# Patient Record
Sex: MALE | Race: BLACK OR AFRICAN AMERICAN | Employment: OTHER | URBAN - METROPOLITAN AREA
[De-identification: names, ages, dates, MRNs, and addresses within clinical notes are randomized per-mention and may not be internally consistent; named-entity substitution may affect disease eponyms.]

---

## 2017-01-04 ENCOUNTER — HOSPITAL ENCOUNTER (INPATIENT)
Age: 47
LOS: 6 days | Discharge: HOME | DRG: 885 | End: 2017-01-10
Attending: PSYCHIATRY & NEUROLOGY | Admitting: PSYCHIATRY & NEUROLOGY
Payer: MEDICARE

## 2017-01-04 PROBLEM — F25.9 SCHIZOAFFECTIVE DISORDER (HCC): Status: ACTIVE | Noted: 2017-01-04

## 2017-01-04 PROCEDURE — 74011250637 HC RX REV CODE- 250/637: Performed by: PSYCHIATRY & NEUROLOGY

## 2017-01-04 PROCEDURE — 65220000003 HC RM SEMIPRIVATE PSYCH

## 2017-01-04 RX ORDER — LORAZEPAM 2 MG/ML
1-2 INJECTION INTRAMUSCULAR
Status: DISCONTINUED | OUTPATIENT
Start: 2017-01-04 | End: 2017-01-10 | Stop reason: HOSPADM

## 2017-01-04 RX ORDER — HALOPERIDOL 5 MG/1
5 TABLET ORAL 2 TIMES DAILY
Status: DISCONTINUED | OUTPATIENT
Start: 2017-01-04 | End: 2017-01-07

## 2017-01-04 RX ORDER — ZOLPIDEM TARTRATE 5 MG/1
5 TABLET ORAL
Status: DISCONTINUED | OUTPATIENT
Start: 2017-01-04 | End: 2017-01-10 | Stop reason: HOSPADM

## 2017-01-04 RX ORDER — HALOPERIDOL 5 MG/ML
5 INJECTION INTRAMUSCULAR
Status: DISCONTINUED | OUTPATIENT
Start: 2017-01-04 | End: 2017-01-04

## 2017-01-04 RX ORDER — TRAZODONE HYDROCHLORIDE 100 MG/1
200 TABLET ORAL
Status: DISCONTINUED | OUTPATIENT
Start: 2017-01-04 | End: 2017-01-04

## 2017-01-04 RX ORDER — HYDROXYZINE PAMOATE 50 MG/1
50 CAPSULE ORAL
Status: DISCONTINUED | OUTPATIENT
Start: 2017-01-04 | End: 2017-01-10 | Stop reason: HOSPADM

## 2017-01-04 RX ORDER — OLANZAPINE 5 MG/1
5 TABLET, ORALLY DISINTEGRATING ORAL
Status: DISCONTINUED | OUTPATIENT
Start: 2017-01-04 | End: 2017-01-10 | Stop reason: HOSPADM

## 2017-01-04 RX ORDER — QUETIAPINE FUMARATE 300 MG/1
600 TABLET, FILM COATED ORAL
Status: DISCONTINUED | OUTPATIENT
Start: 2017-01-04 | End: 2017-01-10 | Stop reason: HOSPADM

## 2017-01-04 RX ORDER — HALOPERIDOL 5 MG/1
5 TABLET ORAL
Status: DISCONTINUED | OUTPATIENT
Start: 2017-01-04 | End: 2017-01-04

## 2017-01-04 RX ORDER — PANTOPRAZOLE SODIUM 40 MG/1
40 TABLET, DELAYED RELEASE ORAL DAILY
Status: DISCONTINUED | OUTPATIENT
Start: 2017-01-04 | End: 2017-01-05

## 2017-01-04 RX ORDER — HALOPERIDOL 10 MG/1
20 TABLET ORAL
Status: DISCONTINUED | OUTPATIENT
Start: 2017-01-04 | End: 2017-01-04

## 2017-01-04 RX ORDER — OXYCODONE AND ACETAMINOPHEN 5; 325 MG/1; MG/1
1 TABLET ORAL
Status: DISCONTINUED | OUTPATIENT
Start: 2017-01-04 | End: 2017-01-10 | Stop reason: HOSPADM

## 2017-01-04 RX ORDER — TOPIRAMATE 25 MG/1
50 TABLET ORAL
Status: DISCONTINUED | OUTPATIENT
Start: 2017-01-04 | End: 2017-01-10 | Stop reason: HOSPADM

## 2017-01-04 RX ORDER — SERTRALINE HYDROCHLORIDE 50 MG/1
200 TABLET, FILM COATED ORAL DAILY
Status: DISCONTINUED | OUTPATIENT
Start: 2017-01-04 | End: 2017-01-10 | Stop reason: HOSPADM

## 2017-01-04 RX ORDER — CALCIUM CARBONATE 200(500)MG
200 TABLET,CHEWABLE ORAL
Status: DISCONTINUED | OUTPATIENT
Start: 2017-01-04 | End: 2017-01-10 | Stop reason: HOSPADM

## 2017-01-04 RX ADMIN — SERTRALINE HYDROCHLORIDE 200 MG: 50 TABLET ORAL at 11:27

## 2017-01-04 RX ADMIN — PANTOPRAZOLE SODIUM 40 MG: 40 TABLET, DELAYED RELEASE ORAL at 10:00

## 2017-01-04 RX ADMIN — QUETIAPINE FUMARATE 600 MG: 300 TABLET, FILM COATED ORAL at 20:43

## 2017-01-04 RX ADMIN — TOPIRAMATE 50 MG: 25 TABLET, FILM COATED ORAL at 20:43

## 2017-01-04 RX ADMIN — CALCIUM CARBONATE (ANTACID) CHEW TAB 500 MG 200 MG: 500 CHEW TAB at 20:42

## 2017-01-04 RX ADMIN — HALOPERIDOL 5 MG: 5 TABLET ORAL at 20:43

## 2017-01-04 RX ADMIN — TOPIRAMATE 50 MG: 25 TABLET, FILM COATED ORAL at 11:27

## 2017-01-04 NOTE — H&P
[address] [facility] ADMIT NOTE-P    Name:  Linda Kitchen  MR#:  [id number]  :  1970  Account #:  [de-identified]  Date of Adm:  2017      IDENTIFYING INFORMATION: The patient is a 44-year-old   American male admitted from the [facility] Department. REASON FOR ADMISSION: The patient was admitted voluntarily for  worsening depression and psychosis. HISTORY OF PRESENT ILLNESS: The patient was seen during  rounds and he stated, \"because I hear voices and see things\" as the  reason for admission. Upon further questioning, the patient endorses  auditory and visual hallucinations \"for a long time. \" He also endorses a  depressed mood that has worsened for the last 2 weeks. The patient  reports having a cholecystectomy performed on 2016. He states  that approximately 1 week before the surgery, he had to stop his  psychotropic medications. He reports having restarted them after  surgery. However, he experienced a recurrence of his symptoms and  they have not improved since restarting his medications. The patient  endorses poor focus, sleep disturbance described as difficulty initiating  sleep, low appetite, poor energy and decreased functioning overall. The patient also endorsed suicidal ideation with thoughts to jump in  front of a car. He reports 7 previous suicide attempts. The patient also  reported relapse on crack cocaine that occurred yesterday after 4  months of sobriety. The patient reported using this substance since  approximately age 23 or 27. Since that time, his longest period of  sobriety has been 6 years. PSYCHIATRIC HISTORY: The patient has been hospitalized on 4  previous occasions. He states he was last hospitalized in November o  2016. He goes to [facility] in [place] for outpatient treatment. He reports previously taking  1. Zoloft 200 mg daily.   2. Seroquel 600 mg at bedtime. 3. Haldol 20 mg twice daily. 4. Trazodone 200 mg at bedtime. after his last hospital discharge. MEDICAL HISTORY: Significant for gastroesophageal reflux disease,  asthma, and migraine headaches. ALLERGIES: NO KNOWN DRUG ALLERGIES. FAMILY HISTORY: Significant for schizophrenia in the patient's  mother. SUBSTANCE ABUSE HISTORY: Per HPI. LEGAL HISTORY: The patient denies current pending legal charges. SOCIAL HISTORY: The patient currently lives in [place]. He is   from his wife. The patient receives disability benefits for  schizoaffective disorder. PHYSICAL EXAMINATION  Please refer to the physical exam performed by the nurse practitioner. VITAL SIGNS: The patient's most recent vitals are as follows:  Temperature 98.6, heart rate 98, blood pressure 122/83, respiratory  rate 18. LABORATORY VALUES: Significant labs are as follows: Urine drug  screen was positive for marijuana and cocaine. Low potassium of 2.8. MENTAL STATUS EXAMINATION: The patient is a 59-year-old male. He appears somewhat older than his stated age. The patient is  cooperative and able to participate in the interview. His eye contact  was fair. The patient's speech was normal in rate, volume and tone. There are no abnormal movements appreciated on exam. The patient's  stated mood is \"I'm okay. \" The patient's affect was constricted. The  patient's thoughts were logical and goal directed. The patient denied  suicidal and homicidal ideation at the time of the interview. The patient  did endorse auditory hallucinations at the time of the interview. He did  not appear to respond to internal stimuli. The patient stated that he  was able to tolerate his auditory hallucinations and focus on interview. His insight and judgment are impaired. ADMISSION DIAGNOSES  1. Schizoaffective disorder, depressed type. 2. Cocaine use disorder. 3. Rule out cannabis use disorder.     INITIAL TREATMENT PLAN: The patient will remain on the inpatient  unit. He will be continued on medications for his physical health  including Topamax 50 mg by mouth 3 times daily and Protonix 40 mg  daily will be supplemented for Nexium for the patient's GERD. The  patient will be continued on Seroquel 600 mg by mouth at bedtime. Haldol will be decreased to 5 mg by mouth twice daily for now. He is to  engage in all aspects of treatment in the therapeutic milieu. ESTIMATED LENGTH OF STAY: 4 to 5 days.         MD VANE Garrido / AMANDA  D:  01/04/2017   15:34  T:  01/04/2017   16:08  Job #:  [id number]

## 2017-01-04 NOTE — IP AVS SNAPSHOT
Matthew Cai 
 
 
 [address] [facility] [address] Patient: Caitlyn Zhong MRN: [id number] You are allergic to the following No active allergies Recent Documentation Height Weight BMI Smoking Status 1.829 m 103 kg 30.79 kg/m2 Current Every Day Smoker Emergency Contacts Name Discharge Info Relation Home Work Mobile U[employer] 18 CAREGIVER [3] Father [15] [phone] About your hospitalization You were admitted on:  2017 You last received care in the:  [facility] ADULT CHEM DEP You were discharged on:  January 10, 2017 Unit phone number:  [phone] Why you were hospitalized Your primary diagnosis was:  Not on File Your diagnoses also included:  Schizoaffective Disorder (Hcc) Providers Seen During Your Hospitalizations Provider Role Specialty Primary office phone Juan David York MD Attending Provider Psychiatry [phone] Your Primary Care Physician (PCP) Primary Care Physician Office Phone Office Fax Judy Maldonado [phone] Follow-up Information Follow up With Details Comments Contact Info [facility] On 2017 Patient has an appointment with Carmela Freedman at 6:00 pm.  [address] [place] [address] [address] [id number] [phone] Current Discharge Medication List  
  
CONTINUE these medications which have CHANGED Dose & Instructions Dispensing Information Comments Morning Noon Evening Bedtime  
 haloperidol 5 mg tablet Commonly known as:  HALDOL What changed:   
- how much to take - when to take this Your next dose is: Today, Tomorrow Other:  _________ Dose:  15 mg Take 3 Tabs by mouth nightly. Indications: PSYCHOTIC DISORDER Quantity:  90 Tab Refills:  0 CONTINUE these medications which have NOT CHANGED Dose & Instructions Dispensing Information Comments Morning Noon Evening Bedtime  
 b complex vitamins tablet Your next dose is: Today, Tomorrow Other:  _________ Dose:  1 Tab Take 1 Tab by mouth daily. Refills:  0  
     
   
   
   
  
 esomeprazole 40 mg capsule Commonly known as:  Lobito Feil Your next dose is: Today, Tomorrow Other:  _________ Dose:  40 mg Take 40 mg by mouth two (2) times a day. Refills:  0  
     
   
   
   
  
 linaclotide 290 mcg Cap capsule Commonly known as:  Samuel Fiordaliza Your next dose is: Today, Tomorrow Other:  _________ Dose:  290 mcg Take 290 mcg by mouth daily. Refills:  0  
     
   
   
   
  
 multivitamin tablet Commonly known as:  ONE A DAY Your next dose is: Today, Tomorrow Other:  _________ Dose:  1 Tab Take 1 Tab by mouth daily. Refills:  0  
     
   
   
   
  
 naproxen 500 mg tablet Commonly known as:  NAPROSYN Your next dose is: Today, Tomorrow Other:  _________ Dose:  500 mg Take 500 mg by mouth two (2) times daily (with meals). Indications: for 10 days Refills:  0 QUEtiapine 300 mg tablet Commonly known as:  SEROquel Your next dose is: Today, Tomorrow Other:  _________ Dose:  600 mg Take 2 Tabs by mouth nightly. Indications: Psychosis Quantity:  60 Tab Refills:  0  
     
   
   
   
  
 sertraline 100 mg tablet Commonly known as:  ZOLOFT Your next dose is: Today, Tomorrow Other:  _________ Dose:  200 mg Take 2 Tabs by mouth daily. Indications: Depression Quantity:  60 Tab Refills:  0  
     
   
   
   
  
 topiramate 50 mg tablet Commonly known as:  TOPAMAX Your next dose is: Today, Tomorrow Other:  _________  Dose:  50 mg  
 Take 50 mg by mouth three (3) times daily. Refills:  0 STOP taking these medications   
 oxyCODONE-acetaminophen 5-325 mg per tablet Commonly known as:  PERCOCET  
   
  
 penicillin v potassium 500 mg tablet Commonly known as:  VEETID  
   
  
 traZODone 100 mg tablet Commonly known as:  Aaron Fray Where to Get Your Medications Information on where to get these meds will be given to you by the nurse or doctor. ! Ask your nurse or doctor about these medications  
  haloperidol 5 mg tablet QUEtiapine 300 mg tablet  
 sertraline 100 mg tablet Discharge Instructions DISCHARGE SUMMARY from Nurse Discharge medications reviewed with the patient and appropriate educational materials and side effects teaching were provided. The personal items collected during your admission are returned to you: The discharge information has been reviewed with the patient. The patient verbalized understanding. Patient armband removed and shredded A copy of discharged instructions given to patient with medications to be filled in pharmacy of choice IMPORTANT NUMBERS TO REMEMBER Crisis Hotline: 4-920.321.4091. [facility]: [phone]. [facility] [facility] desk: [phone] Discharge Orders None Introducing [facility] SERVICES! Jesi Crawford introduces [employer] (now [employer] patient portal. Now you can access parts of your medical record, email your doctor's office, and request medication refills online. 1. In your internet browser, go to [email or web address] 2. Click on the First Time User? Click Here link in the Sign In box. You will see the New Member Sign Up page. 3. Enter your [employer] Access Code exactly as it appears below. You will not need to use this code after youve completed the sign-up process. If you do not sign up before the expiration date, you must request a new code. · [employer] Access Code: [email or web address] Expires: 3/23/2017  1:24 PM 
 
4. Enter the last four digits of your Social Security Number (xxxx) and Date of Birth (mm/dd/yyyy) as indicated and click Submit. You will be taken to the next sign-up page. 5. Create a [employer] ID. This will be your [employer] login ID and cannot be changed, so think of one that is secure and easy to remember. 6. Create a [employer] password. You can change your password at any time. 7. Enter your Password Reset Question and Answer. This can be used at a later time if you forget your password. 8. Enter your e-mail address. You will receive e-mail notification when new information is available in [address] 9. Click Sign Up. You can now view and download portions of your medical record. 10. Click the Download Summary menu link to download a portable copy of your medical information. If you have questions, please visit the Frequently Asked Questions section of the [employer] website. Remember, [employer] is NOT to be used for urgent needs. For medical emergencies, dial 911. Now available from your iPhone and Android! General Information Please provide this summary of care documentation to your next provider. Patient Signature:  ____________________________________________________________ Date:  ____________________________________________________________  
  
Vanda Meehan Provider Signature:  ____________________________________________________________ Date:  ____________________________________________________________

## 2017-01-04 NOTE — IP AVS SNAPSHOT
Current Discharge Medication List  
  
Take these medications at their scheduled times Dose & Instructions Dispensing Information Comments Morning Noon Evening Bedtime  
 b complex vitamins tablet Your next dose is: Today, Tomorrow Other:  ____________ Dose:  1 Tab Take 1 Tab by mouth daily. Refills:  0  
     
   
   
   
  
 esomeprazole 40 mg capsule Commonly known as:  Leslie Angel Your next dose is: Today, Tomorrow Other:  ____________ Dose:  40 mg Take 40 mg by mouth two (2) times a day. Refills:  0  
     
   
   
   
  
 haloperidol 5 mg tablet Commonly known as:  HALDOL Your next dose is: Today, Tomorrow Other:  ____________ Dose:  15 mg Take 3 Tabs by mouth nightly. Indications: PSYCHOTIC DISORDER Quantity:  90 Tab Refills:  0  
     
   
   
   
  
 linaclotide 290 mcg Cap capsule Commonly known as:  Malcolm Flock Your next dose is: Today, Tomorrow Other:  ____________ Dose:  290 mcg Take 290 mcg by mouth daily. Refills:  0  
     
   
   
   
  
 multivitamin tablet Commonly known as:  ONE A DAY Your next dose is: Today, Tomorrow Other:  ____________ Dose:  1 Tab Take 1 Tab by mouth daily. Refills:  0  
     
   
   
   
  
 naproxen 500 mg tablet Commonly known as:  NAPROSYN Your next dose is: Today, Tomorrow Other:  ____________ Dose:  500 mg Take 500 mg by mouth two (2) times daily (with meals). Indications: for 10 days Refills:  0 QUEtiapine 300 mg tablet Commonly known as:  SEROquel Your next dose is: Today, Tomorrow Other:  ____________ Dose:  600 mg Take 2 Tabs by mouth nightly. Indications: Psychosis Quantity:  60 Tab Refills:  0  
     
   
   
   
  
 sertraline 100 mg tablet Commonly known as:  ZOLOFT Your next dose is: Today, Tomorrow Other:  ____________ Dose:  200 mg Take 2 Tabs by mouth daily. Indications: Depression Quantity:  60 Tab Refills:  0  
     
   
   
   
  
 topiramate 50 mg tablet Commonly known as:  TOPAMAX Your next dose is: Today, Tomorrow Other:  ____________ Dose:  50 mg Take 50 mg by mouth three (3) times daily. Refills:  0 Where to Get Your Medications Information about where to get these medications is not yet available ! Ask your nurse or doctor about these medications  
  haloperidol 5 mg tablet QUEtiapine 300 mg tablet  
 sertraline 100 mg tablet

## 2017-01-04 NOTE — BH NOTES
Discussed case with the treating psychiatrist and unit charge nurse. EVERARDO Contact: Pt. Is a 42year old female with history of  Bipolar Disorder and polysubstance abuse (cocaine, marijuana and alcohol). EVERARDO met with pt. To discuss d/c planning. The pt. Stated he has been feeling depressed. Pt. admist to recently relapsing on cocaine and alcohol. Pt. Stated he had been clean for 4 months. Pt. admists that he smokes marijuana daily. The pt. Stated he has been hearing voices telling him to harm self. SW dicussed positive cooing skills, relapse prevention, safety plan and aftercare. Pt. Stated he will be living with family upon d/c.   Pt. Stated he will follow-up aftercare with [facility].

## 2017-01-04 NOTE — BSMART NOTE
ACTIVITIES THERAPY PROGRESS NOTE    Group time:1530  . The patient did not awaken/get up when called for group.

## 2017-01-04 NOTE — BH NOTES
Pt arrived ambulatory on unit escorted by patient representative. Pt came into unit and sat down and then laid his head down on the table. Pt was cooperative and polite during 1:1. Pt reported having SI without a plan. Pt reported having AVH and being very depressed about his health and strained relationship with his father. Pt reports relapsing on crack on 1/3/2017, using $400 of crack. Pt reports having asthma and a hx of migraine headaches. Pt gave a good medical, medication, and psychiatric history. Pt reported being discharged from [facility] in Dec. 2016. Pt reports a previous history of SI with two previous attempts one by taking pills and the other by drinking bleach. Pt reported being in Substance Abuse program  15 plus years ago. Pt has a recent gallbladder surgical history. Pt was compliant during 1:1 and upon finishing went to room to lay down.

## 2017-01-05 LAB
ATRIAL RATE: 88 BPM
CALCULATED P AXIS, ECG09: 61 DEGREES
CALCULATED R AXIS, ECG10: -37 DEGREES
CALCULATED T AXIS, ECG11: 48 DEGREES
DIAGNOSIS, 93000: NORMAL
P-R INTERVAL, ECG05: 162 MS
Q-T INTERVAL, ECG07: 386 MS
QRS DURATION, ECG06: 88 MS
QTC CALCULATION (BEZET), ECG08: 467 MS
VENTRICULAR RATE, ECG03: 88 BPM

## 2017-01-05 PROCEDURE — 74011250637 HC RX REV CODE- 250/637: Performed by: PSYCHIATRY & NEUROLOGY

## 2017-01-05 PROCEDURE — 93005 ELECTROCARDIOGRAM TRACING: CPT

## 2017-01-05 PROCEDURE — 65220000003 HC RM SEMIPRIVATE PSYCH

## 2017-01-05 RX ORDER — PANTOPRAZOLE SODIUM 40 MG/1
40 TABLET, DELAYED RELEASE ORAL 2 TIMES DAILY
Status: DISCONTINUED | OUTPATIENT
Start: 2017-01-05 | End: 2017-01-07 | Stop reason: SDUPTHER

## 2017-01-05 RX ADMIN — OXYCODONE HYDROCHLORIDE AND ACETAMINOPHEN 1 TABLET: 5; 325 TABLET ORAL at 10:54

## 2017-01-05 RX ADMIN — OXYCODONE HYDROCHLORIDE AND ACETAMINOPHEN 1 TABLET: 5; 325 TABLET ORAL at 20:41

## 2017-01-05 RX ADMIN — CALCIUM CARBONATE (ANTACID) CHEW TAB 500 MG 200 MG: 500 CHEW TAB at 10:54

## 2017-01-05 RX ADMIN — HALOPERIDOL 5 MG: 5 TABLET ORAL at 20:42

## 2017-01-05 RX ADMIN — TOPIRAMATE 50 MG: 25 TABLET, FILM COATED ORAL at 12:19

## 2017-01-05 RX ADMIN — PANTOPRAZOLE SODIUM 40 MG: 40 TABLET, DELAYED RELEASE ORAL at 20:41

## 2017-01-05 RX ADMIN — MULTIPLE VITAMINS W/ MINERALS TAB 1 TABLET: TAB at 08:14

## 2017-01-05 RX ADMIN — QUETIAPINE FUMARATE 600 MG: 300 TABLET, FILM COATED ORAL at 20:41

## 2017-01-05 RX ADMIN — PANTOPRAZOLE SODIUM 40 MG: 40 TABLET, DELAYED RELEASE ORAL at 08:14

## 2017-01-05 RX ADMIN — HALOPERIDOL 5 MG: 5 TABLET ORAL at 08:14

## 2017-01-05 RX ADMIN — TOPIRAMATE 50 MG: 25 TABLET, FILM COATED ORAL at 08:14

## 2017-01-05 RX ADMIN — TOPIRAMATE 50 MG: 25 TABLET, FILM COATED ORAL at 16:41

## 2017-01-05 RX ADMIN — SERTRALINE HYDROCHLORIDE 200 MG: 50 TABLET ORAL at 08:14

## 2017-01-05 NOTE — BH NOTES
Gogo Sagrario did not attend 30 minute nursing group on health and wellness. Pt was informed of group but remained in bed.

## 2017-01-05 NOTE — BH NOTES
Pt did attend and participate in group held this morning and was seen by doctor. He was seen by doctor and . Good appetite and spent some time on phone. He voiced no major complaints. Napped at times throughout shift. Wearing   Non skid socks. Encouraged to be more active in his treatment while here.

## 2017-01-05 NOTE — BH NOTES
MHT Note: The Pt was in his bed majority of the shift. The Pt complained about being tired and sleepy. He did eat dinner and bathed. The Pt stated he was just tired and wanted to get some rest right now. The Pt did contract for safety and did take his medication. The Pt did not have any visitors or phone calls during the shift. The Pt was reminded to keep socks and/or shoes on his feet to avoid slips and/or falls.

## 2017-01-05 NOTE — BH NOTES
Lolly Blair is participating in [employer]. Group time: 15 minutes    Personal goal for participation: Community Concerns    Goal orientation: community    Group therapy participation: active    Therapeutic interventions reviewed and discussed: Staff encouraged Pt. To report any repairs or Community Concerns    Impression of participation: Pt.  Had no repairs to report/community concerns at this time

## 2017-01-05 NOTE — BSMART NOTE
ART THERAPY GROUP PROGRESS NOTE    Group time:1430    The patient did not awaken/get up when called for group.

## 2017-01-05 NOTE — BH NOTES
Blakela Danny is not participating in Music Therapy. Group time: 15 minutes    Personal goal for participation:  Relax while listening to Aromatherapy music    Goal orientation: relaxation    Group therapy participation: refuse    Therapeutic interventions reviewed and discussed: Staff encouraged Pt. To participate in listening to soft music    Impression of participation:  Pt. Refuse chose to rest in bed despite staff encouragement.

## 2017-01-05 NOTE — PROGRESS NOTES
Problem: Suicide/Homicide (Adult/Pediatric)  Goal: *STG: Remains safe in hospital  Pt will not engage in any self injurious behaviors during hospitalization   Outcome: Progressing Towards Goal  Patient has remained free from harm in the unit, will continue to monitor patient for safety. Problem: Falls - Risk of  Goal: *Absence of falls  Pt will remain free of falls during hospitalization   Outcome: Progressing Towards Goal  Patient has not had any fall during the shift, will continue to monitor patient for safety. Comments:   Patient has been calm, pleasant and compliant with his medication therapy during the shift. Patient is participating in groups/therapies. Patient stated he still depressed and still hears voices, but says voices are not distinct. Patient denies HI but voiced SI. Patient contracted for safety and stated he will let the nurse know if he feels like he wants to hurt himself. Will continue to monitor patient for safety.

## 2017-01-05 NOTE — BH NOTES
The pt was discussed with nursing staff and seen during rounds. He c/o pain secondary to GERD and from his recent cholecystectomy. He reports tolerating meds without difficulty. He continues to experience AH and depressed mood. He did state he was able to participate in groups. The pt c/o poor sleep last pm.  He was encouraged to request pain medicine for his recent surgery. He was also encouraged to request Ambien for sleep. MSE-  39yo AAM.  Appears slightly older than stated age. Cooperative. Appears uncomfortable. Eye contact is fair. Speech is normal in rate, volume, and tone. Stated mood is, \"I'm okay. I'm just in pain. \"  Thoughts are goal-directed. Denies SI and HI. Endorses AH. Insight and judgment are mildly impaired. EKG reviewed. QT prolongation present but interval decreased from 11/2016 EKG. A/P-Schizoaffective disorder, depressed type; Cocaine use disorder; Rule out cannabis use disorder  1. Will increase Protonix to 40mg bid to target GERD. Pt is to contact his father to request he bring Nexium. 2. Continue Seroquel 600mg qhs.    3. Continue Haldol 5mg bid for now. 4. Continue Zoloft 200mg daily. 5. Continue hospitalization.

## 2017-01-05 NOTE — BH NOTES
GROUP THERAPY PROGRESS NOTE    Mathieu Muro is participating in [employer]. Group time: 30 minutes    Personal goal for participation: Pt sat quietly in group stating that he just wanted to get some sleeping. Wanted to ask  Doctor when he would be going home.

## 2017-01-05 NOTE — BH NOTES
Discussed case with the treating psychiatrist and unit charge nurse.     SW Contact: Pt. Is a 42year old female with history of Bipolar Disorder and polysubstance abuse (cocaine, marijuana and alcohol). SW met with pt. To discuss d/c planning. Pt. Stated he was feeling a little better. SW dicussed positive coping skills, relapse prevention and safety plan. Pt. Stated he is interested in a SA residential program.  SW assist the pt. with exploring residential SA program. Pt. affect is improving. Pt. continues have auditory hallucinations.

## 2017-01-05 NOTE — BSMART NOTE
OCCUPATIONAL THERAPY PROGRESS NOTE  Group Time:  2440  Attendance: The patient attended full group. Participation:  The patient participated with minimal elaboration in the activity. Attention:  The patient was able to focus on the activity. Interaction:  The patient acknowledges others or responds to questions,  with no spontaneous interaction. Discussed his relapse and feeling shame after 14 years of sobriety. Discussed possible triggers. Patient somewhat concrete.

## 2017-01-05 NOTE — BH NOTES
Cecilia Alfred is participating in Music Therapy. Group time: 15 minutes    Personal goal for participation:  Relax while listening to Aromatherapy music    Goal orientation: relaxation    Group therapy participation: active    Therapeutic interventions reviewed and discussed: Staff encouraged Pt. To participate in listening to soft music    Impression of participation:  Pt.  Was calm  And relax while listening to music

## 2017-01-05 NOTE — H&P
History and Physical        Patient: Otis Zhao               Sex: male          DOA: 1/4/2017         YOB: 1970      Age:  42 y.o.        LOS:  LOS: 1 day        HPI:     Otis Zhao is a 42 y.o. male who was admitted experiencing suicidal ideation, depression, auditory hallucinations and cocaine dependence. Active Problems:    Schizoaffective disorder ([address] (1/4/2017)        Past Medical History   Diagnosis Date    Adenomatous colon polyp     Anxiety     Arthritis     Asthma     Back pain     CPAP (continuous positive airway pressure) dependence     Depression     Dizziness     Gallbladder disease     GERD (gastroesophageal reflux disease)     Hemorrhoids     Joint pain     Migraine     Migraines     Neck pain     Pancreatitis     PUD (peptic ulcer disease)     Rectal bleeding     Sleep apnea      uses c pap       Past Surgical History   Procedure Laterality Date    Flexible sigmoidoscopy N/A 9/27/2016     SIGMOIDOSCOPY FLEXIBLE performed by Adela Coleman MD at [address] [place] [address]  10/25/2016          Hx gi       Gall Bladder       Family History   Problem Relation Age of Onset    Cancer Mother     Hypertension Father        Social History     Social History    Marital status: SINGLE     Spouse name: N/A    Number of children: 3    Years of education: High School graduate     Social History Main Topics    Smoking status: Current Every Day Smoker     Packs/day: 0.50     Years: 15.00    Smokeless tobacco: Not on file    Alcohol use 0.6 oz/week     1 Cans of beer per week    Drug use: Yes     Special: Cocaine      Comment: Crack    Sexual activity: Yes     Partners: Female     Birth control/ protection: None     Other Topics Concern    Not on file     Social History Narrative   Patient states he lives with his father. Reports appetite and sleep have been poor. He receives dis ability.     Prior to Admission medications    Medication Sig Start Date End Date Taking? Authorizing Provider   penicillin v potassium (VEETID) 500 mg tablet Take 1 Tab by mouth four (4) times daily for 7 days. 12/30/16 1/6/17 Yes ROGE Zee   naproxen (NAPROSYN) 500 mg tablet Take 500 mg by mouth two (2) times daily (with meals). Indications: for 10 days   Yes Historical Provider   oxyCODONE-acetaminophen (PERCOCET) 5-325 mg per tablet Take 1 Tab by mouth every four (4) hours as needed for Pain. Max Daily Amount: 6 Tabs. 12/30/16  Yes Jaycee Marrufo MD   sertraline (ZOLOFT) 100 mg tablet Take 200 mg by mouth daily. Yes Historical Provider   QUEtiapine (SEROQUEL) 300 mg tablet Take 600 mg by mouth nightly. Yes Historical Provider   esomeprazole (NEXIUM) 40 mg capsule Take 40 mg by mouth two (2) times a day. Yes Historical Provider   haloperidol (HALDOL) 5 mg tablet Take 20 mg by mouth two (2) times a day. Yes Historical Provider   multivitamin (ONE A DAY) tablet Take 1 Tab by mouth daily. Yes Historical Provider   linaclotide (LINZESS) 290 mcg cap capsule Take 290 mcg by mouth daily. Yes Historical Provider   topiramate (TOPAMAX) 50 mg tablet Take 50 mg by mouth three (3) times daily. Yes Historical Provider   traZODone (DESYREL) 100 mg tablet Take 200 mg by mouth nightly. Yes Historical Provider   b complex vitamins tablet Take 1 Tab by mouth daily. Historical Provider       No Known Allergies    Review of Systems  A comprehensive review of systems was negative. Physical Exam:      Visit Vitals    /83    Pulse 98    Temp 98.6 °F (37 °C)    Resp 18    Ht 6' (1.829 m)    Wt 227 lb (103 kg)    BMI 30.79 kg/m2       Physical Exam:    General:  Alert, cooperative, well developed, well nourished, unkempt, AA male, no distress, appears stated age. Eyes:  Conjunctivae/corneas clear. PERRL, EOMs intact. Fundi benign   Ears:  Normal TMs and external ear canals both ears. Nose: Nares normal. Septum midline.  Mucosa normal. No drainage or sinus tenderness. Mouth/Throat: Lips burned, mucosa, and tongue normal. Teeth in disrepair and gums normal.   Neck: Supple, symmetrical, trachea midline, no adenopathy, thyroid: no enlargement/tenderness/nodules, no carotid bruit and no JVD. Back:   Symmetric, no curvature. ROM normal. No CVA tenderness. Lungs:   Clear to auscultation bilaterally. Heart:  Regular rate and rhythm, S1, S2 normal, no murmur, click, rub or gallop. Abdomen:   Soft, tender to palpation, recent surgery. Five covered post surgery areas. Bowel sounds normal. No masses,  No organomegaly. Extremities: Extremities normal, atraumatic, no cyanosis or edema. Pulses: 2+ and symmetric all extremities. Skin: Skin color, texture, turgor normal.    Lymph nodes: Cervical, supraclavicular, and axillary nodes normal.   Neurologic: CNII-XII intact. Normal strength, sensation and reflexes throughout.            Assessment/Plan     Cocaine dependence  Auditory hallucinations  Suicidal ideation  Labs reviewed (+Cocaine, THC)  Continue treatment per physician's orders

## 2017-01-06 PROCEDURE — 65220000003 HC RM SEMIPRIVATE PSYCH

## 2017-01-06 PROCEDURE — 74011250637 HC RX REV CODE- 250/637: Performed by: PSYCHIATRY & NEUROLOGY

## 2017-01-06 RX ADMIN — MULTIPLE VITAMINS W/ MINERALS TAB 1 TABLET: TAB at 08:41

## 2017-01-06 RX ADMIN — HALOPERIDOL 5 MG: 5 TABLET ORAL at 20:30

## 2017-01-06 RX ADMIN — ZOLPIDEM TARTRATE 5 MG: 5 TABLET, FILM COATED ORAL at 22:49

## 2017-01-06 RX ADMIN — CALCIUM CARBONATE (ANTACID) CHEW TAB 500 MG 200 MG: 500 CHEW TAB at 15:37

## 2017-01-06 RX ADMIN — OXYCODONE HYDROCHLORIDE AND ACETAMINOPHEN 1 TABLET: 5; 325 TABLET ORAL at 20:30

## 2017-01-06 RX ADMIN — QUETIAPINE FUMARATE 600 MG: 300 TABLET, FILM COATED ORAL at 20:31

## 2017-01-06 RX ADMIN — SERTRALINE HYDROCHLORIDE 200 MG: 50 TABLET ORAL at 08:40

## 2017-01-06 RX ADMIN — PANTOPRAZOLE SODIUM 40 MG: 40 TABLET, DELAYED RELEASE ORAL at 20:30

## 2017-01-06 RX ADMIN — TOPIRAMATE 50 MG: 25 TABLET, FILM COATED ORAL at 08:41

## 2017-01-06 RX ADMIN — PANTOPRAZOLE SODIUM 40 MG: 40 TABLET, DELAYED RELEASE ORAL at 08:43

## 2017-01-06 RX ADMIN — TOPIRAMATE 50 MG: 25 TABLET, FILM COATED ORAL at 16:03

## 2017-01-06 RX ADMIN — HALOPERIDOL 5 MG: 5 TABLET ORAL at 09:00

## 2017-01-06 RX ADMIN — OXYCODONE HYDROCHLORIDE AND ACETAMINOPHEN 1 TABLET: 5; 325 TABLET ORAL at 08:43

## 2017-01-06 RX ADMIN — TOPIRAMATE 50 MG: 25 TABLET, FILM COATED ORAL at 13:24

## 2017-01-06 NOTE — BH NOTES
Discussed case with the treating psychiatrist and unit charge nurse.  Rachel MCGEE Contact: Pt. Is a 42year old female with history of Bipolar Disorder and polysubstance abuse (cocaine, marijuana and alcohol). EVERARDO met with pt. To discuss d/c planning. Pt. Was referred to [facility] [address] regarding rehab facilities. EVERARDO discussed positive coping skills, relapse prevention and safety plan. The pt. Will complete a phone assessment for a rehab program. Pt. continues to her voices,. Pt. is denying ideations . Pt. 's mood is starting to improve.

## 2017-01-06 NOTE — BH NOTES
GROUP THERAPY PROGRESS NOTE    Jenna Wyatt is participating in [address] Group    Group time: 1hour  Personal goal for participation:  Seek information on Alcohol      Goal orientation: active  Group therapy participation:   Fully participated    Therapeutic interventions reviewed and discussed: Staff encouraged Pt. To participate in Group    Impression of participation:  [address] members reviewed a film, then held an open discussion with Pt.  Pt. Yvan Cabrera and received feedback while in group

## 2017-01-06 NOTE — BSMART NOTE
OCCUPATIONAL THERAPY PROGRESS NOTE  Group Time:  5700  Attendance: The patient attended full group. Participation:  The patient participated with minimal elaboration in the activity. Attention:  The patient was able to focus on the activity. Interaction:  The patient acknowledges others or responds to questions,  with no spontaneous interaction. Participated as asked in discussion on stress management. Little spontaneous interaction.

## 2017-01-06 NOTE — BSMART NOTE
ART THERAPY GROUP PROGRESS NOTE    Group time:1430    The patient declined group despite encouragement. In bed.

## 2017-01-06 NOTE — PROGRESS NOTES
Problem: Suicide/Homicide (Adult/Pediatric)  Goal: *STG/LTG: Complies with medication therapy  Pt will comply with prescribed medications daily   Outcome: Progressing Towards Goal  Patient has been compliant with prescribed medication. Goal: *STG/LTG: No longer expresses self destructive or suicidal/homicidal thoughts  Pt will deny SI upon daily RN assessment   Outcome: Progressing Towards Goal  Patient has verbalized denial of suicidal and homicidal ideation and has contracted for safety. Comments:   Patient has been in room for majority of the shift however has been social and interactive with groups. Patient admits to having auditory hallucinations and having suicidal ideation off and on with no thoughts at present with patient mariam for safety. Will continue to monitor for safety with support as needed throughout treatment regimen.

## 2017-01-06 NOTE — BH NOTES
GROUP THERAPY PROGRESS NOTE    Amy Bauer did not participate in Reflection - Relaxation Group.      Group time: 30 minutes

## 2017-01-06 NOTE — BH NOTES
Group Note: The above pt participated in community group this shift he was receptive to the rules of the unit on this shift. He was undecided on a goal on this shift. He was educated on different coping skills that will help him focus on goal decision.

## 2017-01-07 PROCEDURE — 74011250637 HC RX REV CODE- 250/637: Performed by: PSYCHIATRY & NEUROLOGY

## 2017-01-07 PROCEDURE — 65220000003 HC RM SEMIPRIVATE PSYCH

## 2017-01-07 RX ORDER — HALOPERIDOL 10 MG/1
10 TABLET ORAL 2 TIMES DAILY
Status: DISCONTINUED | OUTPATIENT
Start: 2017-01-08 | End: 2017-01-09

## 2017-01-07 RX ORDER — ESOMEPRAZOLE MAGNESIUM 40 MG/1
40 CAPSULE, DELAYED RELEASE ORAL
Status: DISCONTINUED | OUTPATIENT
Start: 2017-01-07 | End: 2017-01-10 | Stop reason: HOSPADM

## 2017-01-07 RX ADMIN — PANTOPRAZOLE SODIUM 40 MG: 40 TABLET, DELAYED RELEASE ORAL at 09:28

## 2017-01-07 RX ADMIN — QUETIAPINE FUMARATE 600 MG: 300 TABLET, FILM COATED ORAL at 20:23

## 2017-01-07 RX ADMIN — MULTIPLE VITAMINS W/ MINERALS TAB 1 TABLET: TAB at 09:28

## 2017-01-07 RX ADMIN — OXYCODONE HYDROCHLORIDE AND ACETAMINOPHEN 1 TABLET: 5; 325 TABLET ORAL at 12:47

## 2017-01-07 RX ADMIN — TOPIRAMATE 50 MG: 25 TABLET, FILM COATED ORAL at 12:45

## 2017-01-07 RX ADMIN — HALOPERIDOL 5 MG: 5 TABLET ORAL at 20:23

## 2017-01-07 RX ADMIN — HALOPERIDOL 5 MG: 5 TABLET ORAL at 09:28

## 2017-01-07 RX ADMIN — TOPIRAMATE 50 MG: 25 TABLET, FILM COATED ORAL at 17:12

## 2017-01-07 RX ADMIN — TOPIRAMATE 50 MG: 25 TABLET, FILM COATED ORAL at 09:28

## 2017-01-07 NOTE — BH NOTES
Arnaud Todd is participating in [employer]. Group time: 15 minutes    Personal goal for participation: Community Concerns    Goal orientation: community    Group therapy participation: active    Therapeutic interventions reviewed and discussed: Staff encouraged Pt. To report any repairs or Community Concerns    Impression of participation: Pt.  Had no repairs to report/community concerns at this time

## 2017-01-07 NOTE — BH NOTES
GROUP THERAPY PROGRESS NOTE    Conor Conrad is not participating in [place].      Group time: 30 minutes    Personal goal for participation: none    Goal orientation: community    Group therapy participation: refused    Therapeutic interventions reviewed and discussed: goals and procedures    Impression of participation: encouraged

## 2017-01-07 NOTE — BH NOTES
Pt wasn't a behavioral problem nor disruptive this shift; The pt did eat all meals, participated in group and was compliant with med's. The pt did follow the rules and safety guidelines of the unit and will continue to be monitored for further assessment.

## 2017-01-07 NOTE — BH NOTES
GROUP THERAPY PROGRESS NOTE    Otis Zhao is participating in Topic Group. Group time: 45 minutes    Group therapy participation: active    Therapeutic interventions reviewed and discussed:   Patients read positive quotes and discussed them.

## 2017-01-07 NOTE — BH NOTES
GROUP THERAPY PROGRESS NOTE    Marcelo Gregorio is participating in Recreational Therapy. Group time: 1.5 hour    Goal orientation: relaxation    Group therapy participation: active    Therapeutic interventions reviewed and discussed:    Patients watched Lalit Gump and ate popcorn.

## 2017-01-08 PROCEDURE — 65220000003 HC RM SEMIPRIVATE PSYCH

## 2017-01-08 PROCEDURE — 74011250637 HC RX REV CODE- 250/637: Performed by: PSYCHIATRY & NEUROLOGY

## 2017-01-08 RX ADMIN — OXYCODONE HYDROCHLORIDE AND ACETAMINOPHEN 1 TABLET: 5; 325 TABLET ORAL at 12:21

## 2017-01-08 RX ADMIN — ESOMEPRAZOLE MAGNESIUM 40 MG: 40 CAPSULE, DELAYED RELEASE ORAL at 06:37

## 2017-01-08 RX ADMIN — OXYCODONE HYDROCHLORIDE AND ACETAMINOPHEN 1 TABLET: 5; 325 TABLET ORAL at 21:57

## 2017-01-08 RX ADMIN — ESOMEPRAZOLE MAGNESIUM 40 MG: 40 CAPSULE, DELAYED RELEASE ORAL at 15:48

## 2017-01-08 RX ADMIN — TOPIRAMATE 50 MG: 25 TABLET, FILM COATED ORAL at 08:09

## 2017-01-08 RX ADMIN — MULTIPLE VITAMINS W/ MINERALS TAB 1 TABLET: TAB at 08:09

## 2017-01-08 RX ADMIN — SERTRALINE HYDROCHLORIDE 200 MG: 50 TABLET ORAL at 08:09

## 2017-01-08 RX ADMIN — HALOPERIDOL 10 MG: 10 TABLET ORAL at 08:09

## 2017-01-08 RX ADMIN — TOPIRAMATE 50 MG: 25 TABLET, FILM COATED ORAL at 11:05

## 2017-01-08 RX ADMIN — TOPIRAMATE 50 MG: 25 TABLET, FILM COATED ORAL at 17:08

## 2017-01-08 RX ADMIN — HALOPERIDOL 10 MG: 10 TABLET ORAL at 21:13

## 2017-01-08 RX ADMIN — QUETIAPINE FUMARATE 600 MG: 300 TABLET, FILM COATED ORAL at 21:13

## 2017-01-08 RX ADMIN — OXYCODONE HYDROCHLORIDE AND ACETAMINOPHEN 1 TABLET: 5; 325 TABLET ORAL at 01:26

## 2017-01-08 NOTE — BH NOTES
Patient was able to be monitored on this shift. No issues with any negative or out of control behaviors. Was encouraged to be more participative on the unit. Did not attend any groups or activities. Only socialize upon approach with peers and the staff. Mood was calm and compliant the rules. Able to have his vitals checked before receiving his scheduled medicines. Ate both his breakfast and lunch. Mostly was in his room throughout the shift. Staff will continue to monitor for safety and locations.

## 2017-01-08 NOTE — BH NOTES
Lolly Blair is not participating in Recreational Therapy. Group time: 1 hour    Personal goal for participation: fresh air break    Goal orientation: social    Group therapy participation: refuse    Therapeutic interventions reviewed and discussed: Staff encouraged Pt.  To participate in group    Impression of participation: Pt refuse, chose to rest in bed despite staff encouragement

## 2017-01-08 NOTE — BH NOTES
Late entry note for 1/6/17 due to MD illness    The pt was discussed with nursing staff and seen during rounds. He c/o headaches and GERD. He states his father will bring his home med of Nexium for GERD. He reports sleeping fairly. He continues to endorse AH and SI. He denies plans or intentions of acting on his SI.       MSE-  39yo AAM. Appears slightly older than stated age. Cooperative. Eye contact is fair. Speech is normal in rate, volume, and tone. Mood is reported to be down. Thoughts are goal-directed. Endorses SI. Denies HI. Endorses AH. Insight and judgment are mildly impaired.      A/P-Schizoaffective disorder, depressed type; Cocaine use disorder; Rule out cannabis use disorder  1. The pt's father is to bring Nexium. The pt is to be provided this medication once sent to pharmacy if brought by his father. 2. Continue Seroquel 600mg qhs.   3. Increase Haldol to 10mg bid to target AH. 4. Continue Zoloft 200mg daily. 5. Continue hospitalization.

## 2017-01-08 NOTE — BH NOTES
Otis Zhao is not participating in Music Therapy. Group time: 30 minutes    Personal goal for participation:  Relax while listening to Aromatherapy music    Goal orientation: relaxation    Group therapy participation: refuse    Therapeutic interventions reviewed and discussed: Staff encouraged Pt. To participate in listening to soft music    Impression of participation:  Pt. Refuse chose to rest in bed despite staff encouragement.

## 2017-01-08 NOTE — PROGRESS NOTES
Problem: Suicide/Homicide (Adult/Pediatric)  Goal: *STG: Remains safe in hospital  Pt will not engage in any self injurious behaviors during hospitalization   Outcome: Progressing Towards Goal  Patient remains safe. Denied wanting to harm self today. Goal: *STG/LTG: Complies with medication therapy  Pt will comply with prescribed medications daily   Outcome: Progressing Towards Goal  Patient remains compliant and educated. Problem: Falls - Risk of  Goal: *Absence of falls  Pt will remain free of falls during hospitalization   Outcome: Progressing Towards Goal  Patient remained free of falls. Comments:   Assumed care of patient in shower. Patient stated that his day was okay. Denied wanting to harm self/others at present. Denied A/V/H at present. Will continue to offer 1:1 nursing support as needed. Patient contracted for safety.

## 2017-01-08 NOTE — BH NOTES
Izabela Reinakrystal is participating in [place]. Group time: 30 minutes    Personal goal for participation: unit rules and guidelines    Goal orientation: community    Group therapy participation: active    Therapeutic interventions reviewed and discussed:  Daily Treatment Goals. Impression of participation: \"I have problems sleeping because I hear voices and hallucinations. My mood is mixed . My medications are working a little. My goal today is to  Try to overcome voices and hallucinations. My family issue I need to work on at home is  none.

## 2017-01-08 NOTE — PROGRESS NOTES
Problem: Suicide/Homicide (Adult/Pediatric)  Goal: *STG: Remains safe in hospital  Pt will not engage in any self injurious behaviors during hospitalization   Outcome: Progressing Towards Goal  Patient contracted for safety  Goal: *STG: Attends activities and groups  Pt will attend three scheduled groups daily   Outcome: Progressing Towards Goal  Patient participated in groups this shift  Goal: *STG/LTG: Complies with medication therapy  Pt will comply with prescribed medications daily   Outcome: Progressing Towards Goal  Compliant with medications as prescribed    Problem: Psychosis  Goal: *STG: Decreased hallucinations  Pt will have a decrease in auditory hallucinations aeb logical and coherent speech upon daily RN assessment   Outcome: Progressing Towards Goal  Denies auditory or visual hallucinations this shift  Goal: *STG: Participates in individual and group therapy  Pt will participate in 1:1 RN assessment and group therapy daily   Outcome: Progressing Towards Goal  Attended and participated in groups    Comments:   Patient cooperative, pleasant as he interacts with staff and minimally with peers. Patient denies auditory or visual hallucinations denies thoughts to harm self or others. Attended groups, ate 100% dinner, received his medications as prescribed,Utilize non skid footwear for safety.  Will continue to monitor

## 2017-01-09 PROCEDURE — 65220000003 HC RM SEMIPRIVATE PSYCH

## 2017-01-09 PROCEDURE — 74011250637 HC RX REV CODE- 250/637: Performed by: PSYCHIATRY & NEUROLOGY

## 2017-01-09 RX ADMIN — TOPIRAMATE 50 MG: 25 TABLET, FILM COATED ORAL at 16:04

## 2017-01-09 RX ADMIN — HYDROXYZINE PAMOATE 50 MG: 50 CAPSULE ORAL at 16:04

## 2017-01-09 RX ADMIN — TOPIRAMATE 50 MG: 25 TABLET, FILM COATED ORAL at 13:46

## 2017-01-09 RX ADMIN — ESOMEPRAZOLE MAGNESIUM 40 MG: 40 CAPSULE, DELAYED RELEASE ORAL at 06:37

## 2017-01-09 RX ADMIN — ESOMEPRAZOLE MAGNESIUM 40 MG: 40 CAPSULE, DELAYED RELEASE ORAL at 16:06

## 2017-01-09 RX ADMIN — OXYCODONE HYDROCHLORIDE AND ACETAMINOPHEN 1 TABLET: 5; 325 TABLET ORAL at 08:32

## 2017-01-09 RX ADMIN — HALOPERIDOL 15 MG: 10 TABLET ORAL at 20:17

## 2017-01-09 RX ADMIN — QUETIAPINE FUMARATE 600 MG: 300 TABLET, FILM COATED ORAL at 20:17

## 2017-01-09 RX ADMIN — TOPIRAMATE 50 MG: 25 TABLET, FILM COATED ORAL at 08:28

## 2017-01-09 RX ADMIN — HALOPERIDOL 10 MG: 10 TABLET ORAL at 08:28

## 2017-01-09 RX ADMIN — MULTIPLE VITAMINS W/ MINERALS TAB 1 TABLET: TAB at 08:29

## 2017-01-09 RX ADMIN — SERTRALINE HYDROCHLORIDE 200 MG: 50 TABLET ORAL at 08:28

## 2017-01-09 NOTE — BH NOTES
While making rounds pt stated to this writer that he \"is depressed. \" and \"feeling suicidal.\" Pt also stated that he, \"contracts for safety and will remain safe,\" during his hospitalization. Will continue to monitor pt for safety and increase thoughts to harm self.

## 2017-01-09 NOTE — BH NOTES
GROUP THERAPY PROGRESS NOTE    Lorene Corrales is participating in [place] group. Group time: 15 minutes    Personal goal for participation: things to be thankful for    Goal orientation: personal    Group therapy participation: active    Therapeutic interventions reviewed and discussed: Things Im thankful for    Impression of participation:   For treatment

## 2017-01-09 NOTE — BH NOTES
Caitlyn Zhong is participating in [employer]. Group time: 15 minutes    Personal goal for participation: Community Concerns    Goal orientation: community    Group therapy participation: active    Therapeutic interventions reviewed and discussed: Staff encouraged Pt. To report any repairs or Community Concerns    Impression of participation: Pt.  Had no repairs to report/community concerns at this time

## 2017-01-09 NOTE — BH NOTES
Late Entry for 1/9/17    EVERARDO discussed case with the treating psychiatrist    EVERARDO Contact: Pt. Is a 42year old female with history of Bipolar Disorder and polysubstance abuse (cocaine, marijuana and alcohol). EVERARDO met with pt. To discuss d/c planning. Pt. Stated he was hearing voices and having ideations to harm self. SW discussed safety plan, relapse prevention and positive coping skills. EVERARDO referred pt. To [facility] for SA residential treatment. Pt. Was declined due to insurance reasons. EVERARDO will refer pt.  To [facility] residential program.

## 2017-01-09 NOTE — BSMART NOTE
OCCUPATIONAL THERAPY PROGRESS NOTE  Group Time:  3654  Attendance: The patient attended 1/2 of group. Participation:  The patient said he was unable to participate in the activity. Attention:  The patient was unable to attend to the activity. Interaction:. The patient acknowledges others or responds to questions,  with no spontaneous interaction. Said the \"voices\" were bad and he couldn't concentrate.

## 2017-01-09 NOTE — BH NOTES
GROUP THERAPY PROGRESS NOTE    Mary Jane Bonilla is participating in [place]. Group time: 45 minutes  Pt attended group regarding community announcements being read and discussed. Sat quietly listening.

## 2017-01-09 NOTE — PROGRESS NOTES
Problem: Suicide/Homicide (Adult/Pediatric)  Goal: *STG: Remains safe in hospital  Pt will not engage in any self injurious behaviors during hospitalization   Outcome: Progressing Towards Goal  Remains safe. Goal: *STG/LTG: Complies with medication therapy  Pt will comply with prescribed medications daily   Outcome: Progressing Towards Goal  Med compliant. Goal: *STG/LTG: No longer expresses self destructive or suicidal/homicidal thoughts  Pt will deny SI upon daily RN assessment   Outcome: Progressing Towards Goal  Still having suicidal thoughts. Comments:   Pt been mostly in his room, out for meals and he attended group. He was pleasant on approach. Stated endorsing none command voices \" just whispers \". Stated still feeling suicidal but have no plans. Med compliant. Calm/ cooperative behavior.

## 2017-01-09 NOTE — BH NOTES
GROUP THERAPY PROGRESS NOTE    Conor Hoskins is participating in Focus - Goals Group.      Group time: 30 minutes    Personal goal for participation: discuss daily Tx goal(s)                 Goal orientation: personal    Group therapy participation: active

## 2017-01-10 VITALS
DIASTOLIC BLOOD PRESSURE: 83 MMHG | WEIGHT: 227 LBS | TEMPERATURE: 97.6 F | HEIGHT: 72 IN | RESPIRATION RATE: 18 BRPM | SYSTOLIC BLOOD PRESSURE: 116 MMHG | BODY MASS INDEX: 30.75 KG/M2 | HEART RATE: 82 BPM

## 2017-01-10 PROCEDURE — 74011250637 HC RX REV CODE- 250/637: Performed by: PSYCHIATRY & NEUROLOGY

## 2017-01-10 RX ORDER — QUETIAPINE FUMARATE 300 MG/1
600 TABLET, FILM COATED ORAL
Qty: 60 TAB | Refills: 0 | Status: SHIPPED | OUTPATIENT
Start: 2017-01-10 | End: 2019-01-08

## 2017-01-10 RX ORDER — SERTRALINE HYDROCHLORIDE 100 MG/1
200 TABLET, FILM COATED ORAL DAILY
Qty: 60 TAB | Refills: 0 | Status: SHIPPED | OUTPATIENT
Start: 2017-01-10 | End: 2019-01-08

## 2017-01-10 RX ORDER — HALOPERIDOL 5 MG/1
15 TABLET ORAL
Qty: 90 TAB | Refills: 0 | Status: SHIPPED | OUTPATIENT
Start: 2017-01-10 | End: 2019-01-08

## 2017-01-10 RX ADMIN — TOPIRAMATE 50 MG: 25 TABLET, FILM COATED ORAL at 12:01

## 2017-01-10 RX ADMIN — TOPIRAMATE 50 MG: 25 TABLET, FILM COATED ORAL at 08:38

## 2017-01-10 RX ADMIN — OXYCODONE HYDROCHLORIDE AND ACETAMINOPHEN 1 TABLET: 5; 325 TABLET ORAL at 13:05

## 2017-01-10 RX ADMIN — ESOMEPRAZOLE MAGNESIUM 40 MG: 40 CAPSULE, DELAYED RELEASE ORAL at 16:12

## 2017-01-10 RX ADMIN — MULTIPLE VITAMINS W/ MINERALS TAB 1 TABLET: TAB at 08:38

## 2017-01-10 RX ADMIN — SERTRALINE HYDROCHLORIDE 200 MG: 50 TABLET ORAL at 08:38

## 2017-01-10 RX ADMIN — TOPIRAMATE 50 MG: 25 TABLET, FILM COATED ORAL at 16:27

## 2017-01-10 RX ADMIN — ESOMEPRAZOLE MAGNESIUM 40 MG: 40 CAPSULE, DELAYED RELEASE ORAL at 06:47

## 2017-01-10 NOTE — BH NOTES
Discussed case with the treating psychiatrist       EVERARDO Contact: Pt. Is a 42year old female with history of Bipolar Disorder and polysubstance abuse (cocaine, marijuana and alcohol). EVERARDO met with pt. To discuss d/c planning. Pt. Was feeling better the patient. denied ideations . But has some hallucinations. SW discussed relapse prevention, safety plan and positive coping skills. Pt. Was referred  To [facility] and [facility]. Pt. Was denied by [facility] due to insurance. Pt. Has to complete follow-up interview with [facility]. Pt. Plans to follow-up with [facility] residential program. Pt. Plans to return home with his father. Pt. Will follow-up with [facility] until he goes to [facility].   Pt. Father plans to pick pt.  Up.

## 2017-01-10 NOTE — BSMART NOTE
The personal items collected during your admission were returned to patient. The discharge information has been reviewed with the patient. The patient verbalized understanding. Patient armband removed and shredded. Patient alert and oriented at the time of discharged. Patient signed an authorization to release information. Patient was given a copy of the discharged the summary with follow-up instructions. Patient has prescriptions to be filled at pharmacy of choice. Patient denies thoughts of self-harm or harm to others at this time. Patient escorted out of the unit at 1843 by the [facility] [place] Jorge Alberto). patient has his own ride to pick him up.

## 2017-01-10 NOTE — BH NOTES
GROUP THERAPY PROGRESS NOTE    Steff Toribio is participating in [place]. Group time: 45 minutes  PT has been compliant with both community announcements  and goals group. His goal for today is    to participate in all groups.

## 2017-01-10 NOTE — BSMART NOTE
ART THERAPY GROUP PROGRESS NOTE    Group time:1430    The patient refused group despite encouragement.

## 2017-01-10 NOTE — DISCHARGE INSTRUCTIONS
DISCHARGE SUMMARY from Nurse    Discharge medications reviewed with the patient and appropriate educational materials and side effects teaching were provided. The personal items collected during your admission are returned to you:     The discharge information has been reviewed with the patient. The patient verbalized understanding. Patient armband removed and shredded    A copy of discharged instructions given to patient with medications to be filled in pharmacy of choice      [facility]: [phone]. [facility]: [phone].   [facility] desk: [phone]

## 2017-01-10 NOTE — BH NOTES
Pt. appears calm and cooperative in the milieu socializing with staff and peers. Pt. denies SI/HI. AV/H. Pt contracts for safety on the unit. Pt.denies any new medical/pain issues. Pt. completed ADL's. Pt. ate 100% of meals and has been compliant with medications. Pt.  participate in group. Pt. did not have any visitors. Staff encouraged Pt. to continue participating in treatment and  medication therapy. Pt agreed. Pt. remain free of falls and provided non skid socks. Staff will continue to monitor Pt. for behavior safety and location.

## 2017-01-10 NOTE — BH NOTES
The pt was discussed with nursing staff and seen during rounds. The pt states, \"I'm really having a hard time with the voices. \"  He reports hearing \"whispers\" and states he is unable to understand what his AH are saying. He states that this is a worsening of his sxs as he was previously able to understand his AH. When confronted about this, he states, \"They're just getting on my nerves. \"  He c/o sleep that is disturbed by his AH. He endorses SI. Denies plans.        MSE-  41yo AAM. Appears older than stated age. Cooperative. Eye contact is fair. Speech is normal in rate, volume, and tone. Mood is reported to be depressed. Thoughts are goal-directed. Endorses SI. Denies HI. Endorses AH. Insight and judgment are impaired.       A/P-Schizoaffective disorder, depressed type; Cocaine use disorder; Rule out cannabis use disorder  1. The pt's father is to bring Nexium. The pt is to be provided this medication once sent to pharmacy if brought by his father. 2. Continue Seroquel 600mg qhs.   3. Increase Haldol to 10mg daily and 15mg qhs to target AH. 4. Continue Zoloft 200mg daily. 5. Continue hospitalization.

## 2017-01-11 NOTE — BH NOTES
The pt was discussed with nursing staff and seen during rounds. The pt reports feeling better today and states, \"I feel like I'm doing a lot better compared to yesterday. \"  He denies SI and HI. He reports tolerating meds without difficulty. A safety plan was developed with the pt. He is agreeable to contacting his father, suicide hotline, and/or 911 if he should experience a recurrence of his SI.       MSE-  41yo AAM. Appears older than stated age. Cooperative. Eye contact is fair. Speech is normal in rate, volume, and tone. Mood is, \"I'm in a good mood right now. \"  Affect is mildly constricted. Thoughts are goal-directed. Denies SI and HI. Denies psychosis. Insight and judgment are fair.       A/P-Schizoaffective disorder, depressed type; Cocaine use disorder; Rule out cannabis use disorder  1. Continue Seroquel 600mg qhs.   2. Continue Haldol 10mg daily and 15mg qhs.   3. Continue Zoloft 200mg daily. 4. Discharge to home.

## 2017-01-23 NOTE — DISCHARGE SUMMARY
[address] [facility] DISCHARGE    Name:  David Thompson  MR#:  [id number]  :  1970  Account #:  [de-identified]  Date of Adm:  2017      DATE OF DISCHARGE:  1/10/2017        IDENTIFYING INFORMATION:  The patient is a 43-year-old   American male admitted from the [facility] Emergency Department. The patient was admitted voluntarily  for worsening depression and psychosis. HISTORY OF PRESENT ILLNESS:  The patient was seen during  rounds. He stated, \"because I hear voices and see things\" as the  reason for admission. Upon further questioning, the patient endorses  auditory and visual hallucinations \"for a long time\". He also endorses a  depressed mood that has worsened over the two weeks before  admission. The patient reported having had a cholecystectomy  performed on 2016. He stated that approximately one week  before the surgery he had to stop his psychotropic medications. He  reported having restarted them after surgery. However, he  experienced a recurrence of his symptoms, and they had not improved  since restarting his medications. The patient endorsed poor focus,  sleep disturbance described as difficulty initiating sleep, low appetite,  poor energy, and decreased functioning overall. The patient also  endorsed suicidal ideations, with thoughts to jump in front of a car. He  reported seven previous suicide attempts. The patient also reported a  recent relapse on crack cocaine that occurred the day before  admission. This relapse followed four months of sobriety. The patient  reported using this substance since approximately age 23 or 27. Since  that time, his longest period of sobriety has been six years. For further  details, please see the admission note dated 2017. HOSPITAL COURSE:  The patient was admitted to the inpatient unit  and oriented to the therapeutic milieu.   He was continued on  medications for his physical health. The patient was also continued on  Seroquel 600 mg by mouth at bedtime. The patient was prescribed  Haldol as an outpatient, in addition to Seroquel. Haldol was restarted,  but at a lower dose of 5 mg twice daily. The patient was also  continued on Zoloft 200 mg daily. The patient was also prescribed  trazodone as an outpatient. The patient's most recent EKG from  November 2016 revealed QT prolongation. The trazodone was  subsequently not continued. As stated, the patient was prescribed  Ambien as needed. The patient did experience sleep disturbance due  to pain from his surgery. The patient was prescribed narcotic pain  medication on an as needed basis for management of his  postoperative pain. The patient continued to endorse auditory  hallucinations and suicidal ideations without a plan. Haldol was  increased to 10 mg twice daily to target these auditory hallucinations. The patient continued to report difficulty with his auditory  hallucinations. Interestingly, the patient described what appeared to  be an improvement in his auditory hallucinations as he was no longer  able to understand and described them as whispers. Nevertheless, he  reported distress from his auditory hallucinations. Haldol was  increased again to 10 mg daily and 15 mg at bedtime. The patient  began to report feeling better overall. He began to deny suicidal and  homicidal ideation and reported an improvement in his auditory  hallucinations. He appeared appropriate for discharge. CONDITION ON DISCHARGE:  The patient was improved overall. He  was not actively psychotic, nor was he experiencing suicidal or  homicidal ideation. PROGNOSIS:  Guarded, especially considering his substance use and  difficult social situation. DISCHARGE DIAGNOSES  1. Schizoaffective disorder, depressed type. 2. Cocaine use disorder, severe. DISCHARGE MEDICATIONS  1.  Haldol 10 mg by mouth daily.  2. Haldol 15 mg by mouth at bedtime. 3. Seroquel 600 mg by mouth at bedtime. 4. Zoloft 200 mg by mouth daily. FOLLOWUP:  The patient is to follow up with [facility].         Josefa Christensen MD    TB / [address]  D:  01/23/2017   07:24  T:  01/23/2017   07:50  Job #:  [id number]

## 2019-01-04 PROBLEM — F14.10 COCAINE ABUSE (HCC): Status: ACTIVE | Noted: 2019-01-04

## 2019-01-04 PROBLEM — T50.901A OVERDOSE: Status: ACTIVE | Noted: 2019-01-04

## 2019-01-04 PROBLEM — R07.9 CHEST PAIN: Status: ACTIVE | Noted: 2019-01-04

## 2019-01-04 PROBLEM — R45.851 SUICIDAL IDEATION: Status: ACTIVE | Noted: 2019-01-04

## 2019-01-08 PROBLEM — R07.9 CHEST PAIN: Status: RESOLVED | Noted: 2019-01-04 | Resolved: 2019-01-08

## 2019-01-08 PROBLEM — T50.901A OVERDOSE: Status: RESOLVED | Noted: 2019-01-04 | Resolved: 2019-01-08

## 2019-10-05 ENCOUNTER — HOSPITAL ENCOUNTER (INPATIENT)
Age: 49
LOS: 15 days | Discharge: HOME | DRG: 885 | End: 2019-10-21
Attending: EMERGENCY MEDICINE | Admitting: PSYCHIATRY & NEUROLOGY
Payer: MEDICARE

## 2019-10-05 DIAGNOSIS — F20.9 SCHIZOPHRENIA, UNSPECIFIED TYPE (HCC): Primary | ICD-10-CM

## 2019-10-05 DIAGNOSIS — R45.851 SUICIDAL IDEATION: ICD-10-CM

## 2019-10-05 LAB
ALBUMIN SERPL-MCNC: 3.6 G/DL (ref 3.4–5)
ALBUMIN/GLOB SERPL: 1.1 {RATIO} (ref 0.8–1.7)
ALP SERPL-CCNC: 69 U/L (ref 45–117)
ALT SERPL-CCNC: 20 U/L (ref 16–61)
AMPHET UR QL SCN: NEGATIVE
ANION GAP SERPL CALC-SCNC: 4 MMOL/L (ref 3–18)
APAP SERPL-MCNC: <2 UG/ML (ref 10–30)
APPEARANCE UR: CLEAR
AST SERPL-CCNC: 18 U/L (ref 10–38)
BACTERIA URNS QL MICRO: NEGATIVE /HPF
BARBITURATES UR QL SCN: POSITIVE
BASOPHILS # BLD: 0 K/UL (ref 0–0.1)
BASOPHILS NFR BLD: 0 % (ref 0–2)
BENZODIAZ UR QL: NEGATIVE
BILIRUB SERPL-MCNC: 0.4 MG/DL (ref 0.2–1)
BILIRUB UR QL: NEGATIVE
BUN SERPL-MCNC: 14 MG/DL (ref 7–18)
BUN/CREAT SERPL: 11 (ref 12–20)
CALCIUM SERPL-MCNC: 8.7 MG/DL (ref 8.5–10.1)
CANNABINOIDS UR QL SCN: POSITIVE
CHLORIDE SERPL-SCNC: 116 MMOL/L (ref 100–111)
CO2 SERPL-SCNC: 24 MMOL/L (ref 21–32)
COCAINE UR QL SCN: POSITIVE
COLOR UR: ABNORMAL
CREAT SERPL-MCNC: 1.3 MG/DL (ref 0.6–1.3)
DIFFERENTIAL METHOD BLD: ABNORMAL
EOSINOPHIL # BLD: 0.2 K/UL (ref 0–0.4)
EOSINOPHIL NFR BLD: 6 % (ref 0–5)
EPITH CASTS URNS QL MICRO: NEGATIVE /LPF (ref 0–5)
ERYTHROCYTE [DISTWIDTH] IN BLOOD BY AUTOMATED COUNT: 19.2 % (ref 11.6–14.5)
ETHANOL SERPL-MCNC: <3 MG/DL (ref 0–3)
GLOBULIN SER CALC-MCNC: 3.2 G/DL (ref 2–4)
GLUCOSE SERPL-MCNC: 92 MG/DL (ref 74–99)
GLUCOSE UR STRIP.AUTO-MCNC: NEGATIVE MG/DL
HCT VFR BLD AUTO: 33.7 % (ref 36–48)
HDSCOM,HDSCOM: ABNORMAL
HGB BLD-MCNC: 10.7 G/DL (ref 13–16)
HGB UR QL STRIP: NEGATIVE
HYALINE CASTS URNS QL MICRO: ABNORMAL /LPF (ref 0–2)
KETONES UR QL STRIP.AUTO: ABNORMAL MG/DL
LEUKOCYTE ESTERASE UR QL STRIP.AUTO: NEGATIVE
LYMPHOCYTES # BLD: 1.6 K/UL (ref 0.9–3.6)
LYMPHOCYTES NFR BLD: 47 % (ref 21–52)
MCH RBC QN AUTO: 26.2 PG (ref 24–34)
MCHC RBC AUTO-ENTMCNC: 31.8 G/DL (ref 31–37)
MCV RBC AUTO: 82.4 FL (ref 74–97)
METHADONE UR QL: NEGATIVE
MONOCYTES # BLD: 0.4 K/UL (ref 0.05–1.2)
MONOCYTES NFR BLD: 12 % (ref 3–10)
MUCOUS THREADS URNS QL MICRO: ABNORMAL /LPF
NEUTS SEG # BLD: 1.2 K/UL (ref 1.8–8)
NEUTS SEG NFR BLD: 35 % (ref 40–73)
NITRITE UR QL STRIP.AUTO: NEGATIVE
OPIATES UR QL: NEGATIVE
PCP UR QL: NEGATIVE
PH UR STRIP: 6 [PH] (ref 5–8)
PLATELET # BLD AUTO: 287 K/UL (ref 135–420)
PMV BLD AUTO: 9.6 FL (ref 9.2–11.8)
POTASSIUM SERPL-SCNC: 3.5 MMOL/L (ref 3.5–5.5)
PROT SERPL-MCNC: 6.8 G/DL (ref 6.4–8.2)
PROT UR STRIP-MCNC: ABNORMAL MG/DL
RBC # BLD AUTO: 4.09 M/UL (ref 4.7–5.5)
RBC #/AREA URNS HPF: NEGATIVE /HPF (ref 0–5)
SALICYLATES SERPL-MCNC: 1.9 MG/DL (ref 2.8–20)
SODIUM SERPL-SCNC: 144 MMOL/L (ref 136–145)
SP GR UR REFRACTOMETRY: 1.02 (ref 1–1.03)
UROBILINOGEN UR QL STRIP.AUTO: 2 EU/DL (ref 0.2–1)
WBC # BLD AUTO: 3.4 K/UL (ref 4.6–13.2)
WBC URNS QL MICRO: ABNORMAL /HPF (ref 0–4)

## 2019-10-05 PROCEDURE — 85025 COMPLETE CBC W/AUTO DIFF WBC: CPT

## 2019-10-05 PROCEDURE — 93005 ELECTROCARDIOGRAM TRACING: CPT

## 2019-10-05 PROCEDURE — 99285 EMERGENCY DEPT VISIT HI MDM: CPT

## 2019-10-05 PROCEDURE — 80307 DRUG TEST PRSMV CHEM ANLYZR: CPT

## 2019-10-05 PROCEDURE — 81001 URINALYSIS AUTO W/SCOPE: CPT

## 2019-10-05 PROCEDURE — 74011250637 HC RX REV CODE- 250/637: Performed by: EMERGENCY MEDICINE

## 2019-10-05 PROCEDURE — 80053 COMPREHEN METABOLIC PANEL: CPT

## 2019-10-05 RX ORDER — ACETAMINOPHEN 325 MG/1
650 TABLET ORAL
Status: COMPLETED | OUTPATIENT
Start: 2019-10-05 | End: 2019-10-05

## 2019-10-05 RX ADMIN — ACETAMINOPHEN 650 MG: 325 TABLET ORAL at 23:58

## 2019-10-05 NOTE — ED TRIAGE NOTES
Patient arrived via triage complaining of  \"feeling light headed\". Patient states that he took 8-9 seroquel 400mg this morning around 11am. Patient also advised he vomited this morning after taking the medications. Patient advised he now has stomach pains and a migraine.  Patient does have a history of migraines

## 2019-10-06 PROBLEM — F20.9 SCHIZOPHRENIA (HCC): Status: ACTIVE | Noted: 2019-10-06

## 2019-10-06 LAB
ATRIAL RATE: 60 BPM
CALCULATED P AXIS, ECG09: 56 DEGREES
CALCULATED T AXIS, ECG11: 55 DEGREES
DIAGNOSIS, 93000: NORMAL
P-R INTERVAL, ECG05: 158 MS
Q-T INTERVAL, ECG07: 456 MS
QRS DURATION, ECG06: 88 MS
QTC CALCULATION (BEZET), ECG08: 456 MS
VENTRICULAR RATE, ECG03: 60 BPM

## 2019-10-06 PROCEDURE — 65220000003 HC RM SEMIPRIVATE PSYCH

## 2019-10-06 PROCEDURE — 74011250637 HC RX REV CODE- 250/637: Performed by: EMERGENCY MEDICINE

## 2019-10-06 PROCEDURE — 74011250637 HC RX REV CODE- 250/637: Performed by: PSYCHIATRY & NEUROLOGY

## 2019-10-06 RX ORDER — HYDROXYZINE PAMOATE 50 MG/1
50 CAPSULE ORAL
Status: DISCONTINUED | OUTPATIENT
Start: 2019-10-06 | End: 2019-10-21 | Stop reason: HOSPADM

## 2019-10-06 RX ORDER — TOPIRAMATE 25 MG/1
50 TABLET ORAL 2 TIMES DAILY WITH MEALS
Status: DISCONTINUED | OUTPATIENT
Start: 2019-10-07 | End: 2019-10-21 | Stop reason: HOSPADM

## 2019-10-06 RX ORDER — IBUPROFEN 400 MG/1
400 TABLET ORAL ONCE
Status: DISPENSED | OUTPATIENT
Start: 2019-10-06 | End: 2019-10-07

## 2019-10-06 RX ORDER — HALOPERIDOL 5 MG/ML
5 INJECTION INTRAMUSCULAR
Status: DISCONTINUED | OUTPATIENT
Start: 2019-10-06 | End: 2019-10-21 | Stop reason: HOSPADM

## 2019-10-06 RX ORDER — HALOPERIDOL 5 MG/1
5 TABLET ORAL
Status: DISCONTINUED | OUTPATIENT
Start: 2019-10-06 | End: 2019-10-21 | Stop reason: HOSPADM

## 2019-10-06 RX ORDER — KETOROLAC TROMETHAMINE 10 MG/1
10 TABLET, FILM COATED ORAL ONCE
Status: DISCONTINUED | OUTPATIENT
Start: 2019-10-06 | End: 2019-10-06 | Stop reason: CLARIF

## 2019-10-06 RX ORDER — ACETAMINOPHEN 325 MG/1
650 TABLET ORAL
Status: DISCONTINUED | OUTPATIENT
Start: 2019-10-06 | End: 2019-10-18

## 2019-10-06 RX ORDER — TOPIRAMATE 25 MG/1
50 TABLET ORAL 2 TIMES DAILY
Status: DISCONTINUED | OUTPATIENT
Start: 2019-10-06 | End: 2019-10-06 | Stop reason: SDUPTHER

## 2019-10-06 RX ORDER — PANTOPRAZOLE SODIUM 40 MG/1
40 TABLET, DELAYED RELEASE ORAL
Status: DISCONTINUED | OUTPATIENT
Start: 2019-10-07 | End: 2019-10-19

## 2019-10-06 RX ORDER — SUCRALFATE 1 G/1
1 TABLET ORAL 4 TIMES DAILY
COMMUNITY
End: 2019-10-21

## 2019-10-06 RX ORDER — TRAZODONE HYDROCHLORIDE 100 MG/1
100 TABLET ORAL
Status: DISCONTINUED | OUTPATIENT
Start: 2019-10-06 | End: 2019-10-11

## 2019-10-06 RX ADMIN — TRAZODONE HYDROCHLORIDE 100 MG: 100 TABLET ORAL at 20:45

## 2019-10-06 RX ADMIN — HALOPERIDOL 5 MG: 5 TABLET ORAL at 19:53

## 2019-10-06 RX ADMIN — ACETAMINOPHEN 650 MG: 325 TABLET ORAL at 19:53

## 2019-10-06 RX ADMIN — TOPIRAMATE 50 MG: 25 TABLET, FILM COATED ORAL at 18:00

## 2019-10-06 NOTE — BSMART NOTE
53 yo male pt interviewed in ED room 13 at the request of Dr Kranthi Glynn. Pt resting quietly upon my arrival. Reports coming to the ED because he was hearing voices telling him to harm himself, abdominal pain, migraine, and a suicide attempt via OD on seroquel. Pt states he is still hearing voices telling him to harm himself and is still feeling suicidal with a plan to OD. States he has recently \"relapsed on crack after being clean 6 months. My mother wanted me to give her some money and I did and she went and bought crack and I couldn't resist it and she knows that. \" pt reports not taking his meds for a week \"I am sure that's why I am hearing voices. \" he reports not feeling safe to be sent home \"if I go I know I will harm myself because my mother told me after we did the crack and the money was gone that I should just kill myself. And that has me messed up in the head. Also my brother killed himself in August and I haven't gotten over that. \" States his mother is a trigger for him. Reports marijuana use as well. Denies alcohol use. Last cocaine use reported as 10-3-19. Pt states he sees Dr Ivan Schreiber for outpatient care and that he usually goes to [facility] because his dr works there. States his last admission to [facility] was a \"couple of years ago\" indeed it was 1/2017. Pt states his preference is [facility] but will go anywhere \"if I absolutely have to. \" Pt denies h/i and v/h. States his father is his support but is concerned that his father will be upset with him \"he needed some money from me and I haven't seen him to give it to him as I have been here after I tried to kill myself. So he may not even talk to me right now. \" states his s/i started this morning and got worse throughout the day until he attempted to harm himself this afternoon by taking \"6\" of his seroquel tabs.  Reports medical history of migraines, anxiety, arthritis, depression, back and joint pain, and pancreatitis. No other issues voiced or noted at this time. Dr Otoniel Crews notified of assessment findings.

## 2019-10-06 NOTE — BSMART NOTE
Continues to need inpatient admission. Seen on ED rounds x 2, c/o command auditory hallucinations, suicidal ideation. Remains voluntary for treatment. DISPOSITION: Discussed with .  contacted and admission orders received. Report to [facility] on AMEE.

## 2019-10-06 NOTE — ED NOTES
Bedside, Verbal and Written shift change report given to [address] [facility] [address] (oncoming nurse) by Matias DAVIS(offgoing nurse). Report included the following information SBAR, Kardex, and MAR. Hourly rounding and  chart checks completed.

## 2019-10-06 NOTE — ED PROVIDER NOTES
EMERGENCY DEPARTMENT HISTORY AND PHYSICAL EXAM  This was created with voice recognition software and transcription errors may be present. 8:50 PM  Date: 10/5/2019  Patient Name: Georgia Sauceda    History of Presenting Illness     Chief Complaint:    History Provided By:     HPI: Georgia Sauceda is a 54 y.o. male with a past medical history of anxiety arthritis asthma back pain depression dizziness gallbladder disease reflux hemorrhoids joint pain migraine neck pain pancreatitis peptic ulcer disease rectal bleeding and schizophrenia who presents with generalized headache generalized abdominal pain and suicidal ideation. Patient states the headache and is been going on for about a day and is typical of his normal migraine associated with abdominal pain no nausea no vomiting and also that he took 6 Seroquel this morning and attempt to harm himself. Presently no other complaints. PCP: Markus Ward.  Katlyn Millan MD      Past History     Past Medical History:  Past Medical History:   Diagnosis Date    Adenomatous colon polyp     Anxiety     Arthritis     Asthma     Back pain     CPAP (continuous positive airway pressure) dependence     Depression     Dizziness     Gallbladder disease     GERD (gastroesophageal reflux disease)     Hemorrhoids     Joint pain     Migraine     Neck pain     Pancreatitis     PUD (peptic ulcer disease)     Rectal bleeding     Schizoaffective disorder (HCC)     Sleep apnea     uses c pap       Past Surgical History:  Past Surgical History:   Procedure Laterality Date    EGD  10/25/2016         FLEXIBLE SIGMOIDOSCOPY N/A 9/27/2016    SIGMOIDOSCOPY FLEXIBLE performed by Annel Freire MD at [address] N/A 6/19/2018    SIGMOIDOSCOPY FLEXIBLE performed by Dustin Owens MD at [facility] ENDOSCOPY    HX GI      Gall Bladder       Family History:  Family History   Problem Relation Age of Onset    Cancer Mother     Psychiatric Disorder Mother     Hypertension Father     Psychiatric Disorder Maternal Aunt        Social History:  Social History     Tobacco Use    Smoking status: Current Every Day Smoker     Packs/day: 0.25     Years: 15.00     Pack years: 3.75    Smokeless tobacco: Never Used   Substance Use Topics    Alcohol use: Not Currently    Drug use: Not Currently     Types: Cocaine, Marijuana     Comment: Crack- states relapsed last night after 6 months of being clean. Allergies:  No Known Allergies    Review of Systems     Review of Systems   All other systems reviewed and are negative. 10 point review of systems otherwise negative unless noted in HPI. Physical Exam       Physical Exam   Constitutional: He is oriented to person, place, and time. He appears well-developed. HENT:   Head: Normocephalic and atraumatic. Eyes: Pupils are equal, round, and reactive to light. EOM are normal.   Neck: Normal range of motion. Neck supple. Cardiovascular: Normal rate, regular rhythm and normal heart sounds. Exam reveals no friction rub. No murmur heard. Pulmonary/Chest: Effort normal and breath sounds normal. No respiratory distress. He has no wheezes. Abdominal: Soft. He exhibits no distension. There is no tenderness. There is no rebound and no guarding. Musculoskeletal: Normal range of motion. Neurological: He is alert and oriented to person, place, and time. Skin: Skin is warm and dry. Psychiatric: He has a normal mood and affect.  His behavior is normal. Thought content normal.       Diagnostic Study Results     Vital Signs   Visit Vitals  /85 (BP 1 Location: Left arm, BP Patient Position: At rest;Sitting)   Pulse 88   Temp 98.3 °F (36.8 °C)   Resp 20   SpO2 99%      EKG[de-identified] EKG shows sinus at 60 with a normal axis and normal intervals there is no ST elevation or depression and no hypertrophy  Imaging: None  CBC is unremarkable UA is unremarkable, chemistry is normal aspirin 1.9 Tylenol negative alcohol negative UDS positive for barbiturates and cocaine      Medical Decision Making     ED Course: Progress Notes, Reevaluation, and Consults:      Provider Notes (Medical Decision Making): This is a 51-year-old gentleman presents with suicidal ideation headache and abdominal pain. Headache is consistent with prior migraine. Abdominal pain is been going on for about the same time his abdominal exam is benign I do not think he needs any imaging we will check basic labs. He did take 6 Seroquel was discussed with poison control and likely clear for crisis evaluation. Patient with overdose on Seroquel I discussed with poison control but likely okay to be seen by crisis at this point. 11:18 PM d/w poison control; ok to have crisis eval.          Diagnosis     Clinical Impression: No diagnosis found. Disposition:    Patient's Medications   Start Taking    No medications on file   Continue Taking    BUTALBITAL-ACETAMINOPHEN-CAFF (FIORICET) -40 MG PER CAPSULE    Take 1 Cap by mouth every four (4) hours as needed for Pain. ESOMEPRAZOLE (NEXIUM) 40 MG CAPSULE    Take 1 Cap by mouth two (2) times a day. HALOPERIDOL (HALDOL) 10 MG TABLET    Take 1 Tab by mouth two (2) times a day. QUETIAPINE (SEROQUEL) 400 MG TABLET    Take 400 mg by mouth nightly. SERTRALINE (ZOLOFT) 100 MG TABLET    Take  by mouth two (2) times a day. SUMATRIPTAN (IMITREX) 100 MG TABLET    as needed. TOPIRAMATE (TOPAMAX) 50 MG TABLET    Take 1 Tab by mouth two (2) times a day. TRAMADOL (ULTRAM) 50 MG TABLET    as needed. TRAZODONE (DESYREL) 100 MG TABLET    Take 200 mg by mouth nightly.    These Medications have changed    No medications on file   Stop Taking    No medications on file

## 2019-10-06 NOTE — PROGRESS NOTES
Ibuprofen 400 mg was therapeutically interchanged for Ketorolac 10 mg per the P &T Committee approved Therapeutic Interchanges Policy.

## 2019-10-06 NOTE — ED NOTES
TRANSFER - OUT REPORT:    Verbal report given to Shaka Bee RN(name) on Conor Ramey  being transferred to [facility] (unit) for routine progression of care       Report consisted of patients Situation, Background, Assessment and   Recommendations(SBAR). Information from the following report(s) SBAR, ED Summary, Intake/Output and MAR was reviewed with the receiving nurse. Lines:       Opportunity for questions and clarification was provided.       Patient transported with:   [employer]

## 2019-10-06 NOTE — ED NOTES
Turned over to me by Dr. Filomena Cheatham, ED provider for follow-up and disposition. Patient is a 46-year-old suicidal ideation. Took 6 Seroquel is awaiting bed. Consult:  Discussed care with Omid Lorenzo, Crisis Services. Standard discussion; including history of patients chief complaint, available diagnostic results, and treatment course. Received the bed for admission. Patient transferred in stable condition.   6:23 PM

## 2019-10-07 PROBLEM — F25.1 SCHIZOAFFECTIVE DISORDER, DEPRESSIVE TYPE (HCC): Chronic | Status: ACTIVE | Noted: 2017-01-04

## 2019-10-07 PROBLEM — F14.20 COCAINE USE DISORDER, SEVERE, DEPENDENCE (HCC): Chronic | Status: ACTIVE | Noted: 2019-01-04

## 2019-10-07 PROCEDURE — 74011250637 HC RX REV CODE- 250/637: Performed by: PSYCHIATRY & NEUROLOGY

## 2019-10-07 PROCEDURE — 65220000003 HC RM SEMIPRIVATE PSYCH

## 2019-10-07 RX ORDER — QUETIAPINE FUMARATE 300 MG/1
600 TABLET, FILM COATED ORAL
Status: DISCONTINUED | OUTPATIENT
Start: 2019-10-07 | End: 2019-10-10

## 2019-10-07 RX ORDER — SERTRALINE HYDROCHLORIDE 50 MG/1
100 TABLET, FILM COATED ORAL 2 TIMES DAILY
Status: DISCONTINUED | OUTPATIENT
Start: 2019-10-07 | End: 2019-10-21 | Stop reason: HOSPADM

## 2019-10-07 RX ADMIN — TOPIRAMATE 50 MG: 25 TABLET, FILM COATED ORAL at 08:29

## 2019-10-07 RX ADMIN — HALOPERIDOL 5 MG: 5 TABLET ORAL at 19:16

## 2019-10-07 RX ADMIN — QUETIAPINE FUMARATE 600 MG: 300 TABLET ORAL at 20:41

## 2019-10-07 RX ADMIN — PANTOPRAZOLE SODIUM 40 MG: 40 TABLET, DELAYED RELEASE ORAL at 06:35

## 2019-10-07 RX ADMIN — PANTOPRAZOLE SODIUM 40 MG: 40 TABLET, DELAYED RELEASE ORAL at 16:27

## 2019-10-07 RX ADMIN — SERTRALINE HYDROCHLORIDE 100 MG: 50 TABLET ORAL at 20:41

## 2019-10-07 RX ADMIN — TOPIRAMATE 50 MG: 25 TABLET, FILM COATED ORAL at 16:27

## 2019-10-07 NOTE — H&P
[address] [facility] HISTORY AND PHYSICAL    Name:  Feliciano Aguayo  MR#:   [id number]  :  1970  ACCOUNT #:  [de-identified]  ADMIT DATE:  10/05/2019      IDENTIFYING DATA:  The patient presented as a 68-year-old single black male who lives with his father in [place], [place]. He says he is on disability. He is covered by [employer] and Medicaid. BASIS FOR ADMISSION:  The patient is admitted after presentation to emergency room saying that he had taken six Seroquel 400 mg pills in the morning time in an attempt to harm himself. He had said that he had been noncompliant with his prescribed Seroquel for 4-5 days. He was being followed by Dr. Toma Wiggins, psychiatrist in [place]. He said brother had killed himself in August with cocaine use and suicide. The patient had gone to visit mother 4-5 days ago, and the mother had convinced the patient to use his disability check to buy cocaine for both of them. When he ran out of money, apparently she got quite angry with him, that he would not buy more cocaine on credit and had began calling him a variety of foul names because he was bisexual.  She told him that he should just go kill himself. He was quite upset because he had used up his rent money that he was supposed to give to father to have a place to stay and he was not sure father would let him come back. He never lived on the streets before. He said he was depressed with suicidal ideas then and \"I can't believe mother did this to me. \"  He said that he had thought the mother had been clean before he went to see her, and when she had brought out cocaine, he had immediately relapsed with her. He said he began hearing voices saying that \"you are a crackhead, you should be dead. \"  This was his brother's voice taunting him. He had seen several visions of his dead brother.   He was supposed to be taking Zoloft 100 mg b.i.d., Seroquel 800 mg at bedtime, Haldol 10 mg twice a day, Nexium 40 mg twice a day and Topamax 50 mg three times a day for migraine headaches. He had been on trazodone up to 200 mg at bedtime. He said that he had called to Dr. 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 Room after taking about six Seroquel 400 mg pills. He said that he was thinking of dying, but did not really want to die. He could not contract not to try to kill himself. He does have about five prior admissions to [facility] as well as one to this facility many years ago. He was admitted to the [facility]. Substance Abuse 30-day program in [place], [place], at sometime in the distant past.  He had also gone to  and  several times, although he did not have a sponsor or home group. MEDICAL HISTORY:  Significant for gastroesophageal reflux disease and migraine headaches. ALLERGIES:  HE DENIED FOOD OR DRUG ALLERGIES. SUBSTANCE ABUSE HISTORY:  He had relapsed to cannabis use as well as cocaine. He denied history of DUI or drunk in public in the past.  He denied other legal problems due to substance abuse. He did not drink. He would smoke 3-4 cigarettes a day and he would drink about 5-6 jeanine a day. SOCIAL AND FAMILY HISTORY:  Family history of psychiatric illness included multiple family members with drug problems and mother with bipolar schizophrenia. He is a high school graduate and did not go to college. He never . He does have a girlfriend and three grown children who, he says, are supportive of him. PHYSICAL EXAMINATION:  VITAL SIGNS:  Most recent temperature 99 degrees, pulse 83, blood pressure 125/75, respirations 16, pO2 was 99%. CONSTITUTIONAL:  The patient was alert, in no acute distress, could walk without difficulty.   HEENT:  Pupils were equally round, reactive to light and accommodation. Extraocular muscles were intact. Tympanic membranes were clear, and hearing was intact. Oropharynx was clear. Cranial nerves were intact. NECK:  Supple, nontender, and he did have some posterior neck fullness, though no actual nodes were appreciated. Thyroid gland was normal.  Neck had full range of motion. CHEST:  Chest was clear to auscultation. BACK:  Spinous processes nontender. There was no flank tenderness. HEART:  Revealed regular rate and rhythm without murmur, gallop or rub. ABDOMEN:  Soft, nontender. Bowel sounds positive. There were no masses. EXTREMITIES:  Full range of motion. Normal sensory and motor. Deep tendon reflexes were intact. There were no lacerations or scars. Capillary refill was normal.    ASSESSMENT:  AXIS I:  Schizoaffective disorder, depressed type. Cocaine use disorder, severe. Cannabis use disorder, moderate. Nicotine use disorder, mild. AXIS II:  None. AXIS III:  Status post intentional quetiapine overdose, minor. Gastroesophageal reflux disease. Migraine headaches. TREATMENT PLAN:  This patient is admitted voluntarily after self-presentation to emergency room describing an intentional overdose of quetiapine. We will resume back on Zoloft 100 mg b.i.d., Seroquel 600 mg at night, Nexium, Topamax, trazodone 100 mg at bedtime. We will continue with individual, group and milieu therapies, art and recreation therapy, case management services, social work services. We will try to get information from Dr. Khoa Montiel office. ESTIMATED LENGTH OF STAY:  4-5 days. ANTICIPATED DISPOSITION:  Follow back with Dr. Rhea Reyes. PROGNOSIS:  Fair.       Joseph Macias MD      [id number]  D:  10/07/2019 14:52  T:  10/07/2019 17:42  JOB #:  [id number]

## 2019-10-07 NOTE — BSMART NOTE
Pt. Is a 54year old homeless male with history of  Schizoaffective disorder,  Cocaine use disorder and  Cannabis use disorder . Pt. Was admitted to this facility for having ideations to harm self along with auditory hallucinations. Pt.'s case was discussed with the psychiatrist , medical student and nurse. EVERARDO Contact:  SW attempted to meet with pt earlier this a.am.  Pt. Was asleep. SW will attempt to meet with pt at a later time. SW will assist the pt with dc planning . Blane Means

## 2019-10-07 NOTE — GROUP NOTE
[facility]AR[facility]  GROUP DOCUMENTATION INDIVIDUAL Group Therapy Note Date: October 6 Group Start Time: 5726 Group End Time: 8220 Group Topic: Nursing [facility] 1 SPECIAL TRTMT 1 Mitch Carmona Leader, RN 
 
[facility] GARCIA GROUP DOCUMENTATION GROUP Group Therapy Note Attendees: 7 Attendance: Did not attend Yennifer Britton RN

## 2019-10-07 NOTE — BH NOTES
Patient requesting medication for hallucinations. Patient given 5 mg of Haldol by mouth for psychosis.

## 2019-10-07 NOTE — PROGRESS NOTES
Patient has been isolative to room. He endorsed suicidal ideations however he agreed to seek staff if he gets the urge to act upon his ideations. He denied homicidal ideations. He endorsed auditory and visual hallucinations. He stated he came to the hospital because \"I was suicidal because I spent my rent money. \" He attend group and actively participated. He didn't have visitors or phone calls. He was medication compliant. Nursing will continue to provide safety and support.

## 2019-10-07 NOTE — BSMART NOTE
OCCUPATIONAL THERAPY PROGRESS NOTE Group Time:  1430 Attendance: The patient attended full group. Nino Nick The patient left and returned to activity at least once. Participation: The patient participated with minimal elaboration in the activity. . 
Attention: The patient needed frequent redirection to activity. Interaction: The patient acknowledges others or responds to questions,  with no spontaneous interaction. Minimal response to questions/activity. Jim Burger he was having trouble concentrating, appeared withdrawn and preoccupied.

## 2019-10-07 NOTE — GROUP NOTE
[facility] GROUP DOCUMENTATION INDIVIDUAL Group Therapy Note Date: October 7 Group Start Time: 0830 Group End Time: 1095 Group Topic: Nursing [facility] 1 SPECIAL TRT 1 Seema Cadet RN 
 
[facility] GROUP DOCUMENTATION GROUP Group Therapy Note Attendees: 15 Attendance: Attended Patient's Goal:  Staying Healthy Interventions/techniques: Informed Follows Directions: Followed directions Interactions: Interacted appropriately Mental Status: Calm Behavior/appearance: Cooperative Goals Achieved: Able to listen to others Additional Notes:   
Discussed maintaining good health during flu season. Reviewed flu vaccine information sheet.  
 
Mansi Lovelace RN

## 2019-10-07 NOTE — BH NOTES
The patient was monitored for safety. Mood was calm. Pt was mostly in his room lying down isolative to himself. PT seemed to be dealing with depression. Pt was able to eat all meals from the cafe. Pt was able to talk with his DR and SW. Staff will continue to monitor for safety and locations.

## 2019-10-08 PROCEDURE — 74011250637 HC RX REV CODE- 250/637: Performed by: PSYCHIATRY & NEUROLOGY

## 2019-10-08 PROCEDURE — 65220000003 HC RM SEMIPRIVATE PSYCH

## 2019-10-08 RX ADMIN — PANTOPRAZOLE SODIUM 40 MG: 40 TABLET, DELAYED RELEASE ORAL at 16:28

## 2019-10-08 RX ADMIN — SERTRALINE HYDROCHLORIDE 100 MG: 50 TABLET ORAL at 08:26

## 2019-10-08 RX ADMIN — QUETIAPINE FUMARATE 600 MG: 300 TABLET ORAL at 20:24

## 2019-10-08 RX ADMIN — ACETAMINOPHEN 650 MG: 325 TABLET ORAL at 18:25

## 2019-10-08 RX ADMIN — TOPIRAMATE 50 MG: 25 TABLET, FILM COATED ORAL at 16:28

## 2019-10-08 RX ADMIN — TRAZODONE HYDROCHLORIDE 100 MG: 100 TABLET ORAL at 22:00

## 2019-10-08 RX ADMIN — TOPIRAMATE 50 MG: 25 TABLET, FILM COATED ORAL at 08:26

## 2019-10-08 RX ADMIN — SERTRALINE HYDROCHLORIDE 100 MG: 50 TABLET ORAL at 20:24

## 2019-10-08 RX ADMIN — ACETAMINOPHEN 650 MG: 325 TABLET ORAL at 11:35

## 2019-10-08 NOTE — PROGRESS NOTES
Behavioral Health Progress Note    Admit Date: 10/5/2019  Hospital day 2    Vitals :   Patient Vitals for the past 8 hrs:   BP Temp Pulse Resp   10/08/19 0742 120/84 98 °F (36.7 °C) 86 16     Labs:  No results found for this or any previous visit (from the past 24 hour(s)).   Meds:   Current Facility-Administered Medications   Medication Dose Route Frequency    sertraline (ZOLOFT) tablet 100 mg  100 mg Oral BID    QUEtiapine (SEROquel) tablet 600 mg  600 mg Oral QHS    haloperidol lactate (HALDOL) injection 5 mg  5 mg IntraMUSCular Q6H PRN    haloperidol (HALDOL) tablet 5 mg  5 mg Oral Q6H PRN    traZODone (DESYREL) tablet 100 mg  100 mg Oral QHS PRN    acetaminophen (TYLENOL) tablet 650 mg  650 mg Oral Q6H PRN    pantoprazole (PROTONIX) tablet 40 mg  40 mg Oral ACB&D    hydrOXYzine pamoate (VISTARIL) capsule 50 mg  50 mg Oral Q4H PRN    topiramate (TOPAMAX) tablet 50 mg  50 mg Oral BID WITH MEALS    influenza vaccine 2019-20 (6 mos+)(PF) (FLUARIX/FLULAVAL/FLUZONE QUAD) injection 0.5 mL  0.5 mL IntraMUSCular PRIOR TO DISCHARGE      Hospital Problems: Principal Problem:    Schizoaffective disorder, depressive type ([address] (1/4/2017)    Active Problems:    Cocaine use disorder, severe, dependence (HCC) (1/4/2019)        Subjective:   Medication side effects: fatigue/weakness, myalgias  GI disturbance    Mental Status Exam  Sensorium: oriented to person, place and time/date  Orientation: only aware of  time, place and person  Relations: guarded  Eye Contact: appropriate  Appearance: shows no evidence of impairment  Thought Process: normal rate of thoughts and poor abstract reasoning/computation   Thought Content: delusions and paranoia   Suicidal: denies   Homicidal: none   Mood: is anxious and is irritable   Affect: stable  Memory: is impaired and is recent     Concentration: distractable  Abstraction: concrete  Insight: The patient shows little insight    OR Poor  Judgement: is psychologically impaired OR Poor    Assessment/Plan:   not changed  Talks of having stomach cancer though he has not actually gone to see anyone about this. Nurses report he will not approach others , only talk if addressed. Says he has aches and pains everywhere in his body. Says he hears the voice of his  brother. Slept well w/ meds. Believes that his case has been dropped by [facility] and will have to go back to intake. Will not have dealings w/ mother and afraid father will not talk to him. Has not called him.   Continue close observation, supportive care

## 2019-10-08 NOTE — GROUP NOTE
SARAHI  GROUP DOCUMENTATION INDIVIDUAL Group Therapy Note Date: October 7 Group Start Time: 2000 Group End Time: 2030 Group Topic: Nursing [facility] 1 SPECIAL TRTMT 1 Bhumika Napier RN 
 
[facility] GROUP DOCUMENTATION GROUP Group Therapy Note Attendees: 9 Attendance: Attended Interventions/techniques: Informed Follows Directions: Followed directions Interactions: Interacted appropriately Mental Status: Calm Behavior/appearance: Cooperative Goals Achieved: Able to engage in interactions, Able to listen to others and Able to self-disclose Vijay Howard RN

## 2019-10-08 NOTE — GROUP NOTE
IP  GROUP DOCUMENTATION INDIVIDUAL Group Therapy Note Date: October 8 Group Start Time: 0830 Group End Time: 4124 Group Topic: Nursing [facility] 1 SPECIAL TRTMT 1 SUSAN Ontiveros  GROUP DOCUMENTATION GROUP Group Therapy Note Attendees: 5 Attendance: Did not attend Tegan Youngblood RN

## 2019-10-08 NOTE — BH NOTES
Patient spent most of his day in bed sleeping, he got up ate, he participated in group and took his medicine and returned to bed , staff will continue to encourage and monitor patient for safety.

## 2019-10-08 NOTE — BH NOTES
The patient was monitored for safety. The patient only came out of his room to eat his meals and talk to his Dr. Yuliya Fitzgerald was encouraged to verbalize any issues. The patient was able to eat all meals. Refused all groups and activities. Staff will continue to monitor for safety and locations.

## 2019-10-08 NOTE — BSMART NOTE
Pt. Is a 54year old homeless male with history of  Schizoaffective disorder,  Cocaine use disorder and  Cannabis use disorder . Pt. Was admitted to this facility for having ideations to harm self along with auditory hallucinations.  
  
 
SW Contact:  SW met with pt to discuss dc planning. Pt. Was in bed in his room. \" I do not feel well today. \"  \" I feel bad and ache all over and I have a headache. \"  Pt. Expressed he feels bad physically and emotionally. Pt. Informed SW he was stable on medications for 6 months. The pt. States he was sober for 6 months and relapsed this past weekend on crack cocaine. Pt. States he used crack cocaine mother. Pt.reflected on how his mother treated him and things she said to him. Pt. States his brother killed self a couple of months ago and his mother was telling him to go and kill self. SW aloud pt to vent about his negative interacting with his mother. Pt reports to hearing voices telling him to kill self. Pt currently contracts for safety. SW discussed self-efficacy, positive coping skills /positive self talk and safety plan. SW discussed pt. Exploring rehab program.  Pt. Reports he went to [facility] rehab center a couple on months ago. Pt. States he was doing well until he went to his mother's home. Pt. Would like to explore aging to rehab. SW will refer pt to [facility]. Pty. Appears depressed, has fair insight and is cooperative. SW will assist the pt  with dc planning.

## 2019-10-08 NOTE — PROGRESS NOTES
Problem: Falls - Risk of  Goal: *Absence of Falls  Description  Document Cricket Galvan Fall Risk and appropriate interventions in the flowsheet.   Outcome: Progressing Towards Goal  Note:   Fall Risk Interventions:                                Problem: Suicide  Goal: *STG: Remains safe in hospital  Description  Pt will not engage in any self injurious behaviors while hospitalized    Outcome: Progressing Towards Goal     Problem: Suicide  Goal: *STG:  Verbalizes alternative ways of dealing with maladaptive feelings/behaviors  Description  Pt will identify 3 positive coping skills to utilize when feelings to harm self arise on daily RN assessment    Outcome: Progressing Towards Goal     Problem: Suicide  Goal: *STG/LTG: No longer expresses self destructive or suicidal thoughts  Description  Pt will deny SI on daily RN assessment    Outcome: Progressing Towards Goal     Problem: Opiate Withdrawal  Goal: *STG: Participates in treatment plan  Description  Pt will actively participate in treatment plan while hospitalized    Outcome: Progressing Towards Goal     Problem: Opiate Withdrawal  Goal: *STG: Seeks staff when symptoms of withdrawal increase  Description  Pt will notify staff at onset of withdrawal symptoms to ensure safe detox while hospitalized    Outcome: Progressing Towards Goal     Problem: Opiate Withdrawal  Goal: *STG: Attends activities and groups  Description  Pt will attend and participate in 3 scheduled groups daily while hospitalized    Outcome: Progressing Towards Goal     Problem: Altered Thought Process (Adult/Pediatric)  Goal: *STG: Decreased hallucinations  Description  Pt will deny auditory hallucinations on daily RN assessment    Outcome: Progressing Towards Goal     Problem: Altered Thought Process (Adult/Pediatric)  Goal: Interventions  Outcome: Progressing Towards Goal   Patient appeared angry but cooperative, speaks very soft, isolated self to his room for the shift however, he came out of room for dinner and medications. Patient denies auditory hallucinations but observed responding to internal stimuli. Patient was able to contract to safety, he was encouraged to complete his hygiene but declined at this time. Patient also encouraged to seek staff if any feelings of SI arise, he remained free of falls this shift.  Will continue to monitor

## 2019-10-08 NOTE — BSMART NOTE
OCCUPATIONAL THERAPY PROGRESS NOTE Group time:1430 Came for about 5-10 minutes and said he needed to to lay back down.

## 2019-10-09 PROCEDURE — 65220000003 HC RM SEMIPRIVATE PSYCH

## 2019-10-09 PROCEDURE — 74011250637 HC RX REV CODE- 250/637: Performed by: PSYCHIATRY & NEUROLOGY

## 2019-10-09 RX ORDER — QUETIAPINE FUMARATE 100 MG/1
100 TABLET, FILM COATED ORAL DAILY
Status: DISCONTINUED | OUTPATIENT
Start: 2019-10-10 | End: 2019-10-10

## 2019-10-09 RX ADMIN — ACETAMINOPHEN 650 MG: 325 TABLET ORAL at 08:11

## 2019-10-09 RX ADMIN — TRAZODONE HYDROCHLORIDE 100 MG: 100 TABLET ORAL at 20:08

## 2019-10-09 RX ADMIN — TOPIRAMATE 50 MG: 25 TABLET, FILM COATED ORAL at 08:09

## 2019-10-09 RX ADMIN — ACETAMINOPHEN 650 MG: 325 TABLET ORAL at 18:41

## 2019-10-09 RX ADMIN — PANTOPRAZOLE SODIUM 40 MG: 40 TABLET, DELAYED RELEASE ORAL at 16:44

## 2019-10-09 RX ADMIN — SERTRALINE HYDROCHLORIDE 100 MG: 50 TABLET ORAL at 20:08

## 2019-10-09 RX ADMIN — QUETIAPINE FUMARATE 600 MG: 300 TABLET ORAL at 20:08

## 2019-10-09 RX ADMIN — PANTOPRAZOLE SODIUM 40 MG: 40 TABLET, DELAYED RELEASE ORAL at 08:09

## 2019-10-09 RX ADMIN — TOPIRAMATE 50 MG: 25 TABLET, FILM COATED ORAL at 16:44

## 2019-10-09 RX ADMIN — SERTRALINE HYDROCHLORIDE 100 MG: 50 TABLET ORAL at 08:09

## 2019-10-09 NOTE — GROUP NOTE
SARAHI  GROUP DOCUMENTATION INDIVIDUAL Group Therapy Note Date: October 8 Group Start Time: 1 Group End Time: 2000 Group Topic: Nursing [address] 1 SPECIAL TRTMT 1 Meghana Lake, RN 
 
[facility]  GROUP DOCUMENTATION GROUP Group Therapy Note Attendees: 7 Attendance: Did not attend Additional Notes: Patient remained isolated to self in bed through group time Sathya Manning RN

## 2019-10-09 NOTE — BSMART NOTE
Pt. Is a 54year old homeless male with history of  Schizoaffective disorder,  Cocaine use disorder and  Cannabis use disorder . Pt. Was admitted to this facility for having ideations to harm self along with auditory hallucinations. Pt's case was discussed in treatment team this am. Pt. Was referred to [facility] SW Contact:  SW met with pt to discuss dc planning. \" I still hear voices telling me to kill self\". SW encouraged p to utilize positive self talk and dicussed continued safety plan. Pt ask SW to refer him to [facility] [place] oppose to the [facility]. .  Pt. Appears dishevel, depressed and cooperative. has fair insight and is cooperative. SW will assist the pt  with dc planning.

## 2019-10-09 NOTE — PROGRESS NOTES
Behavioral Health Progress Note    Admit Date: 10/5/2019  Hospital day 3    Vitals : No data found. Labs:  No results found for this or any previous visit (from the past 24 hour(s)).   Meds:   Current Facility-Administered Medications   Medication Dose Route Frequency    sertraline (ZOLOFT) tablet 100 mg  100 mg Oral BID    QUEtiapine (SEROquel) tablet 600 mg  600 mg Oral QHS    haloperidol lactate (HALDOL) injection 5 mg  5 mg IntraMUSCular Q6H PRN    haloperidol (HALDOL) tablet 5 mg  5 mg Oral Q6H PRN    traZODone (DESYREL) tablet 100 mg  100 mg Oral QHS PRN    acetaminophen (TYLENOL) tablet 650 mg  650 mg Oral Q6H PRN    pantoprazole (PROTONIX) tablet 40 mg  40 mg Oral ACB&D    hydrOXYzine pamoate (VISTARIL) capsule 50 mg  50 mg Oral Q4H PRN    topiramate (TOPAMAX) tablet 50 mg  50 mg Oral BID WITH MEALS    influenza vaccine 2019-20 (6 mos+)(PF) (FLUARIX/FLULAVAL/FLUZONE QUAD) injection 0.5 mL  0.5 mL IntraMUSCular PRIOR TO DISCHARGE      Hospital Problems: Principal Problem:    Schizoaffective disorder, depressive type ([facility] (1/4/2017)    Active Problems:    Cocaine use disorder, severe, dependence ([facility]) (1/4/2019)        Subjective:   Medication side effects: headache, anxiety  insomnia    Mental Status Exam  Sensorium: alert  Orientation: only aware of  time, place and person  Relations: cooperative and passive  Eye Contact: poor  Appearance: is unkempt  Thought Process: slow rate of thoughts and poor abstract reasoning/computation   Thought Content: delusions, paranoia and halluc   Suicidal: hears voice about it   Homicidal: none   Mood: is depressed and is anxious   Affect: odd, constricted  Memory: shows no evidence of impairment     Concentration: distractable  Abstraction: concrete  Insight: The patient shows little insight    OR Fair  Judgement: is psychologically impaired OR  Fair    Assessment/Plan:   not changed  Continues to hear auditory halluc voice of dead brother, saw vision of hime last night. Had migraine HA yesterd, less HA now. Poor sleep because of hearing voice in head. Wants to go to substance rehab after finishes halluc and stable on mends. No SI but says voice talks of him dying. Continue close observation, reality orientation. Medications: dosage change: increase Quetiapine to 100 mg qam and 600 mg qhs. Risks and benefits of medication were discussed. Potential medication side effects were discussed. Patient was given opportunity to ask questions.

## 2019-10-09 NOTE — BH NOTES
Patient arrived on the unit from the [facility] with thoughts of self harm and auditory and visual hallucinations. Patient is able to contract for safety while hospitalized but not outside the hospital. Patient is pleasant and cooperative on assessment. RNs will initiate, develop, implement, review or revise treatment plan.

## 2019-10-09 NOTE — BSMART NOTE
OCCUPATIONAL THERAPY PROGRESS NOTE Group Time:  8798 Attendance: The patient attended full group. Jose Cutler Participation: The patient participated with minimal elaboration in the activity. Attention: The patient was able to focus on the activity. Interaction: The patient acknowledges others or responds to questions,  with no spontaneous interaction. Appropriate in responses, states he feels better today and showed more affect, still appears depressed and withdrawn.

## 2019-10-09 NOTE — PROGRESS NOTES
Problem: Falls - Risk of  Goal: *Absence of Falls  Description  Document Nicanor Manjarrez Fall Risk and appropriate interventions in the flowsheet. Outcome: Progressing Towards Goal  Note:   Fall Risk Interventions:                                Problem: Suicide  Goal: *STG:  Verbalizes alternative ways of dealing with maladaptive feelings/behaviors  Description  Pt will identify 3 positive coping skills to utilize when feelings to harm self arise on daily RN assessment    Outcome: Progressing Towards Goal     Problem: Altered Thought Process (Adult/Pediatric)  Goal: *STG: Decreased hallucinations  Description  Pt will deny auditory hallucinations on daily RN assessment    Outcome: Progressing Towards Goal   Patient states he doing better. Continue to state he is depressed but denies suicidal thoughts. Denies auditory hallucinations.

## 2019-10-09 NOTE — BH NOTES
Treatment team [facility] -     Medical Director: __x___present   Psychiatrist: __x___present   Charge nurse: _x____present   MSW: __x___present   : _____present   Nurse Manager: _____present   Student RNs: _____present   Medical Students: __x___present   Art Therapist: __x___present   Clinical Coordinator: _x____present    Occupational Therapist: _x____present   : _______ present  UR  ___x____ present  Crisis Supervisor_______present      Plan of care discussed and updated as appropriate.

## 2019-10-09 NOTE — GROUP NOTE
IP  GROUP DOCUMENTATION INDIVIDUAL Group Therapy Note Date: October 9 Group Start Time: 2936 Group End Time: 0830 Group Topic: Nursing [facility] 1 SPECIAL TRTMT 1 Seema Cadet RN 
 
[facility] GROUP DOCUMENTATION GROUP Group Therapy Note Attendees: 5 Attendance: Did not attend Mansi Lovelace RN

## 2019-10-10 PROCEDURE — 74011250637 HC RX REV CODE- 250/637: Performed by: PSYCHIATRY & NEUROLOGY

## 2019-10-10 PROCEDURE — 65220000003 HC RM SEMIPRIVATE PSYCH

## 2019-10-10 RX ADMIN — TOPIRAMATE 50 MG: 25 TABLET, FILM COATED ORAL at 17:26

## 2019-10-10 RX ADMIN — SERTRALINE HYDROCHLORIDE 100 MG: 50 TABLET ORAL at 08:18

## 2019-10-10 RX ADMIN — TOPIRAMATE 50 MG: 25 TABLET, FILM COATED ORAL at 08:17

## 2019-10-10 RX ADMIN — PANTOPRAZOLE SODIUM 40 MG: 40 TABLET, DELAYED RELEASE ORAL at 06:36

## 2019-10-10 RX ADMIN — SERTRALINE HYDROCHLORIDE 100 MG: 50 TABLET ORAL at 20:47

## 2019-10-10 RX ADMIN — QUETIAPINE FUMARATE 800 MG: 300 TABLET ORAL at 20:47

## 2019-10-10 RX ADMIN — QUETIAPINE 100 MG: 100 TABLET ORAL at 08:42

## 2019-10-10 RX ADMIN — PANTOPRAZOLE SODIUM 40 MG: 40 TABLET, DELAYED RELEASE ORAL at 17:26

## 2019-10-10 NOTE — GROUP NOTE
SARAHI  GROUP DOCUMENTATION INDIVIDUAL Group Therapy Note Date: October 9 Group Start Time: 1 Group End Time: 2000 Group Topic: Medication [address] 1 SPECIAL TRTMT 1 Delcia Patient, RN 
 
[facility] GROUP DOCUMENTATION GROUP Group Therapy Note Attendees: 9 Attendance: Attended Interventions/techniques: Informed Follows Directions: Followed directions Interactions: Interacted appropriately Mental Status: Calm Behavior/appearance: Cooperative Goals Achieved: Able to listen to others Anita Rubi RN

## 2019-10-10 NOTE — MED STUDENT NOTES
*ATTENTION:  This note has been created by a medical student for educational purposes only. Please do not refer to the content of this note for clinical decision-making, billing, or other purposes. Please see attending physicians note to obtain clinical information on this patient. * Papito Stewart 1970 Hospital day 4 Medical student met with patient this morning. Subjective: 
 
Medication side effects: tired during the day 
  
Mental Status Exam 
Sensorium: alert Orientation: only aware of  time, place and person Relations: cooperative and passive Eye Contact: poor Appearance: is unkempt Thought Process: slow rate of thoughts and poor abstract reasoning/computation Thought Content: delusions and hallucination. No paranoid thoughts. Suicidal: hears voice about it, does not have any plan Homicidal: none Mood: is depressed and is anxious Affect: depressed, constricted Memory: shows no evidence of impairment  
  
Concentration: distractable Abstraction: concrete Insight: The patient shows little insight OR Fair Judgement: is psychologically impaired OR  Fair Assessment/Plan: 
Continues to hear auditory hallucinations of the voice of dead brother, saw vision of him last night. States that he only sees his brother at night when he is trying to go to sleep, and he cant specify exactly what his brother is saying to him. He feels paranoid, but unable to elaborate. He states that he has no energy and it is \"terrible\". His appetite has decreased and he did not finish dinner or breakfast.  He no longer has headaches. His sleep was poor, despite taking Trazodone last night. He states increased dose of Seroquel is making him tired during the day, however he did take trazodone last night which could be contributing to daytime sleepiness. Plan: 
Possibly adjust Quetiapine dose/schedule or Trazodone dosing. Continue close observation, reality orientation Carissa Núñez, Medical Student

## 2019-10-10 NOTE — BSMART NOTE
Pt. Is a 54year old homeless male with history of  Schizoaffective disorder,  Cocaine use disorder and  Cannabis use disorder . Pt. Was admitted to this facility for having ideations to harm self along with auditory hallucinations. EVERARDO Contact:  SW met with pt.  Can you refer me to [facility] instead of [facility]. SW referred pt top [facility].  I still hear voices . The voices are telling me to kill myself. SW discussed safety plan, positive coping skills and provided positive encouragement. Pt. continues to look depressed and helpless. SW will continue to assist the pt with dc planning.

## 2019-10-10 NOTE — GROUP NOTE
[facility] GROUP DOCUMENTATION INDIVIDUAL Group Therapy Note Date: October 10 Group Start Time: 8111 Group End Time: 0830 Group Topic: Nursing [address] 1 SPECIAL TRTMT 1 Quinten Lopez RN 
 
[facility] GROUP DOCUMENTATION GROUP Group Therapy Note Attendees: 7 Attendance: Attended Patient's Goal:  Positive thinking Interventions/techniques: Informed Follows Directions: Followed directions Interactions: Interacted appropriately Mental Status: Calm Behavior/appearance: Cooperative Goals Achieved: Able to engage in interactions and Able to listen to others Additional Notes:   
 Positive thinking verses negative thinking.  
 
Laney Bhagat RN

## 2019-10-10 NOTE — PROGRESS NOTES
Problem: Falls - Risk of  Goal: *Absence of Falls  Description  Document Cricket Galvan Fall Risk and appropriate interventions in the flowsheet.   Outcome: Progressing Towards Goal  Note:   Fall Risk Interventions:            Medication Interventions: Teach patient to arise slowly                   Problem: Suicide  Goal: *STG: Remains safe in hospital  Description  Pt will not engage in any self injurious behaviors while hospitalized    Outcome: Progressing Towards Goal     Problem: Suicide  Goal: *STG:  Verbalizes alternative ways of dealing with maladaptive feelings/behaviors  Description  Pt will identify 3 positive coping skills to utilize when feelings to harm self arise on daily RN assessment    Outcome: Progressing Towards Goal     Problem: Suicide  Goal: *STG/LTG: No longer expresses self destructive or suicidal thoughts  Description  Pt will deny SI on daily RN assessment    Outcome: Progressing Towards Goal     Problem: Suicide  Goal: Interventions  Outcome: Progressing Towards Goal     Problem: Opiate Withdrawal  Goal: *STG: Participates in treatment plan  Description  Pt will actively participate in treatment plan while hospitalized    Outcome: Progressing Towards Goal     Problem: Opiate Withdrawal  Goal: *STG: Seeks staff when symptoms of withdrawal increase  Description  Pt will notify staff at onset of withdrawal symptoms to ensure safe detox while hospitalized    Outcome: Progressing Towards Goal     Problem: Opiate Withdrawal  Goal: *STG: Attends activities and groups  Description  Pt will attend and participate in 3 scheduled groups daily while hospitalized    Outcome: Progressing Towards Goal     Problem: Opiate Withdrawal  Goal: Interventions  Outcome: Progressing Towards Goal     Problem: Altered Thought Process (Adult/Pediatric)  Goal: *STG: Decreased hallucinations  Description  Pt will deny auditory hallucinations on daily RN assessment    Outcome: Progressing Towards Goal     Problem: Altered Thought Process (Adult/Pediatric)  Goal: Interventions  Outcome: Progressing Towards Goal   Patient appears dull but cooperative, patient verbalized still having some auditory hallucinations however it is decreasing but able to contract to safety. Patient remained quiet in his room for most part of the shift, ate 50% dinner and 100% snacks, participated in groups and received medications as scheduled. Patient remained free of falls this shift.  Will continue to monitor

## 2019-10-10 NOTE — PROGRESS NOTES
Behavioral Health Progress Note    Admit Date: 10/5/2019  Hospital day 4    Vitals : No data found. Labs:  No results found for this or any previous visit (from the past 24 hour(s)). Meds:   Current Facility-Administered Medications   Medication Dose Route Frequency    QUEtiapine (SEROquel) tablet 800 mg  800 mg Oral QHS    sertraline (ZOLOFT) tablet 100 mg  100 mg Oral BID    haloperidol lactate (HALDOL) injection 5 mg  5 mg IntraMUSCular Q6H PRN    haloperidol (HALDOL) tablet 5 mg  5 mg Oral Q6H PRN    traZODone (DESYREL) tablet 100 mg  100 mg Oral QHS PRN    acetaminophen (TYLENOL) tablet 650 mg  650 mg Oral Q6H PRN    pantoprazole (PROTONIX) tablet 40 mg  40 mg Oral ACB&D    hydrOXYzine pamoate (VISTARIL) capsule 50 mg  50 mg Oral Q4H PRN    topiramate (TOPAMAX) tablet 50 mg  50 mg Oral BID WITH MEALS    influenza vaccine 2019-20 (6 mos+)(PF) (FLUARIX/FLULAVAL/FLUZONE QUAD) injection 0.5 mL  0.5 mL IntraMUSCular PRIOR TO DISCHARGE      Hospital Problems: Principal Problem:    Schizoaffective disorder, depressive type ([facility] (1/4/2017)    Active Problems:    Cocaine use disorder, severe, dependence ([facility] (1/4/2019)        Subjective:   Medication side effects: fatigue/weakness, hypersomnolence  somnolence    Mental Status Exam  Sensorium: alert  Orientation: only aware of  time, place and person  Relations: passive  Eye Contact: poor  Appearance: sleepy  Thought Process: normal rate of thoughts and poor abstract reasoning/computation   Thought Content: delusions, paranoia and aud halluc   Suicidal: aud halluc to kill self   Homicidal: none   Mood: is depressed   Affect: odd, constricted  Memory: shows no evidence of impairment     Concentration: fair  Abstraction: concrete  Insight: The patient shows little insight    OR Fair  Judgement: is psychologically impaired OR  Fair    Assessment/Plan:   not changed  Says Dr Lou Hernandez had him on Seroquel 800 mg at hs and did not have any side effects and it worked. Says 100 mg in am makes him too sleepy during day, then still cannot sleep at night. Still hearing voices at night that keep him awake. Hears mother saying kill yourself or hears brother's name. Spent most of day in bed including nurse having to take meds to him. Continue close observation, supportive care  Medications: dosage change: hs Seroquel  To 800 mg and discontinue am 100 mg  Risks and benefits of medication were discussed. Potential medication side effects were discussed. Patient was given opportunity to ask questions.

## 2019-10-10 NOTE — BSMART NOTE
OCCUPATIONAL THERAPY PROGRESS NOTE Group Time:  0247 Attendance: The patient attended 1/3 of group. The patient left and returned to activity at least once. Participation: The patient participated with minimal elaboration in the activity. Attention: The patient was able to focus on the activity. Interaction: The patient acknowledges others or responds to questions,  with no spontaneous interaction. Unavailable most of group.

## 2019-10-10 NOTE — BH NOTES
The writer has observed the patient's behavior throughout this shift (0131-3004). Patient has been social with peers and respectful. Patient has attended group sessions (Communnity Meeting and Nursing). Patient has been free of falls and other harms, and has not shown any aggressive behavior during this shift. In addition, patient has taken prescribed medications and staff will continue to monitor the patient's behavior and safety locations.

## 2019-10-11 PROCEDURE — 65220000003 HC RM SEMIPRIVATE PSYCH

## 2019-10-11 PROCEDURE — 74011250637 HC RX REV CODE- 250/637: Performed by: PSYCHIATRY & NEUROLOGY

## 2019-10-11 RX ORDER — TRAZODONE HYDROCHLORIDE 100 MG/1
200 TABLET ORAL
Status: DISCONTINUED | OUTPATIENT
Start: 2019-10-11 | End: 2019-10-21 | Stop reason: HOSPADM

## 2019-10-11 RX ADMIN — QUETIAPINE FUMARATE 800 MG: 300 TABLET ORAL at 20:14

## 2019-10-11 RX ADMIN — SERTRALINE HYDROCHLORIDE 100 MG: 50 TABLET ORAL at 08:12

## 2019-10-11 RX ADMIN — SERTRALINE HYDROCHLORIDE 100 MG: 50 TABLET ORAL at 20:14

## 2019-10-11 RX ADMIN — TOPIRAMATE 50 MG: 25 TABLET, FILM COATED ORAL at 16:32

## 2019-10-11 RX ADMIN — TRAZODONE HYDROCHLORIDE 200 MG: 100 TABLET ORAL at 20:16

## 2019-10-11 RX ADMIN — TOPIRAMATE 50 MG: 25 TABLET, FILM COATED ORAL at 08:12

## 2019-10-11 RX ADMIN — PANTOPRAZOLE SODIUM 40 MG: 40 TABLET, DELAYED RELEASE ORAL at 16:32

## 2019-10-11 RX ADMIN — PANTOPRAZOLE SODIUM 40 MG: 40 TABLET, DELAYED RELEASE ORAL at 06:35

## 2019-10-11 NOTE — BSMART NOTE
ART THERAPY GROUP PROGRESS NOTE Group time:10:00 The patient did not awaken/get up when called for group.

## 2019-10-11 NOTE — GROUP NOTE
SARAHI  GROUP DOCUMENTATION INDIVIDUAL Group Therapy Note Date: October 10 Group Start Time: 2000 Group End Time: 2030 Group Topic: Nursing [facility] 1 SPECIAL TRTMT 1 SUSAN Cabrera  GROUP DOCUMENTATION GROUP Group Therapy Note Attendees: 7 Attendance: Attended Interventions/techniques: Informed Follows Directions: Followed directions Interactions: Interacted appropriately Mental Status: Calm Behavior/appearance: Cooperative Goals Achieved: Able to listen to others Arian Newton RN

## 2019-10-11 NOTE — BSMART NOTE
OCCUPATIONAL THERAPY PROGRESS NOTE Group Time:  1430 Attendance: The patient attended full group. Philippe Inoue Participation: The patient participated with moderate elaboration in the activity. Attention: The patient was able to focus on the activity. Philippe Inoyaneli Interaction: The patient acknowledges others or responds to questions,  with no spontaneous interaction. Continues to appear depressed, more interaction on approach. Answers organized, logical and reality based, a little concrete.

## 2019-10-11 NOTE — BSMART NOTE
Pt. Is a 54year old homeless male with history of  Schizoaffective disorder,  Cocaine use disorder and  Cannabis use disorder . Pt. Was admitted to this facility for having ideations to harm self along with auditory hallucinations. Pt's case was discussed in treatment team this am. EVERARDO informed the team pt was referred to the [facility] [place] per pt. s request.   
  
SW Contact:  SW met with pt to discuss dc planning. \" I still hear voices telling me to kill self\". Pt continues to endorse hearing voices to harm self but contract for safety. SW discussed positive coping skills/ positive self talk. Pt. contracts for safety and denies any ideations to harm self . SW had pt to reflect on safety plan. Pt. plans to go to a SA rehab program once stable on medication. Pt. ask SW to refer him to [facility] since he was declined by [facility]. SW referred pt. pt. has a ;phone assessment on 10/18/19 @ 3:00 . EVERARDO explained to the pt he will not be able to stay at this facility until next Thursday. Pt has an place to stay once dc. Pt. continues to be depressed, has fair insight and cooperative  SW will assist the pt  with dc planning.

## 2019-10-11 NOTE — PROGRESS NOTES
Behavioral Health Progress Note    Admit Date: 10/5/2019  Hospital day 5    Vitals : No data found. Labs:  No results found for this or any previous visit (from the past 24 hour(s)). Meds:   Current Facility-Administered Medications   Medication Dose Route Frequency    traZODone (DESYREL) tablet 200 mg  200 mg Oral QHS    QUEtiapine (SEROquel) tablet 800 mg  800 mg Oral QHS    sertraline (ZOLOFT) tablet 100 mg  100 mg Oral BID    haloperidol lactate (HALDOL) injection 5 mg  5 mg IntraMUSCular Q6H PRN    haloperidol (HALDOL) tablet 5 mg  5 mg Oral Q6H PRN    acetaminophen (TYLENOL) tablet 650 mg  650 mg Oral Q6H PRN    pantoprazole (PROTONIX) tablet 40 mg  40 mg Oral ACB&D    hydrOXYzine pamoate (VISTARIL) capsule 50 mg  50 mg Oral Q4H PRN    topiramate (TOPAMAX) tablet 50 mg  50 mg Oral BID WITH MEALS    influenza vaccine 2019-20 (6 mos+)(PF) (FLUARIX/FLULAVAL/FLUZONE QUAD) injection 0.5 mL  0.5 mL IntraMUSCular PRIOR TO DISCHARGE      Hospital Problems: Principal Problem:    Schizoaffective disorder, depressive type ([facility] (1/4/2017)    Active Problems:    Cocaine use disorder, severe, dependence ([facility] (1/4/2019)        Subjective:   Medication side effects: insomnia  none    Mental Status Exam  Sensorium: alert  Orientation: only aware of  time, place and person  Relations: cooperative  Eye Contact: appropriate  Appearance: shows no evidence of impairment  Thought Process: slow rate of thoughts and poor abstract reasoning/computation   Thought Content: paranoia and halluc   Suicidal: ideas, halluc   Homicidal: none   Mood: is anxious   Affect: constricted, odd  Memory: shows no evidence of impairment     Concentration: fair  Abstraction: concrete  Insight: The patient shows little insight    OR Fair  Judgement: is psychologically impaired OR  Fair    Assessment/Plan:   not changed  Still complains poor sleep.  Had been on Trazodone 200 mg qhs and the Seroquel 800 mg qhs from Dr Anny Evans as O-P without trouble. Will resume. Wants double portions. Still with auditory halluc \"You aren't any good. You might as well kill yourself. \" He was turned down [facility] for substance Tx after stabilized. SW will try [employer]. Has to have halluc and paranoia go away. Continue close observation, Increase Trazodone, Double portions, reality orient, all Seroquel has been switched to hs.

## 2019-10-11 NOTE — GROUP NOTE
SARAHI  GROUP DOCUMENTATION INDIVIDUAL Group Therapy Note Date: October 11 Group Start Time: 0900 Group End Time: 8769 Group Topic: Nursing [facility] 1 SPECIAL TRTMT 1 Joel Carmona RN IP  GROUP DOCUMENTATION GROUP Group Therapy Note Attendees: 6 Attendance: Did not attend Damon Vargas RN
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
## 2019-10-11 NOTE — PROGRESS NOTES
Problem: Falls - Risk of  Goal: *Absence of Falls  Description  Document Lyndsay Morales Fall Risk and appropriate interventions in the flowsheet. Outcome: Progressing Towards Goal  Note:   Fall Risk Interventions:  Medication Interventions: Teach patient to arise slowly  Pt remains free of falls. Problem: Suicide  Goal: *STG: Remains safe in hospital  Description  Pt will not engage in any self injurious behaviors while hospitalized    Outcome: Progressing Towards Goal  Note:   Pt remains safe. Goal: *STG/LTG: No longer expresses self destructive or suicidal thoughts  Description  Pt will deny SI on daily RN assessment    Outcome: Not Progressing Towards Goal  Note:   Pt continues to be depressed and expresses SI sometimes. Patient has been mainly in his room for most of the day but has come out late this afternoon. Patient continues to appear depressed but states he is feeling a little bit better. When questioned about whether he has spoken to his mother he replied no and does not plan to. Patient denies HI and AVH.

## 2019-10-12 PROCEDURE — 74011250637 HC RX REV CODE- 250/637: Performed by: PSYCHIATRY & NEUROLOGY

## 2019-10-12 PROCEDURE — 65220000003 HC RM SEMIPRIVATE PSYCH

## 2019-10-12 RX ORDER — HALOPERIDOL 10 MG/1
10 TABLET ORAL
Status: DISCONTINUED | OUTPATIENT
Start: 2019-10-12 | End: 2019-10-14

## 2019-10-12 RX ADMIN — SERTRALINE HYDROCHLORIDE 100 MG: 50 TABLET ORAL at 20:01

## 2019-10-12 RX ADMIN — TRAZODONE HYDROCHLORIDE 200 MG: 100 TABLET ORAL at 20:01

## 2019-10-12 RX ADMIN — TOPIRAMATE 50 MG: 25 TABLET, FILM COATED ORAL at 08:02

## 2019-10-12 RX ADMIN — PANTOPRAZOLE SODIUM 40 MG: 40 TABLET, DELAYED RELEASE ORAL at 08:02

## 2019-10-12 RX ADMIN — PANTOPRAZOLE SODIUM 40 MG: 40 TABLET, DELAYED RELEASE ORAL at 16:40

## 2019-10-12 RX ADMIN — TOPIRAMATE 50 MG: 25 TABLET, FILM COATED ORAL at 16:40

## 2019-10-12 RX ADMIN — QUETIAPINE FUMARATE 800 MG: 300 TABLET ORAL at 20:01

## 2019-10-12 RX ADMIN — ACETAMINOPHEN 650 MG: 325 TABLET ORAL at 12:46

## 2019-10-12 RX ADMIN — HALOPERIDOL 10 MG: 10 TABLET ORAL at 20:01

## 2019-10-12 RX ADMIN — SERTRALINE HYDROCHLORIDE 100 MG: 50 TABLET ORAL at 08:02

## 2019-10-12 NOTE — PROGRESS NOTES
Behavioral Health Progress Note    Admit Date: 10/5/2019  Hospital day 6    Vitals : No data found. Labs:  No results found for this or any previous visit (from the past 24 hour(s)). Meds:   Current Facility-Administered Medications   Medication Dose Route Frequency    traZODone (DESYREL) tablet 200 mg  200 mg Oral QHS    QUEtiapine (SEROquel) tablet 800 mg  800 mg Oral QHS    sertraline (ZOLOFT) tablet 100 mg  100 mg Oral BID    haloperidol lactate (HALDOL) injection 5 mg  5 mg IntraMUSCular Q6H PRN    haloperidol (HALDOL) tablet 5 mg  5 mg Oral Q6H PRN    acetaminophen (TYLENOL) tablet 650 mg  650 mg Oral Q6H PRN    pantoprazole (PROTONIX) tablet 40 mg  40 mg Oral ACB&D    hydrOXYzine pamoate (VISTARIL) capsule 50 mg  50 mg Oral Q4H PRN    topiramate (TOPAMAX) tablet 50 mg  50 mg Oral BID WITH MEALS    influenza vaccine 2019-20 (6 mos+)(PF) (FLUARIX/FLULAVAL/FLUZONE QUAD) injection 0.5 mL  0.5 mL IntraMUSCular PRIOR TO DISCHARGE      Hospital Problems: Principal Problem:    Schizoaffective disorder, depressive type ([facility] (1/4/2017)    Active Problems:    Cocaine use disorder, severe, dependence ([facility] (1/4/2019)        Subjective:   Medication side effects: none  none    Mental Status Exam  Sensorium: alert  Orientation: only aware of  time, place and person  Relations: cooperative  Eye Contact: appropriate  Appearance: is bizarre  Thought Process: normal rate of thoughts and poor abstract reasoning/computation   Thought Content: paranoia and halluc   Suicidal: command halluc   Homicidal: none   Mood: is anxious   Affect: odd  Memory: is impaired and is recent     Concentration: distractable  Abstraction: concrete  Insight: The patient shows little insight    OR Fair  Judgement: is psychologically impaired OR  Fair    Assessment/Plan:   not changed  Getting HA again. Has had a HA off and on for years. He does not know of a precipitant. Worse w/ bright lights, fluorescent lights, loud noises.  Lessened aud halluc but still present saying to hurt himself. Still unhappy and anxious. He insists that he has not been able to be stabilized on a single antipsychotic and records do show that he has required combo of Seroquel and Haldol in past to be able to get out of hospital. Will restart Haldol 10 mg at hs in addition to the already ordered Seroquel 800 mg Has required an even higher dose Haldol in past but will start at this. Continue close observation, supportive care.

## 2019-10-12 NOTE — BH NOTES
Quiet withdrawn dull flat sad. Eats and tolerates evening meal. Stays to self in room. Depressed. Takes medicines without incident. Gripper socks and 15 minute checks in place for safety. Gait appropriate. Does attend RN group. Will continue to monitor and support.

## 2019-10-12 NOTE — GROUP NOTE
SARAHI  GROUP DOCUMENTATION INDIVIDUAL Group Therapy Note Date: October 12 Group Start Time: 7938 Group End Time: 8144 Group Topic: Nursing [facility] 1 SPECIAL TRTMT 1 Seema Cadet RN 
 
[facility] GROUP DOCUMENTATION GROUP Group Therapy Note Attendees: 3 Attendance: Attended Patient's Goal:  Medication teaching Interventions/techniques: Informed Follows Directions: Followed directions Interactions: Interacted appropriately Mental Status: Calm Behavior/appearance: Cooperative Goals Achieved: Able to engage in interactions and Able to listen to others Mansi Lovelace RN

## 2019-10-12 NOTE — BSMART NOTE
ART THERAPY GROUP PROGRESS NOTE Group time: 900 The patient did not awaken/get up when called for group.

## 2019-10-12 NOTE — PROGRESS NOTES
Problem: Suicide  Goal: *STG: Remains safe in hospital  Description  Pt will not engage in any self injurious behaviors while hospitalized    10/12/2019 1020 by Chris Gonsalez RN  Outcome: Progressing Towards Goal  10/12/2019 1020 by Chris Gonsalez RN  Outcome: Progressing Towards Goal  Goal: *STG:  Verbalizes alternative ways of dealing with maladaptive feelings/behaviors  Description  Pt will identify 3 positive coping skills to utilize when feelings to harm self arise on daily RN assessment    Outcome: Progressing Towards Goal  Goal: *STG/LTG: No longer expresses self destructive or suicidal thoughts  Description  Pt will deny SI on daily RN assessment    Outcome: Progressing Towards Goal     Problem: Altered Thought Process (Adult/Pediatric)  Goal: *STG: Decreased hallucinations  Description  Pt will deny auditory hallucinations on daily RN assessment    Outcome: Progressing Towards Goal  Patient states he is doing better, denies auditory hallucinations, SI. Attending groups. Medication compliant.

## 2019-10-13 PROCEDURE — 74011250637 HC RX REV CODE- 250/637: Performed by: PSYCHIATRY & NEUROLOGY

## 2019-10-13 PROCEDURE — 65220000003 HC RM SEMIPRIVATE PSYCH

## 2019-10-13 RX ADMIN — TOPIRAMATE 50 MG: 25 TABLET, FILM COATED ORAL at 16:50

## 2019-10-13 RX ADMIN — PANTOPRAZOLE SODIUM 40 MG: 40 TABLET, DELAYED RELEASE ORAL at 06:42

## 2019-10-13 RX ADMIN — QUETIAPINE FUMARATE 800 MG: 300 TABLET ORAL at 20:47

## 2019-10-13 RX ADMIN — TRAZODONE HYDROCHLORIDE 200 MG: 100 TABLET ORAL at 20:47

## 2019-10-13 RX ADMIN — PANTOPRAZOLE SODIUM 40 MG: 40 TABLET, DELAYED RELEASE ORAL at 16:50

## 2019-10-13 RX ADMIN — TOPIRAMATE 50 MG: 25 TABLET, FILM COATED ORAL at 08:02

## 2019-10-13 RX ADMIN — HALOPERIDOL 10 MG: 10 TABLET ORAL at 20:47

## 2019-10-13 RX ADMIN — SERTRALINE HYDROCHLORIDE 100 MG: 50 TABLET ORAL at 08:02

## 2019-10-13 RX ADMIN — SERTRALINE HYDROCHLORIDE 100 MG: 50 TABLET ORAL at 20:47

## 2019-10-13 NOTE — GROUP NOTE
SARAHI  GROUP DOCUMENTATION INDIVIDUAL Group Therapy Note Date: October 13 Group Start Time: 2222 Group End Time: 0830 Group Topic: Nursing [facility] 1 SPECIAL TRTMT 1 SUSAN Trent  GROUP DOCUMENTATION GROUP Group Therapy Note Attendees: 5 Attendance: Attended Patient's Goal:  Wellness and Mental Illness Interventions/techniques: Informed Follows Directions: Followed directions Interactions: Interacted appropriately Mental Status: Calm Behavior/appearance: Cooperative Goals Achieved: Able to engage in interactions and Able to listen to others Wolf Cesar RN

## 2019-10-13 NOTE — BSMART NOTE
ART THERAPY GROUP PROGRESS NOTE Group time:900 The patient did not awaken/get up when called for group.

## 2019-10-13 NOTE — BH NOTES
Pt calm and cooperative. Pt appears happy and animated, interacting appropriately with staff and peers. Pt out in milieu majority of shift watching TV or talking with peers and staff. Pt compliant with all meds. Pt has not required any PRNs. Will continue to monitor for safety.

## 2019-10-13 NOTE — PROGRESS NOTES
Behavioral Health Progress Note    Admit Date: 10/5/2019  Hospital day 7    Vitals :   Patient Vitals for the past 8 hrs:   BP Temp Pulse Resp   10/13/19 0745 102/68 97.2 °F (36.2 °C) 67 18     Labs:  No results found for this or any previous visit (from the past 24 hour(s)).   Meds:   Current Facility-Administered Medications   Medication Dose Route Frequency    haloperidol (HALDOL) tablet 10 mg  10 mg Oral QHS    traZODone (DESYREL) tablet 200 mg  200 mg Oral QHS    QUEtiapine (SEROquel) tablet 800 mg  800 mg Oral QHS    sertraline (ZOLOFT) tablet 100 mg  100 mg Oral BID    haloperidol lactate (HALDOL) injection 5 mg  5 mg IntraMUSCular Q6H PRN    haloperidol (HALDOL) tablet 5 mg  5 mg Oral Q6H PRN    acetaminophen (TYLENOL) tablet 650 mg  650 mg Oral Q6H PRN    pantoprazole (PROTONIX) tablet 40 mg  40 mg Oral ACB&D    hydrOXYzine pamoate (VISTARIL) capsule 50 mg  50 mg Oral Q4H PRN    topiramate (TOPAMAX) tablet 50 mg  50 mg Oral BID WITH MEALS    influenza vaccine 2019-20 (6 mos+)(PF) (FLUARIX/FLULAVAL/FLUZONE QUAD) injection 0.5 mL  0.5 mL IntraMUSCular PRIOR TO DISCHARGE      Hospital Problems: Principal Problem:    Schizoaffective disorder, depressive type ([facility] (1/4/2017)    Active Problems:    Cocaine use disorder, severe, dependence ([facility] (1/4/2019)        Subjective:   Medication side effects: none  none    Mental Status Exam  Sensorium: alert  Orientation: only aware of  time, place and person  Relations: cooperative  Eye Contact: appropriate  Appearance: shows no evidence of impairment  Thought Process: normal rate of thoughts and fair abstract reasoning/computation   Thought Content: paranoia and halluc   Suicidal: ideas   Homicidal: none   Mood: is anxious and unhappy   Affect: stable  Memory: shows no evidence of impairment     Concentration: intact  Abstraction: abstract  Insight: The patient's insight shows no evidence of impairment    OR Fair  Judgement: is psychologically impaired OR Fair    Assessment/Plan:   not changed  HA is gone,does not know  Why they come and go, did take Tylenol for it. A little groggy this am going back on the Haldol with Seroquel. We discussed this again and how his dr on outside has had to keep him on both to make voices go away. Still has aud halluc of negative and derogatory nature. Still dwells on no resources out of hosp. No trouble w/ orthostasis , urination or constipation.     Continue close observation,

## 2019-10-13 NOTE — PROGRESS NOTES
Problem: Falls - Risk of  Goal: *Absence of Falls  Description  Document Hayde Ruts Fall Risk and appropriate interventions in the flowsheet. Outcome: Progressing Towards Goal  Note:   Fall Risk Interventions:            Medication Interventions: Teach patient to arise slowly                   Problem: Suicide  Goal: *STG/LTG: No longer expresses self destructive or suicidal thoughts  Description  Pt will deny SI on daily RN assessment    Outcome: Progressing Towards Goal     Problem: Altered Thought Process (Adult/Pediatric)  Goal: *STG: Decreased hallucinations  Description  Pt will deny auditory hallucinations on daily RN assessment    Outcome: Progressing Towards Goal   Patient was up for meals and group. Patient has a flat affect , stated voices have lessen. Contracts for safety.

## 2019-10-14 PROCEDURE — 74011250637 HC RX REV CODE- 250/637: Performed by: PSYCHIATRY & NEUROLOGY

## 2019-10-14 PROCEDURE — 74011250636 HC RX REV CODE- 250/636: Performed by: PSYCHIATRY & NEUROLOGY

## 2019-10-14 PROCEDURE — 65220000003 HC RM SEMIPRIVATE PSYCH

## 2019-10-14 RX ORDER — PROMETHAZINE HYDROCHLORIDE 25 MG/ML
25 INJECTION, SOLUTION INTRAMUSCULAR; INTRAVENOUS
Status: DISCONTINUED | OUTPATIENT
Start: 2019-10-14 | End: 2019-10-21 | Stop reason: HOSPADM

## 2019-10-14 RX ADMIN — ACETAMINOPHEN 650 MG: 325 TABLET ORAL at 10:31

## 2019-10-14 RX ADMIN — PANTOPRAZOLE SODIUM 40 MG: 40 TABLET, DELAYED RELEASE ORAL at 16:54

## 2019-10-14 RX ADMIN — TOPIRAMATE 50 MG: 25 TABLET, FILM COATED ORAL at 08:55

## 2019-10-14 RX ADMIN — SERTRALINE HYDROCHLORIDE 100 MG: 50 TABLET ORAL at 08:55

## 2019-10-14 RX ADMIN — PANTOPRAZOLE SODIUM 40 MG: 40 TABLET, DELAYED RELEASE ORAL at 06:44

## 2019-10-14 RX ADMIN — SERTRALINE HYDROCHLORIDE 100 MG: 50 TABLET ORAL at 20:34

## 2019-10-14 RX ADMIN — PROMETHAZINE HYDROCHLORIDE 25 MG: 25 INJECTION INTRAMUSCULAR; INTRAVENOUS at 10:31

## 2019-10-14 RX ADMIN — QUETIAPINE FUMARATE 800 MG: 300 TABLET ORAL at 20:34

## 2019-10-14 RX ADMIN — TOPIRAMATE 50 MG: 25 TABLET, FILM COATED ORAL at 16:54

## 2019-10-14 RX ADMIN — TRAZODONE HYDROCHLORIDE 200 MG: 100 TABLET ORAL at 20:34

## 2019-10-14 RX ADMIN — HALOPERIDOL 15 MG: 10 TABLET ORAL at 20:33

## 2019-10-14 NOTE — GROUP NOTE
[facility] GROUP DOCUMENTATION INDIVIDUAL Group Therapy Note Date: October 13 Group Start Time: 2015 Group End Time: 2045 Group Topic: Nursing [address] 1 SPECIAL TRTMT 1 Rosette Leyden, RN 
 
[facility] GROUP DOCUMENTATION GROUP Group Therapy Note Attendees: 7 Attendance: Attended Interventions/techniques: Informed Follows Directions: Followed directions Interactions: Interacted appropriately Mental Status: Calm Behavior/appearance: Cooperative Goals Achieved: Able to engage in interactions and Able to listen to others Corrine Monahan RN

## 2019-10-14 NOTE — PROGRESS NOTES
10/14/19 [place] work  (Discharge Planning and Positve Thoughts and Affirmations)   Attendance Attended   Number of participants 4   Time in 1515   Time out 1611   Total Time 56   MTP problem Chemical dependency relapse;Risk of harm to self or others   Interventions/techniques Informed;Provided feedback; Supported   Follows directions Followed directions   Interactions Interacted appropriately   Mental Status Anxious;Depressed   Behavior/appearance Attentive;Disheveled; Cooperative   Long Term Risk of Suicide Low   Immediate Risk for Suicide Low   Suicide Protective Factors Contacts reliable for safety   Risk of Violence Low   Goals Achieved Able to listen to others; Able to give feedback to another

## 2019-10-14 NOTE — PROGRESS NOTES
NUTRITION    Nutrition Screen     RECOMMENDATIONS / PLAN:     - Monitor diet tolerance. - Continue RD inpatient monitoring and evaluation. NUTRITION DIAGNOSIS & INTERVENTIONS:     - Meals/snacks: general healthy diet: double portions per MD order    Nutrition Diagnosis: Altered GI function related to unknown etiology as evidenced by pt with c/o nausea and vomiting this am.      ASSESSMENT:     Admitted after presentation to the ED saying he had taken medications in an attempt to harm himself, also experiencing auditory hallucinations. Pt reports good meal intake and appetite until this am when he was experiencing some nausea and emesis this am after breakfast. Received phenergan. BM 10/12 per pt. Nutritional intake adequate to meet patients estimated nutritional needs:  Yes    Diet: DIET REGULAR Double Portions    Food Allergies: NKFA  Current Appetite: Good  Appetite/meal intake prior to admission: Good  Feeding Limitations:  [] Swallowing Difficulty       [] Chewing Difficulty       [] Other   Current Meal Intake: No data found. Gastrointestinal Issues:  [x] Yes: c/o some nausea and vomiting this am, received prn phenergan  Skin Integrity:  WDL  Pertinent Medications:  Reviewed: protonix, phenergan   Labs:  Reviewed     Anthropometrics:  Ht Readings from Last 1 Encounters:   09/26/19 6' (1.829 m)       There were no vitals filed for this visit.     There is no height or weight on file to calculate BMI.  29.9 kg/(m^2) using previous ht and wt documented on 9/26/19    Weight History:   Weight Metrics 9/26/2019 9/24/2019 8/28/2019 4/17/2019 4/15/2019 4/8/2019 4/2/2019   Weight 220 lb 0.3 oz 220 lb 206 lb 2.1 oz 220 lb 0.3 oz 208 lb 15.9 oz 220 lb 220 lb   BMI 29.84 kg/m2 29.84 kg/m2 27.96 kg/m2 29.84 kg/m2 28.34 kg/m2 29.84 kg/m2 29.84 kg/m2       Admitting Diagnosis: Schizophrenia ([facility] [F20.9]  Past Medical History:   Diagnosis Date    Adenomatous colon polyp     Anxiety     Arthritis     Asthma     Back pain     CPAP (continuous positive airway pressure) dependence     Depression     Dizziness     Gallbladder disease     GERD (gastroesophageal reflux disease)     Hemorrhoids     Joint pain     Migraine     Neck pain     Pancreatitis     PUD (peptic ulcer disease)     Rectal bleeding     Schizoaffective disorder (HCC)     Sleep apnea     uses c pap        Education Needs:        [x] None identified  [] Identified - Not appropriate at this time  []  Identified and addressed - refer to education log  Learning Limitations:   [x] None identified  [] Identified    Cultural, Denominational & ethnic food preferences identified:  [x] None    [] Yes      ESTIMATED NUTRITION NEEDS:     9602-7704 kcal (MSJx1-1.3),  gm protein (0.8-1 gm/kg), 1 mL/kcal  Based on: 100 kg       [x] Actual BW- previous weight documented on 9/26/19         MONITORING & EVALUATION:     Nutrition Goal(s):   - PO nutrition intake will meet >75% of patient estimated nutritional needs within the next 7 days. Outcome: New/Initial goal     Monitor:  [x] Food and beverage intake   [x] Diet order   [x] Nutrition-focused physical findings   [] Weight      Previous Recommendations (for follow-up assessments only):    Not Applicable      Discharge Planning: No nutritional discharge needs at this time.    [x]  Participated in care planning, discharge planning, & interdisciplinary rounds as appropriate      Felipa Rosario RD   Pager: [phone]

## 2019-10-14 NOTE — BSMART NOTE
OCCUPATIONAL THERAPY PROGRESS NOTE Group Time:  1430 Attendance: The patient attended full group. Rhea Groves Participation: The patient participated with moderate elaboration in the activity. Attention: The patient was able to focus on the activity. Interaction: The patient occasionally  interacts with others. Mood slightly brighter.

## 2019-10-14 NOTE — BSMART NOTE
Pt. Is a 54year old homeless male with history of  Schizoaffective disorder,  Cocaine use disorder and  Cannabis use disorder . Pt. Was admitted to this facility for having ideations to harm self along with auditory hallucinations. Pt's case was discussed case with the psychiatrist. Pt has an interview with [facility] on 10/17/19 @ 3:00 pm .  The doctor reports he plans to make adjustments with pt medication due to pt  continues to hear voices telling him to harm self. SW Contact:  SW met with pt to discuss dc planning. Pt. reports he is still feels depressed and  hears voices to harm self. SW discussed positive self talk. Pt. Contracts for safety. \"Pt. continues to be depressed, has fair insight and cooperative  SW will assist the pt  with dc planning.

## 2019-10-14 NOTE — PROGRESS NOTES
Problem: Falls - Risk of  Goal: *Absence of Falls  Description  Document Francine Zimmerman Fall Risk and appropriate interventions in the flowsheet. Outcome: Progressing Towards Goal  Note:   Fall Risk Interventions:            Medication Interventions: Teach patient to arise slowly                   Problem: Suicide  Goal: *STG: Remains safe in hospital  Description  Pt will not engage in any self injurious behaviors while hospitalized    Outcome: Progressing Towards Goal  Goal: *STG:  Verbalizes alternative ways of dealing with maladaptive feelings/behaviors  Description  Pt will identify 3 positive coping skills to utilize when feelings to harm self arise on daily RN assessment    Outcome: Progressing Towards Goal  Goal: *STG/LTG: No longer expresses self destructive or suicidal thoughts  Description  Pt will deny SI on daily RN assessment    Outcome: Progressing Towards Goal  Goal: Interventions  Outcome: Progressing Towards Goal     Problem: Opiate Withdrawal  Goal: *STG: Participates in treatment plan  Description  Pt will actively participate in treatment plan while hospitalized    Outcome: Progressing Towards Goal  Goal: *STG: Seeks staff when symptoms of withdrawal increase  Description  Pt will notify staff at onset of withdrawal symptoms to ensure safe detox while hospitalized    Outcome: Progressing Towards Goal  Goal: *STG: Attends activities and groups  Description  Pt will attend and participate in 3 scheduled groups daily while hospitalized    Outcome: Progressing Towards Goal  Goal: Interventions  Outcome: Progressing Towards Goal     Problem: Altered Thought Process (Adult/Pediatric)  Goal: *STG: Decreased hallucinations  Description  Pt will deny auditory hallucinations on daily RN assessment    Outcome: Progressing Towards Goal  Goal: Interventions  Outcome: Progressing Towards Goal   Pt is compliant with medications. He is not attending groups today but does not feel well physically.  He complained of headache and vomiting. Pt was medicated with Tylenol and Phenergan and has been sleeping. MD is aware of headache and vomiting. He endorses auditory hallucinations and states he is suicidal. Pt does verbally contract for safety.

## 2019-10-14 NOTE — PROGRESS NOTES
Behavioral Health Progress Note    Admit Date: 10/5/2019  Hospital day 8    Vitals :   Patient Vitals for the past 8 hrs:   BP Temp Pulse Resp   10/14/19 0748 106/78 98.1 °F (36.7 °C) 78 22     Labs:  No results found for this or any previous visit (from the past 24 hour(s)).   Meds:   Current Facility-Administered Medications   Medication Dose Route Frequency    promethazine (PHENERGAN) injection 25 mg  25 mg IntraMUSCular Q6H PRN    haloperidol (HALDOL) tablet 15 mg  15 mg Oral QHS    traZODone (DESYREL) tablet 200 mg  200 mg Oral QHS    QUEtiapine (SEROquel) tablet 800 mg  800 mg Oral QHS    sertraline (ZOLOFT) tablet 100 mg  100 mg Oral BID    haloperidol lactate (HALDOL) injection 5 mg  5 mg IntraMUSCular Q6H PRN    haloperidol (HALDOL) tablet 5 mg  5 mg Oral Q6H PRN    acetaminophen (TYLENOL) tablet 650 mg  650 mg Oral Q6H PRN    pantoprazole (PROTONIX) tablet 40 mg  40 mg Oral ACB&D    hydrOXYzine pamoate (VISTARIL) capsule 50 mg  50 mg Oral Q4H PRN    topiramate (TOPAMAX) tablet 50 mg  50 mg Oral BID WITH MEALS    influenza vaccine 2019-20 (6 mos+)(PF) (FLUARIX/FLULAVAL/FLUZONE QUAD) injection 0.5 mL  0.5 mL IntraMUSCular PRIOR TO DISCHARGE      Hospital Problems: Principal Problem:    Schizoaffective disorder, depressive type ([facility] (1/4/2017)    Active Problems:    Cocaine use disorder, severe, dependence ([facility] (1/4/2019)        Subjective:   Medication side effects: HA  HA    Mental Status Exam  Sensorium: alert  Orientation: only aware of  time, place and person  Relations: cooperative  Eye Contact: appropriate  Appearance: shows no evidence of impairment  Thought Process: normal rate of thoughts and good abstract reasoning/computation   Thought Content: delusions, paranoia and halluc   Suicidal: idaes   Homicidal: none   Mood: is anxious and is sad   Affect: congruent  Memory: shows no evidence of impairment     Concentration: fair  Abstraction: concrete  Insight: The patient shows little insight    OR Fair  Judgement: shows no evidence of impairment OR  Fair    Assessment/Plan:   not changed  Still hearing voices, though less last night, even after added Haldol 10 mg. Hears voice of mother saying he should kill self. SW has been in contact w/ his father. Had been on 15 mg in past will resume w/ the Seroquel. Has HA again, he does not know why they come and go. Has seen drs for this in past. Was vomiting w/ HA today, order for Phenergan PRN. Continue close observation, supportive care.

## 2019-10-14 NOTE — PROGRESS NOTES
10/14/19 Luisa Napier  (anger management)   Attendance Did not attend   Encouraged to participate but pt not feeling well. He was medicated for headache and nausea and vomiting.

## 2019-10-15 PROCEDURE — 74011250637 HC RX REV CODE- 250/637: Performed by: PSYCHIATRY & NEUROLOGY

## 2019-10-15 PROCEDURE — 65220000003 HC RM SEMIPRIVATE PSYCH

## 2019-10-15 RX ADMIN — TOPIRAMATE 50 MG: 25 TABLET, FILM COATED ORAL at 08:10

## 2019-10-15 RX ADMIN — PANTOPRAZOLE SODIUM 40 MG: 40 TABLET, DELAYED RELEASE ORAL at 08:10

## 2019-10-15 RX ADMIN — TOPIRAMATE 50 MG: 25 TABLET, FILM COATED ORAL at 16:31

## 2019-10-15 RX ADMIN — SERTRALINE HYDROCHLORIDE 100 MG: 50 TABLET ORAL at 20:15

## 2019-10-15 RX ADMIN — PANTOPRAZOLE SODIUM 40 MG: 40 TABLET, DELAYED RELEASE ORAL at 16:32

## 2019-10-15 RX ADMIN — QUETIAPINE FUMARATE 800 MG: 300 TABLET ORAL at 20:15

## 2019-10-15 RX ADMIN — TRAZODONE HYDROCHLORIDE 200 MG: 100 TABLET ORAL at 20:15

## 2019-10-15 RX ADMIN — HALOPERIDOL 15 MG: 10 TABLET ORAL at 20:16

## 2019-10-15 RX ADMIN — SERTRALINE HYDROCHLORIDE 100 MG: 50 TABLET ORAL at 08:10

## 2019-10-15 NOTE — BSMART NOTE
COUNSELING GROUP PROGRESS NOTE Group time: 13:00 The patient did not awaken/get up when called for group.

## 2019-10-15 NOTE — BH NOTES
Pt displayed anger but was able to stay under control. Pt told staff that a peer's behavior was triggering him. Staff encouraged pt to ignore the negativity. Pt was able to comply and was able to calm self down. Pt sat in milieu during shift and interacted with peers and staff positively. Pt was able to eat meals and attend group. Pt was able to comply to unit protocols.

## 2019-10-15 NOTE — BSMART NOTE
Pt. Is a 54year old homeless male with history of  Schizoaffective disorder,  Cocaine use disorder and  Cannabis use disorder . Pt. Was admitted to this facility for having ideations to harm self along with auditory hallucinations.  
  
Pt's case was discussed case with the psychiatrist. Pt has an interview with [facility] on 10/17/19 @ 3:00 pm .   
  
SW Contact:  SW met with pt to discuss dc planning. Pt. expressed he is feeling better. Pt reports he is no longer hearing voices. Pt continues to contract for safety . SW discussed positive coping skills. SW had pt to reflect on safety plan. Pt expressed he know it is important for him to take medications.    Pt. continues to be cooperative, appears depressed and has fair insight. SW will assist the pt  with dc planning.  DC plan pt. Will explore going to crisis stabilization program in [facility]. Pt. Than can transition to [facility] if accepted.

## 2019-10-15 NOTE — PROGRESS NOTES
Problem: Falls - Risk of  Goal: *Absence of Falls  Description  Document Heaven Arreaga Fall Risk and appropriate interventions in the flowsheet. Outcome: Progressing Towards Goal  Note:   Fall Risk Interventions:            Medication Interventions: Teach patient to arise slowly                   Problem: Suicide  Goal: *STG: Remains safe in hospital  Description  Pt will not engage in any self injurious behaviors while hospitalized    Outcome: Progressing Towards Goal  Goal: *STG:  Verbalizes alternative ways of dealing with maladaptive feelings/behaviors  Description  Pt will identify 3 positive coping skills to utilize when feelings to harm self arise on daily RN assessment    Outcome: Progressing Towards Goal  Goal: *STG/LTG: No longer expresses self destructive or suicidal thoughts  Description  Pt will deny SI on daily RN assessment    Outcome: Progressing Towards Goal   Patient states he is feeling better. Eduardo SI/ AV. Encouraged to attend groups. Medication complaint.

## 2019-10-15 NOTE — GROUP NOTE
[facility]  GROUP DOCUMENTATION INDIVIDUAL Group Therapy Note Date: October 14 Group Start Time: 2000 Group End Time: 2015 Group Topic: Nursing [address] 1 SPECIAL TRTMT 1 Gagan Tolentino [facility] GROUP DOCUMENTATION GROUP Group Therapy Note Attendees: 4 Attendance: Attended Interventions/techniques: Informed Follows Directions: Followed directions Interactions: Interacted appropriately Mental Status: Congruent Behavior/appearance: Attentive Goals Achieved: Able to listen to others Shanell Hutchinson

## 2019-10-15 NOTE — BSMART NOTE
ART THERAPY GROUP PROGRESS NOTE PATIENT SCHEDULED FOR GROUP AT: 1000 ATTENDANCE: Full PARTICIPATION LEVEL: Participates fully in the art process ATTENTION LEVEL : Able to focus on task FOCUS: Goals SYMBOLIC & THEMATIC CONTENT AS NOTED IN IMAGERY: He initially was apprehensive and had difficulty identifying a goal he was working on. He was responsive to encouragement and became more invested as the group progressed. His overall mood was calm with a bright affect. He shared that he wants to work on sobriety, and claimed that he has gone to the [facility] before and would like to attempt \"rehab and  
[facility] again. \"

## 2019-10-15 NOTE — PROGRESS NOTES
Behavioral Health Progress Note    Admit Date: 10/5/2019  Hospital day 9    Vitals :   Patient Vitals for the past 8 hrs:   BP Temp Pulse Resp   10/15/19 0730 102/69 98.6 °F (37 °C) 81 18     Labs:  No results found for this or any previous visit (from the past 24 hour(s)).   Meds:   Current Facility-Administered Medications   Medication Dose Route Frequency    promethazine (PHENERGAN) injection 25 mg  25 mg IntraMUSCular Q6H PRN    haloperidol (HALDOL) tablet 15 mg  15 mg Oral QHS    traZODone (DESYREL) tablet 200 mg  200 mg Oral QHS    QUEtiapine (SEROquel) tablet 800 mg  800 mg Oral QHS    sertraline (ZOLOFT) tablet 100 mg  100 mg Oral BID    haloperidol lactate (HALDOL) injection 5 mg  5 mg IntraMUSCular Q6H PRN    haloperidol (HALDOL) tablet 5 mg  5 mg Oral Q6H PRN    acetaminophen (TYLENOL) tablet 650 mg  650 mg Oral Q6H PRN    pantoprazole (PROTONIX) tablet 40 mg  40 mg Oral ACB&D    hydrOXYzine pamoate (VISTARIL) capsule 50 mg  50 mg Oral Q4H PRN    topiramate (TOPAMAX) tablet 50 mg  50 mg Oral BID WITH MEALS    influenza vaccine 2019-20 (6 mos+)(PF) (FLUARIX/FLULAVAL/FLUZONE QUAD) injection 0.5 mL  0.5 mL IntraMUSCular PRIOR TO DISCHARGE      Hospital Problems: Principal Problem:    Schizoaffective disorder, depressive type ([facility] (1/4/2017)    Active Problems:    Cocaine use disorder, severe, dependence ([facility] (1/4/2019)        Subjective:   Medication side effects: none  none    Mental Status Exam  Sensorium: alert  Orientation: only aware of  time, place and person  Relations: cooperative  Eye Contact: poor  Appearance: laying in bed staying away from others  Thought Process: normal rate of thoughts and poor abstract reasoning/computation   Thought Content: no evidence of impairment   Suicidal: denies   Homicidal: none   Mood: is anxious   Affect: stable  Memory: shows no evidence of impairment     Concentration: fair  Abstraction: concrete  Insight: The patient's insight shows no evidence of impairment    OR Fair  Judgement: is psychologically impaired OR  Fair    Assessment/Plan:   improved  No aud halluc or SI today. This is first day for this to be clearing. Still dysphoric but less so. No GI complaints. SW tells me that she spoke to father and then pt did. He had been worried about this after father said he could not handle it. He may be able to go to [facility] as a follow up option.  Continue close observation, supportive care, meds as is

## 2019-10-15 NOTE — BSMART NOTE
OCCUPATIONAL THERAPY PROGRESS NOTE Group Time:  1430 Attendance: The patient attended full group. Konrad Jeffers Participation: The patient participated with moderate elaboration in the activity. Attention: The patient was able to focus on the activity. Interaction: The patient acknowledges others or responds to questions,  with no spontaneous interaction.

## 2019-10-15 NOTE — GROUP NOTE
[facility]  GROUP DOCUMENTATION INDIVIDUAL Group Therapy Note Date: October 15 Group Start Time: 0830 Group End Time: 0756 Group Topic: Nursing [facility] 1 SPECIAL TRTMT 1 SUSAN Owens  GROUP DOCUMENTATION GROUP Group Therapy Note Attendees: 6 Attendance: Did not attend Nahomi Decker RN

## 2019-10-16 PROCEDURE — 74011250637 HC RX REV CODE- 250/637: Performed by: PSYCHIATRY & NEUROLOGY

## 2019-10-16 PROCEDURE — 65220000003 HC RM SEMIPRIVATE PSYCH

## 2019-10-16 RX ADMIN — SERTRALINE HYDROCHLORIDE 100 MG: 50 TABLET ORAL at 20:03

## 2019-10-16 RX ADMIN — TRAZODONE HYDROCHLORIDE 200 MG: 100 TABLET ORAL at 20:04

## 2019-10-16 RX ADMIN — QUETIAPINE FUMARATE 800 MG: 300 TABLET ORAL at 20:02

## 2019-10-16 RX ADMIN — PANTOPRAZOLE SODIUM 40 MG: 40 TABLET, DELAYED RELEASE ORAL at 15:49

## 2019-10-16 RX ADMIN — HALOPERIDOL 15 MG: 10 TABLET ORAL at 20:03

## 2019-10-16 RX ADMIN — PANTOPRAZOLE SODIUM 40 MG: 40 TABLET, DELAYED RELEASE ORAL at 06:51

## 2019-10-16 RX ADMIN — TOPIRAMATE 50 MG: 25 TABLET, FILM COATED ORAL at 16:09

## 2019-10-16 RX ADMIN — SERTRALINE HYDROCHLORIDE 100 MG: 50 TABLET ORAL at 08:14

## 2019-10-16 RX ADMIN — TOPIRAMATE 50 MG: 25 TABLET, FILM COATED ORAL at 08:14

## 2019-10-16 NOTE — BH NOTES
Patient participated in group today he was calm pleasant and co-operative, he was med compliant , staff will continue to monitor patient for safety.

## 2019-10-16 NOTE — BSMART NOTE
Pt. Is a 54year old homeless male with history of  Schizoaffective disorder,  Cocaine use disorder and  Cannabis use disorder . Pt. Was admitted to this facility for having ideations to harm self along with auditory hallucinations.  
  
Pt's case was discussed case was discussed in treatment team. 
 
SW Contact:  SW met with pt to discuss dc planning. Pt. Expressed he feels better today. SW had pt to reflect on treatment goals and safety plan. Pt states medications are helping him. Pt no longer hears voices and denies to harm self. Pt reflected on relapse prevention. Pt. states he plans to attend AA meeting and groups so he can remain sober. Pt. Is interested in exploring sober living house once he finishes [facility] rehab. Pt. continues to be cooperative, mood is starting to improve. and has fair insight. SW will assist the pt  with dc planning.  DC plan pt. Will explore going to crisis stabilization program in [facility]. Pt. Would like to go to [place] [facility] if accepted. Pt.'s interview is scheduled for 10/017/19 @ 3:00 . EVERARDO will continue to assist to the pt with dc planning.

## 2019-10-16 NOTE — BH NOTES
Treatment team met -     Medical Director:                                _____present        Psychiatrist:                                        _x____present      Charge nurse:                                     _x____present           MSW:                                                __x___present      :                      _x____present      Nurse Manager:                                 x _____present      Student RNs:                                      _____present      Medical Students:                               x_____present      Art Therapist:                                      _x____present   Clinical Coordinator:                           _x____present   Internal :                      _x____present        Plan of care discussed and updated as appropriate.

## 2019-10-16 NOTE — PROGRESS NOTES
Behavioral Health Progress Note    Admit Date: 10/5/2019  Hospital day 10    Vitals :   Patient Vitals for the past 8 hrs:   BP Temp Pulse Resp   10/16/19 0755 107/68 98.3 °F (36.8 °C) 83 20     Labs:  No results found for this or any previous visit (from the past 24 hour(s)).   Meds:   Current Facility-Administered Medications   Medication Dose Route Frequency    promethazine (PHENERGAN) injection 25 mg  25 mg IntraMUSCular Q6H PRN    haloperidol (HALDOL) tablet 15 mg  15 mg Oral QHS    traZODone (DESYREL) tablet 200 mg  200 mg Oral QHS    QUEtiapine (SEROquel) tablet 800 mg  800 mg Oral QHS    sertraline (ZOLOFT) tablet 100 mg  100 mg Oral BID    haloperidol lactate (HALDOL) injection 5 mg  5 mg IntraMUSCular Q6H PRN    haloperidol (HALDOL) tablet 5 mg  5 mg Oral Q6H PRN    acetaminophen (TYLENOL) tablet 650 mg  650 mg Oral Q6H PRN    pantoprazole (PROTONIX) tablet 40 mg  40 mg Oral ACB&D    hydrOXYzine pamoate (VISTARIL) capsule 50 mg  50 mg Oral Q4H PRN    topiramate (TOPAMAX) tablet 50 mg  50 mg Oral BID WITH MEALS    influenza vaccine 2019-20 (6 mos+)(PF) (FLUARIX/FLULAVAL/FLUZONE QUAD) injection 0.5 mL  0.5 mL IntraMUSCular PRIOR TO DISCHARGE      Hospital Problems: Principal Problem:    Schizoaffective disorder, depressive type ([facility] (1/4/2017)    Active Problems:    Cocaine use disorder, severe, dependence ([facility] (1/4/2019)        Subjective:   Medication side effects: none  none    Mental Status Exam  Sensorium: alert  Orientation: only aware of  time, place and person  Relations: cooperative  Eye Contact: appropriate  Appearance: shows no evidence of impairment  Thought Process: normal rate of thoughts and fair abstract reasoning/computation   Thought Content: no evidence of impairment   Suicidal: denies   Homicidal: none   Mood: unhappy   Affect: stable  Memory: shows no evidence of impairment     Concentration: fair  Abstraction: concrete  Insight: The patient's insight shows no evidence of impairment    OR Fair  Judgement: is psychologically impaired OR  Fair    Assessment/Plan:   improved  Complains that he gets Protonix at 545 am, actually is about 630 am. He spoke w/ father who is still angry at him for using drugs w/ mother, saying he is an adult and responsible for self. Has some trouble sleeping as lay in bed at night thinking of what mother said that he should kill self and is not worthwhile to anyone because of his sexual preference. Denies halluc or SI. Continue close observation, telephone call tomorrow to be considered for [facility]. Meds as is, supportive care.

## 2019-10-16 NOTE — GROUP NOTE
[facility] GROUP DOCUMENTATION INDIVIDUAL Group Therapy Note Date: October 16 Group Start Time: 6229 Group End Time: 0830 Group Topic: Nursing [address] 1 SPECIAL TRTMT 1 Marsha Gamez RN 
 
[facility] GROUP DOCUMENTATION GROUP Group Therapy Note Attendees:6 Attendance: Did not attend Ramin Delarosa RN

## 2019-10-16 NOTE — BSMART NOTE
OCCUPATIONAL THERAPY PROGRESS NOTE Group Time:  1100 Attendance: The patient attended full group. Autumn Laws Participation: The patient participated with minimal elaboration in the activity. Attention: The patient was able to focus on the activity. Interaction: The patient acknowledges others or responds to questions,  with no spontaneous interaction.

## 2019-10-16 NOTE — PROGRESS NOTES
Problem: Suicide  Goal: *STG: Remains safe in hospital  Description  Pt will not engage in any self injurious behaviors while hospitalized    Outcome: Progressing Towards Goal  Goal: *STG:  Verbalizes alternative ways of dealing with maladaptive feelings/behaviors  Description  Pt will identify 3 positive coping skills to utilize when feelings to harm self arise on daily RN assessment    Outcome: Progressing Towards Goal  Goal: *STG/LTG: No longer expresses self destructive or suicidal thoughts  Description  Pt will deny SI on daily RN assessment   Outcome: Progressing Towards Goal  Patient states he is doing well. Denies SI and A&V hallucinations. No complaints voiced.

## 2019-10-17 PROCEDURE — 65220000003 HC RM SEMIPRIVATE PSYCH

## 2019-10-17 PROCEDURE — 74011250637 HC RX REV CODE- 250/637: Performed by: PSYCHIATRY & NEUROLOGY

## 2019-10-17 RX ADMIN — SERTRALINE HYDROCHLORIDE 100 MG: 50 TABLET ORAL at 08:06

## 2019-10-17 RX ADMIN — PANTOPRAZOLE SODIUM 40 MG: 40 TABLET, DELAYED RELEASE ORAL at 16:20

## 2019-10-17 RX ADMIN — TOPIRAMATE 50 MG: 25 TABLET, FILM COATED ORAL at 16:20

## 2019-10-17 RX ADMIN — TOPIRAMATE 50 MG: 25 TABLET, FILM COATED ORAL at 08:05

## 2019-10-17 RX ADMIN — ACETAMINOPHEN 650 MG: 325 TABLET ORAL at 12:04

## 2019-10-17 RX ADMIN — HALOPERIDOL 15 MG: 10 TABLET ORAL at 20:07

## 2019-10-17 RX ADMIN — SERTRALINE HYDROCHLORIDE 100 MG: 50 TABLET ORAL at 20:07

## 2019-10-17 RX ADMIN — QUETIAPINE FUMARATE 800 MG: 300 TABLET ORAL at 20:06

## 2019-10-17 RX ADMIN — TRAZODONE HYDROCHLORIDE 200 MG: 100 TABLET ORAL at 20:07

## 2019-10-17 RX ADMIN — PANTOPRAZOLE SODIUM 40 MG: 40 TABLET, DELAYED RELEASE ORAL at 06:51

## 2019-10-17 NOTE — BH NOTES
Patient was active during shift. Patient woke up ate breakfast completed group and morning hygiene. Patient took his morning medicine and remained in compliance with all of staff instructions. Patient contracted for safety and stated he does not feel SI or HI. Staff will continue to monitor and  patient throughout the evening.

## 2019-10-17 NOTE — GROUP NOTE
IP  GROUP DOCUMENTATION INDIVIDUAL Group Therapy Note Date: October 17 Group Start Time: 0900 Group End Time: 0930 Group Topic: Nursing [facility] SPECIAL TRTMT 1 Doris Topete, RN 
 
[facility]  GROUP DOCUMENTATION GROUP Group Therapy Note Attendees: 4 Attendance: Did not attend Roula Marcum RN

## 2019-10-17 NOTE — BSMART NOTE
OCCUPATIONAL THERAPY PROGRESS NOTE Group Time:  1430 Attendance: The patient attended full group. Veto Willis Participation: The patient participated with minimal elaboration in the activity. Attention: The patient was able to focus on the activity. Interaction: The patient acknowledges others or responds to questions,  with no spontaneous interaction.

## 2019-10-17 NOTE — GROUP NOTE
IP  GROUP DOCUMENTATION INDIVIDUAL Group Therapy Note Date: October 17 Group Start Time: 1618 Group End Time: 1800 Group Topic: Medication [facility] 1 ADULT CHEM DEP Lakisha Garza RN 
 
[facility] GROUP DOCUMENTATION GROUP Group Therapy Note Attendees: 5 Attendance: Did not attend Additional Notes: Patient was offered to attend the medication group, remained in his room. Staff will continue to offer and encourage groups.  
 
Shante Velasquez RN

## 2019-10-17 NOTE — BH NOTES
During this shift pt has been in and out of the milieu selectively socializing with thomas peers and members of staff. While out in the milieu pt tends to be reserved keeping to himself. Pt generally does not speak unless spoken to but will voice needs to staff if pt has a request. Pt's affect on this shift has been flat but pt is able to laugh and joke with peers on the unit during group(s) held by staff. Pt is compliant to both unit rules and staff commands. Pt did not have a behavioral outburst during this shift and has not required redirection from staff at any time during this shift. Pt denies SI, HI and AVH at this time. Pt states, \"I hope my relationship with my parents gets better. We all just need to forgive each other. \" Pt had no new complaints of pains or any other problems. Will continue rounds monitoring pt behavior, location & safety.

## 2019-10-17 NOTE — BSMART NOTE
Pt. Is a 54year old homeless male with history of  Schizoaffective disorder,  Cocaine use disorder and  Cannabis use disorder . Pt. Was admitted to this facility for having ideations to harm self along with auditory hallucinations.  
  
SW met with pt this a.m to discuss dc planning. Pt expressed he is just waiting for today's interview. SW provide positive words of encouragement. SW encouraged pt to attend groups. SW had pt to reflect on safety plan. Pt. Denies both ideations and hallucinations. .  Pt. continues to be cooperative, mood is starting to improve. and has fair insight.  SW will assist the pt  with dc planning.   
 
Pt's case was discussed case was discussed  With treating psychiatrist.  Melinda Hunter contacted [facility] for the pt. The pt. 's appointment has been rescheduled for 10/18/19 @ 3:00 . SW provided updated notes on the pt. The staff at [facility] informed SW , the facility has male beds available. The pt will be rosita to enter the program on 10/21/19 if accepted after his interview. DC plan pt. Will explore going to crisis stabilization program in [facility]. Until he can go to [facility].

## 2019-10-17 NOTE — PROGRESS NOTES
Problem: Falls - Risk of  Goal: *Absence of Falls  Description  Document T[facility] Fall Risk and appropriate interventions in the flowsheet. Outcome: Progressing Towards Goal  Note:   Fall Risk Interventions:            Medication Interventions: Teach patient to arise slowly                   Problem: Suicide  Goal: *STG: Remains safe in hospital  Description  Pt will not engage in any self injurious behaviors while hospitalized    Outcome: Progressing Towards Goal  Goal: *STG:  Verbalizes alternative ways of dealing with maladaptive feelings/behaviors  Description  Pt will identify 3 positive coping skills to utilize when feelings to harm self arise on daily RN assessment    Outcome: Progressing Towards Goal  Goal: *STG/LTG: No longer expresses self destructive or suicidal thoughts  Description  Pt will deny SI on daily RN assessment    Outcome: Progressing Towards Goal   Patient denies SI/HI , denies A/V hallucinations. Patient has been resting in bed most of the day. Out for meals. Voiced no complaints.

## 2019-10-17 NOTE — BSMART NOTE
COUNSELING GROUP PROGRESS NOTE Group time: 11:00 The patient did not awaken/get up when called for group.

## 2019-10-18 PROCEDURE — 74011250637 HC RX REV CODE- 250/637: Performed by: PSYCHIATRY & NEUROLOGY

## 2019-10-18 PROCEDURE — 65220000003 HC RM SEMIPRIVATE PSYCH

## 2019-10-18 RX ORDER — ACETAMINOPHEN 325 MG/1
975 TABLET ORAL
Status: DISCONTINUED | OUTPATIENT
Start: 2019-10-18 | End: 2019-10-21 | Stop reason: HOSPADM

## 2019-10-18 RX ADMIN — TOPIRAMATE 50 MG: 25 TABLET, FILM COATED ORAL at 08:19

## 2019-10-18 RX ADMIN — SERTRALINE HYDROCHLORIDE 100 MG: 50 TABLET ORAL at 08:18

## 2019-10-18 RX ADMIN — PANTOPRAZOLE SODIUM 40 MG: 40 TABLET, DELAYED RELEASE ORAL at 06:50

## 2019-10-18 RX ADMIN — HALOPERIDOL 15 MG: 10 TABLET ORAL at 20:23

## 2019-10-18 RX ADMIN — TRAZODONE HYDROCHLORIDE 200 MG: 100 TABLET ORAL at 20:24

## 2019-10-18 RX ADMIN — ACETAMINOPHEN 650 MG: 325 TABLET ORAL at 08:18

## 2019-10-18 RX ADMIN — QUETIAPINE FUMARATE 800 MG: 300 TABLET ORAL at 20:23

## 2019-10-18 RX ADMIN — TOPIRAMATE 50 MG: 25 TABLET, FILM COATED ORAL at 16:39

## 2019-10-18 RX ADMIN — PANTOPRAZOLE SODIUM 40 MG: 40 TABLET, DELAYED RELEASE ORAL at 16:39

## 2019-10-18 RX ADMIN — SERTRALINE HYDROCHLORIDE 100 MG: 50 TABLET ORAL at 20:24

## 2019-10-18 NOTE — PROGRESS NOTES
10/18/19 1004   Group Therapy   Group Nursing  (discharge planning)   Attendance Attended   Number of participants 7   Time in 0800   Time out 0815   Total Time 15   Interventions/techniques Informed   Follows directions Followed directions   Interactions Interacted appropriately   Mental Status Calm;Congruent   Behavior/appearance Attentive

## 2019-10-18 NOTE — PROGRESS NOTES
Problem: Falls - Risk of  Goal: *Absence of Falls  Description  Document Lor Raymond Fall Risk and appropriate interventions in the flowsheet. Outcome: Progressing Towards Goal  Note:   Fall Risk Interventions:  Medication Interventions: Teach patient to arise slowly  Pt remains free of falls. Patient has been in his bed for most of the day today. Patient is awaiting a call from rehab and denies current SI/HI and AVH. Patient is calm and cooperative but continues to appear sad.

## 2019-10-18 NOTE — BH NOTES
Eats and tolerates evening meal. Dull flat sad. Quiet reserved. Keeps to self. Attends RN group. Interacts with staff and peers minimally. Gait appropriate. Takes medicines without incident. Gripper socks and 15 minute checks in place for safety. Will continue to monitor and support.

## 2019-10-18 NOTE — PROGRESS NOTES
NUTRITION    Nutrition Screen     RECOMMENDATIONS / PLAN:     - No nutrition interventions indicated at this time. Will re-screen as appropriate. NUTRITION DIAGNOSIS & INTERVENTIONS:     - Meals/snacks: general healthy diet: double portions per MD order    Nutrition Diagnosis: No nutrition diagnosis at this time. ASSESSMENT:     10/18: Good meal intake appetite. Denies N/V/abdominal pain. 10/14: Admitted after presentation to the ED saying he had taken medications in an attempt to harm himself, also experiencing auditory hallucinations. Pt reports good meal intake and appetite until this am when he was experiencing some nausea and emesis this am after breakfast. Received phenergan. BM 10/12 per pt. Nutritional intake adequate to meet patients estimated nutritional needs:  Yes    Diet: DIET REGULAR Double Portions    Food Allergies: NKFA  Current Appetite: Good  Appetite/meal intake prior to admission: Good  Feeding Limitations:  [] Swallowing Difficulty       [] Chewing Difficulty       [] Other   Current Meal Intake: No data found. Gastrointestinal Issues:  [x] Yes: c/o some nausea and vomiting this am, received prn phenergan  Skin Integrity:  WDL  Pertinent Medications:  Reviewed: protonix, phenergan   Labs:  Reviewed     Anthropometrics:  Ht Readings from Last 1 Encounters:   10/17/19 5' 11\" (1.803 m)       Last 3 Recorded Weights in this Encounter    10/17/19 1354   Weight: 89.4 kg (197 lb)       Body mass index is 27.48 kg/m².      Weight History:   Weight Metrics 10/17/2019 9/26/2019 9/24/2019 8/28/2019 4/17/2019 4/15/2019 4/8/2019   Weight 197 lb 220 lb 0.3 oz 220 lb 206 lb 2.1 oz 220 lb 0.3 oz 208 lb 15.9 oz 220 lb   BMI 27.48 kg/m2 29.84 kg/m2 29.84 kg/m2 27.96 kg/m2 29.84 kg/m2 28.34 kg/m2 29.84 kg/m2       Admitting Diagnosis: Schizophrenia ([facility] [F20.9]  Past Medical History:   Diagnosis Date    Adenomatous colon polyp     Anxiety     Arthritis     Asthma     Back pain     CPAP (continuous positive airway pressure) dependence     Depression     Dizziness     Gallbladder disease     GERD (gastroesophageal reflux disease)     Hemorrhoids     Joint pain     Migraine     Neck pain     Pancreatitis     PUD (peptic ulcer disease)     Rectal bleeding     Schizoaffective disorder (HCC)     Sleep apnea     uses c pap        Education Needs:        [x] None identified  [] Identified - Not appropriate at this time  []  Identified and addressed - refer to education log  Learning Limitations:   [x] None identified  [] Identified    Cultural, Anabaptism & ethnic food preferences identified:  [x] None    [] Yes      ESTIMATED NUTRITION NEEDS:     3572-0006 kcal (MSJx1-1.3),  gm protein (0.8-1 gm/kg), 1 mL/kcal  Based on: 100 kg       [x] Actual BW- previous weight documented on 9/26/19         MONITORING & EVALUATION:     Nutrition Goal(s):   - PO nutrition intake will meet >75% of patient estimated nutritional needs within the next 7 days. Outcome: Met/Resolved    Monitor:  [x] Food and beverage intake   [x] Diet order   [x] Nutrition-focused physical findings   [] Weight      Previous Recommendations (for follow-up assessments only): Implemented       Discharge Planning: No nutritional discharge needs at this time.    [x]  Participated in care planning, discharge planning, & interdisciplinary rounds as appropriate      Lexa Early, FIORDALIZA   Pager: [phone]

## 2019-10-18 NOTE — BSMART NOTE
OCCUPATIONAL THERAPY PROGRESS NOTE Group Time:  1430 Attendance: The patient attended 1/2 of group. Participation:. The patient participated with minimal elaboration in the activity. Attention: The patient was able to focus on the activity. Interaction: The patient acknowledges others or responds to questions,  with no spontaneous interaction. Participates when called on only, answers appropriate. Called out.

## 2019-10-18 NOTE — PROGRESS NOTES
Behavioral Health Progress Note    Admit Date: 10/5/2019  LATE ENTRY FOR 10/17/19    Vitals :   Patient Vitals for the past 8 hrs:   BP Temp Pulse Resp   10/18/19 0824 101/69 97.6 °F (36.4 °C) 85 18     Labs:  No results found for this or any previous visit (from the past 24 hour(s)).   Meds:   Current Facility-Administered Medications   Medication Dose Route Frequency    promethazine (PHENERGAN) injection 25 mg  25 mg IntraMUSCular Q6H PRN    haloperidol (HALDOL) tablet 15 mg  15 mg Oral QHS    traZODone (DESYREL) tablet 200 mg  200 mg Oral QHS    QUEtiapine (SEROquel) tablet 800 mg  800 mg Oral QHS    sertraline (ZOLOFT) tablet 100 mg  100 mg Oral BID    haloperidol lactate (HALDOL) injection 5 mg  5 mg IntraMUSCular Q6H PRN    haloperidol (HALDOL) tablet 5 mg  5 mg Oral Q6H PRN    acetaminophen (TYLENOL) tablet 650 mg  650 mg Oral Q6H PRN    pantoprazole (PROTONIX) tablet 40 mg  40 mg Oral ACB&D    hydrOXYzine pamoate (VISTARIL) capsule 50 mg  50 mg Oral Q4H PRN    topiramate (TOPAMAX) tablet 50 mg  50 mg Oral BID WITH MEALS    influenza vaccine 2019-20 (6 mos+)(PF) (FLUARIX/FLULAVAL/FLUZONE QUAD) injection 0.5 mL  0.5 mL IntraMUSCular PRIOR TO DISCHARGE      Hospital Problems: Principal Problem:    Schizoaffective disorder, depressive type ([facility] (1/4/2017)    Active Problems:    Cocaine use disorder, severe, dependence ([facility] (1/4/2019)        Subjective:   Medication side effects: none  HA    Mental Status Exam  Sensorium: alert  Orientation: only aware of  time, place and person  Relations: cooperative  Eye Contact: appropriate  Appearance: shows no evidence of impairment  Thought Process: normal rate of thoughts and fair abstract reasoning/computation   Thought Content: no evidence of impairment   Suicidal: denies   Homicidal: none   Mood: is anxious   Affect: stable  Memory: shows no evidence of impairment     Concentration: fair  Abstraction: concrete  Insight: The patient's insight shows no evidence of impairment    OR Fair  Judgement: is psychologically impaired OR  Fair    Assessment/Plan:   improved  Pt seen and discussed w/ nurse. Asleep all day. Was to have interview w/ [facility] . They called at last minute to say they were too busy and could not do a call. Had to be rescheduled to Friday. Has HA again, does not know what brings them on but not anew issue. Took Tylenol.      Continue close observation, meds as is,

## 2019-10-18 NOTE — BSMART NOTE
ART THERAPY GROUP PROGRESS NOTE PATIENT SCHEDULED FOR GROUP AT: 1000 ATTENDANCE: Full PARTICIPATION LEVEL: Participates fully in the art process. ATTENTION LEVEL: Able to focus on task. FOCUS: Grounding SYMBOLIC & THEMATIC CONTENT AS NOTED IN IMAGERY: He presented with a depressed mood and blunted affect and his thought processes were logical and somewhat limited throughout the group. He was actively engaged in the art therapy directive and asked for help appropriately from the art therapist when clarifying the directive. He was quiet and kept to himself for most of the group only speaking when prompted by art therapist. He was invested in the process and his approach to task was intentional, he did not participate in group reflective discussion other than through listening.

## 2019-10-18 NOTE — BSMART NOTE
Pt. Is a 54year old homeless male with history of  Schizoaffective disorder,  Cocaine use disorder and  Cannabis use disorder . Pt. Was admitted to this facility for having ideations to harm self along with auditory hallucinations.  
  
EVERARDO met with pt this a.m to discuss dc planning. Pt. completed phone assessment at [email or web address]. The program currently solares snot have beds available. The pt was place don the wait list.  Pt. [facility] [place] [address]  Phone  [phone]  Fax [phone] . EVERARDO talked to . [facility] staff coordinator . The staff from  the [facility] will pick pt up on 10/14/19 @ 12:00 . [email or web address] will contact The pt. Pt. Expressed to feeling better. Pt. Denies ideations and hallucinations. SW provided pt with positive feedback. EVERARDO will continue to assist the pt with dc planning.

## 2019-10-19 PROCEDURE — 74011250637 HC RX REV CODE- 250/637: Performed by: PSYCHIATRY & NEUROLOGY

## 2019-10-19 PROCEDURE — 65220000003 HC RM SEMIPRIVATE PSYCH

## 2019-10-19 RX ORDER — FAMOTIDINE 20 MG/1
20 TABLET, FILM COATED ORAL 2 TIMES DAILY
Status: DISCONTINUED | OUTPATIENT
Start: 2019-10-19 | End: 2019-10-21 | Stop reason: HOSPADM

## 2019-10-19 RX ORDER — CALCIUM CARBONATE 200(500)MG
200 TABLET,CHEWABLE ORAL
Status: DISCONTINUED | OUTPATIENT
Start: 2019-10-19 | End: 2019-10-21 | Stop reason: HOSPADM

## 2019-10-19 RX ADMIN — FAMOTIDINE 20 MG: 20 TABLET ORAL at 20:43

## 2019-10-19 RX ADMIN — TRAZODONE HYDROCHLORIDE 200 MG: 100 TABLET ORAL at 20:43

## 2019-10-19 RX ADMIN — CALCIUM CARBONATE (ANTACID) CHEW TAB 500 MG 200 MG: 500 CHEW TAB at 16:59

## 2019-10-19 RX ADMIN — QUETIAPINE FUMARATE 800 MG: 300 TABLET ORAL at 20:43

## 2019-10-19 RX ADMIN — PANTOPRAZOLE SODIUM 40 MG: 40 TABLET, DELAYED RELEASE ORAL at 08:28

## 2019-10-19 RX ADMIN — SERTRALINE HYDROCHLORIDE 100 MG: 50 TABLET ORAL at 08:28

## 2019-10-19 RX ADMIN — SERTRALINE HYDROCHLORIDE 100 MG: 50 TABLET ORAL at 20:43

## 2019-10-19 RX ADMIN — ACETAMINOPHEN 975 MG: 325 TABLET ORAL at 19:07

## 2019-10-19 RX ADMIN — ACETAMINOPHEN 975 MG: 325 TABLET ORAL at 08:28

## 2019-10-19 RX ADMIN — TOPIRAMATE 50 MG: 25 TABLET, FILM COATED ORAL at 16:59

## 2019-10-19 RX ADMIN — TOPIRAMATE 50 MG: 25 TABLET, FILM COATED ORAL at 08:28

## 2019-10-19 RX ADMIN — HALOPERIDOL 15 MG: 10 TABLET ORAL at 20:43

## 2019-10-19 NOTE — PROGRESS NOTES
Behavioral Health Progress Note    Admit Date: 10/5/2019  Hospital day 13    Vitals : No data found. Labs:  No results found for this or any previous visit (from the past 24 hour(s)).   Meds:   Current Facility-Administered Medications   Medication Dose Route Frequency    famotidine (PEPCID) tablet 20 mg  20 mg Oral BID    calcium carbonate (TUMS) chewable tablet 200 mg [elemental]  200 mg Oral TID WITH MEALS    acetaminophen (TYLENOL) tablet 975 mg  975 mg Oral Q6H PRN    promethazine (PHENERGAN) injection 25 mg  25 mg IntraMUSCular Q6H PRN    haloperidol (HALDOL) tablet 15 mg  15 mg Oral QHS    traZODone (DESYREL) tablet 200 mg  200 mg Oral QHS    QUEtiapine (SEROquel) tablet 800 mg  800 mg Oral QHS    sertraline (ZOLOFT) tablet 100 mg  100 mg Oral BID    haloperidol lactate (HALDOL) injection 5 mg  5 mg IntraMUSCular Q6H PRN    haloperidol (HALDOL) tablet 5 mg  5 mg Oral Q6H PRN    hydrOXYzine pamoate (VISTARIL) capsule 50 mg  50 mg Oral Q4H PRN    topiramate (TOPAMAX) tablet 50 mg  50 mg Oral BID WITH MEALS    influenza vaccine 2019-20 (6 mos+)(PF) (FLUARIX/FLULAVAL/FLUZONE QUAD) injection 0.5 mL  0.5 mL IntraMUSCular PRIOR TO DISCHARGE      Hospital Problems: Principal Problem:    Schizoaffective disorder, depressive type ([facility] (1/4/2017)    Active Problems:    Cocaine use disorder, severe, dependence ([facility] (1/4/2019)        Subjective:   Medication side effects: headache  none    Mental Status Exam  Sensorium: alert  Orientation: only aware of  time, place and person  Relations: cooperative  Eye Contact: appropriate  Appearance: shows no evidence of impairment  Thought Process: normal rate of thoughts and fair abstract reasoning/computation   Thought Content: no evidence of impairment   Suicidal: denies   Homicidal: none   Mood: is euthymic   Affect: stable  Memory: shows no evidence of impairment     Concentration: fair  Abstraction: abstract  Insight: The patient's insight shows no evidence of impairment    OR Fair  Judgement: is psychologically impaired OR  Fair    Assessment/Plan:   improved  Pt having HA this am but lessened. Discussed that SSRI can sometimes cause HA but he says this is not unusual and has been on the Zoloft for prolonged time without trouble, does not wish to change it. Complains of heartburn Sx much worse and that Protonix does not do much. Wants to go back on Pepcid and he uses Tums as needed as O-P. Mood is improved. Denies halluc or SI. Says he would need to go to his house before going up to [facility]. He may be able to be DC-ed on evening before entry and father can keep eye on him, then be transported bt [facility] to [facility]. Medications: discontinue: Protonix and begin Pepcid 20 mg bid and Tums  tid  Risks and benefits of medication were discussed. Potential medication side effects were discussed. Patient was given opportunity to ask questions.      Meds as is, supportive care

## 2019-10-19 NOTE — PROGRESS NOTES
Problem: Falls - Risk of  Goal: *Absence of Falls  Description  Document Natalia De León Fall Risk and appropriate interventions in the flowsheet. Outcome: Progressing Towards Goal  Note:   Fall Risk Interventions:            Medication Interventions: Teach patient to arise slowly                   Problem: Suicide  Goal: *STG: Remains safe in hospital  Description  Pt will not engage in any self injurious behaviors while hospitalized    Outcome: Progressing Towards Goal  Goal: *STG:  Verbalizes alternative ways of dealing with maladaptive feelings/behaviors  Description  Pt will identify 3 positive coping skills to utilize when feelings to harm self arise on daily RN assessment    Outcome: Progressing Towards Goal  Goal: *STG/LTG: No longer expresses self destructive or suicidal thoughts  Description  Pt will deny SI on daily RN assessment    Outcome: Progressing Towards Goal  Goal: Interventions  Outcome: Progressing Towards Goal     Problem: Opiate Withdrawal  Goal: *STG: Participates in treatment plan  Description  Pt will actively participate in treatment plan while hospitalized    Outcome: Progressing Towards Goal  Goal: *STG: Seeks staff when symptoms of withdrawal increase  Description  Pt will notify staff at onset of withdrawal symptoms to ensure safe detox while hospitalized    Outcome: Progressing Towards Goal  Goal: *STG: Attends activities and groups  Description  Pt will attend and participate in 3 scheduled groups daily while hospitalized    Outcome: Progressing Towards Goal  Goal: Interventions  Outcome: Progressing Towards Goal     Problem: Altered Thought Process (Adult/Pediatric)  Goal: *STG: Decreased hallucinations  Description  Pt will deny auditory hallucinations on daily RN assessment    Outcome: Progressing Towards Goal  Goal: Interventions  Outcome: Progressing Towards Goal   Pt is quiet and isolative. He is very soft spoken and has very minimal brief responses in one to one.  He appears sad and depressed. Pt denies any thoughts of harming self or others. Pt states he did make his phone call to [facility] and was accepted but does not know when he will go there. Pt is still complaining of a migraine headache that he states won't go away. He is on Lamictal and was medicated with tylenol as ordered.

## 2019-10-19 NOTE — PROGRESS NOTES
Behavioral Health Progress Note    Admit Date: 10/5/2019  Hospital day 12    Vitals :   Patient Vitals for the past 8 hrs:   BP Temp Pulse Resp   10/18/19 1959 104/70 99.1 °F (37.3 °C) 84 18     Labs:  No results found for this or any previous visit (from the past 24 hour(s)).   Meds:   Current Facility-Administered Medications   Medication Dose Route Frequency    acetaminophen (TYLENOL) tablet 975 mg  975 mg Oral Q6H PRN    promethazine (PHENERGAN) injection 25 mg  25 mg IntraMUSCular Q6H PRN    haloperidol (HALDOL) tablet 15 mg  15 mg Oral QHS    traZODone (DESYREL) tablet 200 mg  200 mg Oral QHS    QUEtiapine (SEROquel) tablet 800 mg  800 mg Oral QHS    sertraline (ZOLOFT) tablet 100 mg  100 mg Oral BID    haloperidol lactate (HALDOL) injection 5 mg  5 mg IntraMUSCular Q6H PRN    haloperidol (HALDOL) tablet 5 mg  5 mg Oral Q6H PRN    pantoprazole (PROTONIX) tablet 40 mg  40 mg Oral ACB&D    hydrOXYzine pamoate (VISTARIL) capsule 50 mg  50 mg Oral Q4H PRN    topiramate (TOPAMAX) tablet 50 mg  50 mg Oral BID WITH MEALS    influenza vaccine 2019-20 (6 mos+)(PF) (FLUARIX/FLULAVAL/FLUZONE QUAD) injection 0.5 mL  0.5 mL IntraMUSCular PRIOR TO DISCHARGE      Hospital Problems: Principal Problem:    Schizoaffective disorder, depressive type ([facility] (1/4/2017)    Active Problems:    Cocaine use disorder, severe, dependence ([facility] (1/4/2019)        Subjective:   Medication side effects: HA  none    Mental Status Exam  Sensorium: alert  Orientation: only aware of  time, place and person  Relations: cooperative  Eye Contact: appropriate  Appearance: shows no evidence of impairment  Thought Process: normal rate of thoughts and fair abstract reasoning/computation   Thought Content: no evidence of impairment   Suicidal: none   Homicidal: none   Mood: is euthymic   Affect: stable  Memory: shows no evidence of impairment     Concentration: fair  Abstraction: concrete  Insight: The patient's insight shows no evidence of impairment    OR Fair  Judgement: is psychologically impaired OR  Fair    Assessment/Plan:   improved    Continue close observation, Phone call to rehab facility done. He states that they accepted him but we need info as to when. He is quite pleased, continue meds as is except that he has requested we change the Tylenol to 975 mg prn. <ax dose for him in 24 hours would be 4 gm and he will be under this and not be close most days.

## 2019-10-19 NOTE — PROGRESS NOTES
10/19/19 1101   Group Therapy   Group Nursing;Medication   Attendance Did not attend   Pt complained of a headache that is not going away and wanted to rest. Staff will continue to encourage participation in groups.

## 2019-10-20 PROCEDURE — 74011250637 HC RX REV CODE- 250/637: Performed by: PSYCHIATRY & NEUROLOGY

## 2019-10-20 PROCEDURE — 65220000003 HC RM SEMIPRIVATE PSYCH

## 2019-10-20 RX ADMIN — CALCIUM CARBONATE (ANTACID) CHEW TAB 500 MG 200 MG: 500 CHEW TAB at 08:27

## 2019-10-20 RX ADMIN — CALCIUM CARBONATE (ANTACID) CHEW TAB 500 MG 200 MG: 500 CHEW TAB at 17:08

## 2019-10-20 RX ADMIN — FAMOTIDINE 20 MG: 20 TABLET ORAL at 08:26

## 2019-10-20 RX ADMIN — SERTRALINE HYDROCHLORIDE 100 MG: 50 TABLET ORAL at 08:27

## 2019-10-20 RX ADMIN — CALCIUM CARBONATE (ANTACID) CHEW TAB 500 MG 200 MG: 500 CHEW TAB at 11:26

## 2019-10-20 RX ADMIN — TOPIRAMATE 50 MG: 25 TABLET, FILM COATED ORAL at 17:08

## 2019-10-20 RX ADMIN — TRAZODONE HYDROCHLORIDE 200 MG: 100 TABLET ORAL at 20:22

## 2019-10-20 RX ADMIN — QUETIAPINE FUMARATE 800 MG: 300 TABLET ORAL at 20:23

## 2019-10-20 RX ADMIN — SERTRALINE HYDROCHLORIDE 100 MG: 50 TABLET ORAL at 20:22

## 2019-10-20 RX ADMIN — FAMOTIDINE 20 MG: 20 TABLET ORAL at 20:22

## 2019-10-20 RX ADMIN — TOPIRAMATE 50 MG: 25 TABLET, FILM COATED ORAL at 08:27

## 2019-10-20 RX ADMIN — HALOPERIDOL 15 MG: 10 TABLET ORAL at 20:22

## 2019-10-20 NOTE — BH NOTES
The writer has observed the patient's behavior throughout this shift ([id number]). Patient has been socializing with a minimum with peers during this shift. Patient has been sitting out in the milieu quietly watching television and has attended group session Gayle Valencia) and has eaten, both dinner and snacks. In addition, patient has been pleasant with staff and peers and has not displayed any disruptive behavior issues, and  was compliance with taken scheduled medications. Staff also has been free of all falls and other harms. Staff will continue to monitor the patient's safety and locations.

## 2019-10-20 NOTE — PROGRESS NOTES
Problem: Falls - Risk of  Goal: *Absence of Falls  Description  Document Cong Bean Fall Risk and appropriate interventions in the flowsheet. Outcome: Progressing Towards Goal  Note:   Fall Risk Interventions:            Medication Interventions: Teach patient to arise slowly                   Problem: Suicide  Goal: *STG: Remains safe in hospital  Description  Pt will not engage in any self injurious behaviors while hospitalized    Outcome: Progressing Towards Goal  Goal: *STG:  Verbalizes alternative ways of dealing with maladaptive feelings/behaviors  Description  Pt will identify 3 positive coping skills to utilize when feelings to harm self arise on daily RN assessment    Outcome: Progressing Towards Goal  Goal: *STG/LTG: No longer expresses self destructive or suicidal thoughts  Description  Pt will deny SI on daily RN assessment    Outcome: Progressing Towards Goal  Goal: Interventions  Outcome: Progressing Towards Goal     Problem: Opiate Withdrawal  Goal: *STG: Participates in treatment plan  Description  Pt will actively participate in treatment plan while hospitalized    Outcome: Progressing Towards Goal  Goal: *STG: Seeks staff when symptoms of withdrawal increase  Description  Pt will notify staff at onset of withdrawal symptoms to ensure safe detox while hospitalized    Outcome: Progressing Towards Goal  Goal: *STG: Attends activities and groups  Description  Pt will attend and participate in 3 scheduled groups daily while hospitalized    Outcome: Progressing Towards Goal  Goal: Interventions  Outcome: Progressing Towards Goal     Problem: Altered Thought Process (Adult/Pediatric)  Goal: *STG: Decreased hallucinations  Description  Pt will deny auditory hallucinations on daily RN assessment    Outcome: Progressing Towards Goal  Goal: Interventions  Outcome: Progressing Towards Goal   Pt is pleasant on approach. He denies any thoughts of harming self or others.  Pt is compliant with medications and participates in some groups. He has been accepted to [address] [place] [address] for rehab but doesn't know when he will be going.

## 2019-10-20 NOTE — GROUP NOTE
SARAHI  GROUP DOCUMENTATION INDIVIDUAL Group Therapy Note Date: October 19 Group Start Time: 2045 Group End Time: 2100 Group Topic: Nursing [facility] SPECIAL TRTMT 1 Carline Landaverde, SUSAN 
 
[facility] GROUP DOCUMENTATION GROUP Group Therapy Note Attendees: 4 Attendance: Attended Interventions/techniques: Challenged and Informed Follows Directions: Followed directions Interactions: Interacted appropriately Mental Status: Calm Behavior/appearance: Attentive Goals Achieved: Able to listen to others Catherine Manzano

## 2019-10-20 NOTE — PROGRESS NOTES
10/20/19 1112   Group Therapy   Group Nursing;Process group - inpatient   Attendance Attended   Number of participants 5   Time in 1015   Time out 1030   Total Time 15   Interventions/techniques Informed   Follows directions Followed directions   Interactions Interacted appropriately   Mental Status Blunted   Behavior/appearance Attentive

## 2019-10-20 NOTE — BH NOTES
The writer has observed the patient's behavior throughout this shift ([id number]). Patient has been socializing with peers earlier during this shift, but later isolates himself. Patient has been sitting out in the milieu quietly watching television. Patient continues to have drooling from his mouth, while talking and eating. Patient has attended group session Gayle Valencia) and has eaten, both dinner and snacks. In addition, patient has been pleasant with staff and peers and was compliance with scheduled medications. Staff also has been free of all falls and other harms. Staff will continue to monitor the patient's safety and locations.

## 2019-10-21 VITALS
HEART RATE: 78 BPM | BODY MASS INDEX: 27.58 KG/M2 | SYSTOLIC BLOOD PRESSURE: 113 MMHG | RESPIRATION RATE: 22 BRPM | OXYGEN SATURATION: 99 % | DIASTOLIC BLOOD PRESSURE: 71 MMHG | WEIGHT: 197 LBS | HEIGHT: 71 IN | TEMPERATURE: 97.2 F

## 2019-10-21 PROCEDURE — 74011250637 HC RX REV CODE- 250/637: Performed by: PSYCHIATRY & NEUROLOGY

## 2019-10-21 PROCEDURE — 90471 IMMUNIZATION ADMIN: CPT

## 2019-10-21 PROCEDURE — 3E02340 INTRODUCTION OF INFLUENZA VACCINE INTO MUSCLE, PERCUTANEOUS APPROACH: ICD-10-PCS | Performed by: PSYCHIATRY & NEUROLOGY

## 2019-10-21 PROCEDURE — 74011250636 HC RX REV CODE- 250/636: Performed by: PSYCHIATRY & NEUROLOGY

## 2019-10-21 PROCEDURE — 90686 IIV4 VACC NO PRSV 0.5 ML IM: CPT | Performed by: PSYCHIATRY & NEUROLOGY

## 2019-10-21 RX ORDER — HALOPERIDOL 5 MG/1
15 TABLET ORAL
Qty: 90 TAB | Refills: 0 | Status: SHIPPED | OUTPATIENT
Start: 2019-10-21 | End: 2019-11-20

## 2019-10-21 RX ORDER — TRAZODONE HYDROCHLORIDE 100 MG/1
200 TABLET ORAL
Qty: 60 TAB | Refills: 0 | Status: SHIPPED | OUTPATIENT
Start: 2019-10-21 | End: 2019-11-20

## 2019-10-21 RX ORDER — SERTRALINE HYDROCHLORIDE 100 MG/1
100 TABLET, FILM COATED ORAL 2 TIMES DAILY
Qty: 60 TAB | Refills: 0 | Status: SHIPPED | OUTPATIENT
Start: 2019-10-21

## 2019-10-21 RX ORDER — QUETIAPINE FUMARATE 400 MG/1
800 TABLET, FILM COATED ORAL
Qty: 60 TAB | Refills: 0 | Status: SHIPPED | OUTPATIENT
Start: 2019-10-21

## 2019-10-21 RX ORDER — FAMOTIDINE 20 MG/1
20 TABLET, FILM COATED ORAL 2 TIMES DAILY
Qty: 60 TAB | Refills: 0 | Status: SHIPPED | OUTPATIENT
Start: 2019-10-21 | End: 2020-02-24

## 2019-10-21 RX ORDER — TOPIRAMATE 50 MG/1
50 TABLET, FILM COATED ORAL 2 TIMES DAILY WITH MEALS
Qty: 60 TAB | Refills: 0 | Status: SHIPPED | OUTPATIENT
Start: 2019-10-21

## 2019-10-21 RX ADMIN — INFLUENZA VIRUS VACCINE 0.5 ML: 15; 15; 15; 15 SUSPENSION INTRAMUSCULAR at 11:04

## 2019-10-21 RX ADMIN — CALCIUM CARBONATE (ANTACID) CHEW TAB 500 MG 200 MG: 500 CHEW TAB at 11:22

## 2019-10-21 RX ADMIN — SERTRALINE HYDROCHLORIDE 100 MG: 50 TABLET ORAL at 08:31

## 2019-10-21 RX ADMIN — CALCIUM CARBONATE (ANTACID) CHEW TAB 500 MG 200 MG: 500 CHEW TAB at 08:31

## 2019-10-21 RX ADMIN — FAMOTIDINE 20 MG: 20 TABLET ORAL at 08:31

## 2019-10-21 RX ADMIN — TOPIRAMATE 50 MG: 25 TABLET, FILM COATED ORAL at 08:31

## 2019-10-21 NOTE — GROUP NOTE
SARAHI  GROUP DOCUMENTATION INDIVIDUAL Group Therapy Note Date: October 21 Group Start Time: 1020 Group End Time: 1200 Group Topic: Nursing [facility] SPECIAL TRTMT 1 Johana Coello RN 
 
[facility] GROUP DOCUMENTATION GROUP Group Therapy Note Attendees: 2 Attendance: Attended Interventions/techniques: Informed, Validated, Promoted peer support and Supported Follows Directions: Followed directions Interactions: Interacted appropriately Mental Status: Calm and Flat Behavior/appearance: Attentive and Cooperative Goals Achieved: Able to engage in interactions, Able to listen to others, Able to manage/cope with feelings, Able to receive feedback and Discussed coping Renetta Colón RN

## 2019-10-21 NOTE — BH NOTES
Discharged patient to [place] [address] staff with all personal belongings and discharge instructions. Patient acknowledged all instructions. 130.9

## 2019-10-21 NOTE — BH NOTES
Pt presents euthymic and denies si/hi and avh. Pt has been pleasant and cooperative. Pt observed interacting with peers and staff appropriately. Will continue to support towards tx compliance.

## 2019-10-21 NOTE — BSMART NOTE
Pt. Is a 54year old homeless male with history of  Schizoaffective disorder,  Cocaine use disorder and  Cannabis use disorder . Pt. Was admitted to this facility for having ideations to harm self along with auditory hallucinations. Pt. 's case was discussed. Pt will be dc today. Pt. Will be dc to [facility] today in [place] [address]  Phone  [phone]  Fax [phone] . The staff from  the [place] [address] will pick pt up on 10/14/19 @ 12:00 . [address] [place] [address] 28 day [facility]  will contact The pt. When a bed becomes available. Pt. Denies ideations and hallucinations.

## 2019-10-21 NOTE — DISCHARGE SUMMARY
[facility]    Name:  Janae Vee  MR#:   [id number]  :  1970  ACCOUNT #:  [de-identified]  ADMIT DATE:  10/05/2019  DISCHARGE DATE:  10/21/2019    IDENTIFYING DATA:  The patient presented as a 73-year-old single black male who lives with his father in [place], [place]. He was admitted after presentation to emergency room saying he had taken six Seroquel 400 mg pills in the morning in an attempt to harm himself. He had been noncompliant with his prescribed Seroquel for four or five days. He was followed by psychiatrist Dr. June Dutta. He said brother killed himself in August with cocaine use and suicide. The patient had gone to visit his mother about five days earlier, and she convinced the patient to use his disability check to buy cocaine for both of them. When he ran out of money, she got quite angry, saying he needed to go buy more on credit and began calling him a variety of foul names because he was bisexual.  She told him that he should just go kill himself. He was quite upset that he had used up his rent money for them. He was supposed to give the money to father to have a place to stay, and he was not certain father would let him come back, which he did not. He was depressed with suicidal ideas. He heard brother's voice taunting him and was seeing visions of his dead brother. He was supposed to be on Zoloft 100 mg b.i.d., Seroquel 800 mg at bedtime, Haldol 10 mg twice a day, Nexium 40 mg twice a day, Topamax 50 mg three times a day for migraine headaches. He had his trazodone increased up to 200 mg at bedtime and was not having trouble with this. Unfortunately, the patient could not be stabilized on a single antipsychotic and had required two of them to stabilize. He had five prior admissions to [facility] and one to this facility. He had been admitted to the [facility] Substance Abuse Program many years ago. He had gone to  and , but did not have a sponsor or home group. LABORATORY TESTING:  The patient had a mildly decreased WBC of 3400 per microliter, anemia with a hemoglobin of 10.7 g/dL with normal platelet count. Urinalysis showed a concentrated specimen with specific gravity 1.025, trace protein, trace ketones, only 1-3 wbc's. Comprehensive metabolic panel showed normal sodium, but mild elevation of chloride 116 mmol/L with the remainder normal including normal liver function tests. Acetaminophen level was 0, and salicylate level was subtherapeutic at 1.9 mg/dL. Urine drug screen was positive for barbiturates, cocaine and cannabis. Alcohol level was 0. EKG showed normal sinus rhythm with no significant changes from prior EKG and was normal.    Physical examination in the hospital was normal.    HOSPITAL COURSE:  The patient was admitted to the [facility] special treatment unit where he was afforded individual, group and milieu therapies. Vital signs had normalized with most recent pulse 78, blood pressure 113/71. In the hospital, he was treated with acetaminophen as needed for migraine headache pains, which was effective; calcium carbonate 200 mg three times a day with meals; Pepcid 20 mg b.i.d.; haloperidol 15 mg at bedtime; quetiapine initially tried at lower dose of 600 mg but was necessary to go back up to 800 mg at bedtime; Zoloft 100 mg twice a day; Topamax 50 mg twice a day to decrease migraine headaches; trazodone 200 mg at bedtime. Again, we had attempted to get by with using only a single antipsychotic, but he was continuing to have auditory hallucinations and paranoia, and it was necessary to use two different antipsychotics to stabilize as had been the case in the past.  He was denying homicidal or suicidal ideas, hallucinations or delusions at this point. He did wish to return to a substance abuse program so that he could try to maintain his sobriety.   He was accepted back to the [facility].    CONDITION ON DISCHARGE:  Fair. PROGNOSIS:  Fair. PROCEDURES:  None. ASSESSMENT:  AXIS I:  Schizoaffective disorder, depressed type. Cocaine use disorder, severe. Cannabis use disorder, moderate. Nicotine use disorder, mild. AXIS II:  None. AXIS III:  Status post intentional quetiapine overdose. Gastroesophageal reflux disease. Migraine headaches. DISPOSITION:  Discharged to self. He will be going to the [facility] to await the open bed at the [facility] in [place], [place], for residential substance abuse treatment. He will be following up with Dr. Banerjee when he gets out of the program there. MEDICATIONS:  1. Tums 200 mg three times a day with meals. 2.  Pepcid 20 mg twice a day. 3.  Haloperidol 5 mg tablet three at bedtime, #90, no refill. 4.  Quetiapine 400 mg tablet two at bedtime, #60, no refill. 5.  Sertraline 100 mg tablet one b.i.d., #60, no refill. 6.  Topiramate 50 mg tablet one b.i.d., #60, no refill. 7.  Trazodone 100 mg tablet two at bedtime, #60, no refill.       Lakisha Natarajan MD      [id number]  D:  10/21/2019 10:40  T:  10/21/2019 17:44  JOB #:  [id number]

## 2019-10-21 NOTE — GROUP NOTE
SARAHI  GROUP DOCUMENTATION INDIVIDUAL Group Therapy Note Date: October 20 Group Start Time: 2045 Group End Time: 2100 Group Topic: Nursing [facility] 1 SPECIAL TRT 1 Karolyn MCCLAIN  GROUP DOCUMENTATION GROUP Group Therapy Note Attendees:6 Attendance: Attended Interventions/techniques: Informed Follows Directions: Followed directions Interactions: Interacted appropriately Mental Status: Calm Behavior/appearance: Attentive Goals Achieved: Able to listen to others Tee Acuna

## 2019-10-21 NOTE — DISCHARGE INSTRUCTIONS
Patient armband removed and shredded      BEHAVIORAL HEALTH NURSING DISCHARGE NOTE      The following personal items collected during your admission are returned to you:   Dental Appliance: Dental Appliances: None  Vision: Visual Aid: None  Hearing Aid:    Jewelry: Jewelry: (Watch)  Clothing: Clothing: (2 Jeans, 4 Shirts, Shorts, 1 Belt, 2 Pairs Of Sneakers)  Other Valuables: Other Valuables: (Cell Crystal Alva (3))  Valuables sent to safe: Personal Items Sent to Safe: None      PATIENT INSTRUCTIONS:    Important numbers to remember given to patient  The discharge information has been reviewed with the patient. The patient verbalized understanding.

## 2020-04-26 ENCOUNTER — HOSPITAL ENCOUNTER (EMERGENCY)
Age: 50
Discharge: HOME | End: 2020-04-26
Attending: EMERGENCY MEDICINE
Payer: MEDICARE

## 2020-04-26 VITALS
DIASTOLIC BLOOD PRESSURE: 69 MMHG | WEIGHT: 210 LBS | HEIGHT: 72 IN | TEMPERATURE: 98.6 F | OXYGEN SATURATION: 99 % | HEART RATE: 65 BPM | SYSTOLIC BLOOD PRESSURE: 115 MMHG | BODY MASS INDEX: 28.44 KG/M2 | RESPIRATION RATE: 18 BRPM

## 2020-04-26 DIAGNOSIS — G43.909 MIGRAINE WITHOUT STATUS MIGRAINOSUS, NOT INTRACTABLE, UNSPECIFIED MIGRAINE TYPE: Primary | ICD-10-CM

## 2020-04-26 PROCEDURE — 74011000250 HC RX REV CODE- 250: Performed by: STUDENT IN AN ORGANIZED HEALTH CARE EDUCATION/TRAINING PROGRAM

## 2020-04-26 PROCEDURE — 96372 THER/PROPH/DIAG INJ SC/IM: CPT

## 2020-04-26 PROCEDURE — 74011250636 HC RX REV CODE- 250/636: Performed by: EMERGENCY MEDICINE

## 2020-04-26 PROCEDURE — 96375 TX/PRO/DX INJ NEW DRUG ADDON: CPT

## 2020-04-26 PROCEDURE — 74011636637 HC RX REV CODE- 636/637: Performed by: EMERGENCY MEDICINE

## 2020-04-26 PROCEDURE — 96361 HYDRATE IV INFUSION ADD-ON: CPT

## 2020-04-26 PROCEDURE — 96374 THER/PROPH/DIAG INJ IV PUSH: CPT

## 2020-04-26 PROCEDURE — 99285 EMERGENCY DEPT VISIT HI MDM: CPT

## 2020-04-26 PROCEDURE — 74011250636 HC RX REV CODE- 250/636: Performed by: STUDENT IN AN ORGANIZED HEALTH CARE EDUCATION/TRAINING PROGRAM

## 2020-04-26 PROCEDURE — 74011250637 HC RX REV CODE- 250/637: Performed by: EMERGENCY MEDICINE

## 2020-04-26 PROCEDURE — C9113 INJ PANTOPRAZOLE SODIUM, VIA: HCPCS | Performed by: STUDENT IN AN ORGANIZED HEALTH CARE EDUCATION/TRAINING PROGRAM

## 2020-04-26 PROCEDURE — 74011636637 HC RX REV CODE- 636/637: Performed by: STUDENT IN AN ORGANIZED HEALTH CARE EDUCATION/TRAINING PROGRAM

## 2020-04-26 RX ORDER — PROCHLORPERAZINE EDISYLATE 5 MG/ML
10 INJECTION INTRAMUSCULAR; INTRAVENOUS ONCE
Status: COMPLETED | OUTPATIENT
Start: 2020-04-26 | End: 2020-04-26

## 2020-04-26 RX ORDER — DIPHENHYDRAMINE HYDROCHLORIDE 50 MG/ML
12.5 INJECTION, SOLUTION INTRAMUSCULAR; INTRAVENOUS
Status: COMPLETED | OUTPATIENT
Start: 2020-04-26 | End: 2020-04-26

## 2020-04-26 RX ORDER — DEXAMETHASONE SODIUM PHOSPHATE 4 MG/ML
4 INJECTION, SOLUTION INTRA-ARTICULAR; INTRALESIONAL; INTRAMUSCULAR; INTRAVENOUS; SOFT TISSUE
Status: COMPLETED | OUTPATIENT
Start: 2020-04-26 | End: 2020-04-26

## 2020-04-26 RX ORDER — ACETAMINOPHEN 500 MG
1000 TABLET ORAL
Status: DISCONTINUED | OUTPATIENT
Start: 2020-04-26 | End: 2020-04-26

## 2020-04-26 RX ORDER — KETOROLAC TROMETHAMINE 15 MG/ML
15 INJECTION, SOLUTION INTRAMUSCULAR; INTRAVENOUS
Status: COMPLETED | OUTPATIENT
Start: 2020-04-26 | End: 2020-04-26

## 2020-04-26 RX ORDER — ACETAMINOPHEN 500 MG
500 TABLET ORAL
Status: COMPLETED | OUTPATIENT
Start: 2020-04-26 | End: 2020-04-26

## 2020-04-26 RX ORDER — SUMATRIPTAN 6 MG/.5ML
6 INJECTION, SOLUTION SUBCUTANEOUS ONCE
Status: COMPLETED | OUTPATIENT
Start: 2020-04-26 | End: 2020-04-26

## 2020-04-26 RX ORDER — BUTALBITAL, ACETAMINOPHEN AND CAFFEINE 300; 40; 50 MG/1; MG/1; MG/1
1 CAPSULE ORAL
Qty: 60 CAP | Refills: 0 | Status: ON HOLD | OUTPATIENT
Start: 2020-04-26 | End: 2021-03-29

## 2020-04-26 RX ORDER — SUMATRIPTAN 25 MG/1
50 TABLET, FILM COATED ORAL
Qty: 30 TAB | Refills: 0 | Status: SHIPPED | OUTPATIENT
Start: 2020-04-26 | End: 2021-02-19

## 2020-04-26 RX ADMIN — SODIUM CHLORIDE 40 MG: 9 INJECTION, SOLUTION INTRAMUSCULAR; INTRAVENOUS; SUBCUTANEOUS at 04:43

## 2020-04-26 RX ADMIN — KETOROLAC TROMETHAMINE 15 MG: 15 INJECTION, SOLUTION INTRAMUSCULAR; INTRAVENOUS at 02:29

## 2020-04-26 RX ADMIN — ACETAMINOPHEN, ASPIRIN AND CAFFEINE 2 TABLET: 250; 250; 65 TABLET, FILM COATED ORAL at 03:02

## 2020-04-26 RX ADMIN — ACETAMINOPHEN 500 MG: 500 TABLET ORAL at 02:28

## 2020-04-26 RX ADMIN — SODIUM CHLORIDE 1000 ML: 900 INJECTION, SOLUTION INTRAVENOUS at 02:41

## 2020-04-26 RX ADMIN — PROCHLORPERAZINE EDISYLATE 10 MG: 5 INJECTION INTRAMUSCULAR; INTRAVENOUS at 02:34

## 2020-04-26 RX ADMIN — DEXAMETHASONE SODIUM PHOSPHATE 4 MG: 4 INJECTION, SOLUTION INTRAMUSCULAR; INTRAVENOUS at 02:33

## 2020-04-26 RX ADMIN — DIPHENHYDRAMINE HYDROCHLORIDE 12.5 MG: 50 INJECTION INTRAMUSCULAR; INTRAVENOUS at 02:31

## 2020-04-26 RX ADMIN — SUMATRIPTAN 6 MG: 6 INJECTION SUBCUTANEOUS at 02:37

## 2020-04-26 RX ADMIN — SUMATRIPTAN SUCCINATE 6 MG: 6 INJECTION SUBCUTANEOUS at 07:01

## 2020-04-26 NOTE — DISCHARGE INSTRUCTIONS
Patient Education        Migraine Headache: Care Instructions  Your Care Instructions  Migraines are painful, throbbing headaches that often start on one side of the head. They may cause nausea and vomiting and make you sensitive to light, sound, or smell. Without treatment, migraines can last from 4 hours to a few days. Medicines can help prevent migraines or stop them after they have started. Your doctor can help you find which ones work best for you. Follow-up care is a key part of your treatment and safety. Be sure to make and go to all appointments, and call your doctor if you are having problems. It's also a good idea to know your test results and keep a list of the medicines you take. How can you care for yourself at home? · Do not drive if you have taken a prescription pain medicine. · Rest in a quiet, dark room until your headache is gone. Close your eyes, and try to relax or go to sleep. Don't watch TV or read. · Put a cold, moist cloth or cold pack on the painful area for 10 to 20 minutes at a time. Put a thin cloth between the cold pack and your skin. · Use a warm, moist towel or a heating pad set on low to relax tight shoulder and neck muscles. · Have someone gently massage your neck and shoulders. · Take your medicines exactly as prescribed. Call your doctor if you think you are having a problem with your medicine. You will get more details on the specific medicines your doctor prescribes. · Be careful not to take pain medicine more often than the instructions allow. You could get worse or more frequent headaches when the medicine wears off. To prevent migraines  · Keep a headache diary so you can figure out what triggers your headaches. Avoiding triggers may help you prevent headaches. Record when each headache began, how long it lasted, and what the pain was like.  (Was it throbbing, aching, stabbing, or dull?) Write down any other symptoms you had with the headache, such as nausea, flashing lights or dark spots, or sensitivity to bright light or loud noise. Note if the headache occurred near your period. List anything that might have triggered the headache. Triggers may include certain foods (chocolate, cheese, wine) or odors, smoke, bright light, stress, or lack of sleep. · If your doctor has prescribed medicine for your migraines, take it as directed. You may have medicine that you take only when you get a migraine and medicine that you take all the time to help prevent migraines. ? If your doctor has prescribed medicine for when you get a headache, take it at the first sign of a migraine, unless your doctor has given you other instructions. ? If your doctor has prescribed medicine to prevent migraines, take it exactly as prescribed. Call your doctor if you think you are having a problem with your medicine. · Find healthy ways to deal with stress. Migraines are most common during or right after stressful times. Take time to relax before and after you do something that has caused a migraine in the past.  · Try to keep your muscles relaxed by keeping good posture. Check your jaw, face, neck, and shoulder muscles for tension. Try to relax them. When you sit at a desk, change positions often. And make sure to stretch for 30 seconds each hour. · Get plenty of sleep and exercise. · Eat meals on a regular schedule. Avoid foods and drinks that often trigger migraines. These include chocolate, alcohol (especially red wine and port), aspartame, monosodium glutamate (MSG), and some additives found in foods (such as hot dogs, beltran, cold cuts, aged cheeses, and pickled foods). · Limit caffeine. Don't drink too much coffee, tea, or soda. But don't quit caffeine suddenly. That can also give you migraines. · Do not smoke or allow others to smoke around you. If you need help quitting, talk to your doctor about stop-smoking programs and medicines.  These can increase your chances of quitting for good.  · If you are taking birth control pills or hormone therapy, talk to your doctor about whether they are triggering your migraines. When should you call for help? Call 911 anytime you think you may need emergency care. For example, call if:    · You have signs of a stroke. These may include:  ? Sudden numbness, paralysis, or weakness in your face, arm, or leg, especially on only one side of your body. ? Sudden vision changes. ? Sudden trouble speaking. ? Sudden confusion or trouble understanding simple statements. ? Sudden problems with walking or balance. ? A sudden, severe headache that is different from past headaches.    Call your doctor now or seek immediate medical care if:    · You have new or worse nausea and vomiting.     · You have a new or higher fever.     · Your headache gets much worse.    Watch closely for changes in your health, and be sure to contact your doctor if:    · You are not getting better after 2 days (48 hours). Where can you learn more? Go to http://colton-ralph.info/  Enter Z866 in the search box to learn more about \"Migraine Headache: Care Instructions. \"  Current as of: November 19, 2019Content Version: 12.4  © 1079-7701 Healthwise, Incorporated. Care instructions adapted under license by [employer] (which disclaims liability or warranty for this information). If you have questions about a medical condition or this instruction, always ask your healthcare professional. [address] any warranty or liability for your use of this information.

## 2020-04-26 NOTE — ED PROVIDER NOTES
EMERGENCY DEPARTMENT HISTORY AND PHYSICAL EXAM    1:45 AM      Date: 4/26/2020  Patient Name: Marva Perera    History of Presenting Illness     Chief Complaint   Patient presents with    Headache     History Provided By: Patient  Location/Duration/Severity/Modifying factors   Patient coming in severe frontal headache since this morning with no modifying factors and with associated nausea and vomiting. He reports a history of similar migraines and said this one is similar in quality but worse than his previous migraines. He reports waking up with this gradually worsening frontal headache that is throbbing in quality and associated with nausea/vomiting and sensitivity to light. He denies fever or chills. He denies any acute event or trauma leading up to it. He denies cocaine use and endorses MJ use 2 days ago. PCP: Maira Cheek. Franklin Kurtz MD    Current Facility-Administered Medications   Medication Dose Route Frequency Provider Last Rate Last Dose    sodium chloride 0.9 % bolus infusion 1,000 mL  1,000 mL IntraVENous ONCE Priyanka Alegre MD        prochlorperazine (COMPAZINE) injection 10 mg  10 mg IntraVENous ONCE Priyanka Alegre MD        diphenhydrAMINE (BENADRYL) injection 12.5 mg  12.5 mg IntraVENous NOW Priyanka Alegre MD        SUMAtriptan (IMITREX) injection 6 mg  6 mg SubCUTAneous ONCE Conner Plascencia MD        ketorolac (TORADOL) injection 15 mg  15 mg IntraMUSCular NOW Conner Plascencia MD        dexamethasone (DECADRON) 4 mg/mL injection 4 mg  4 mg IntraVENous NOW Conner Plascencia MD        aspirin-acetaminophen-caffeine [facility]) per tablet 2 Tab  2 Tab Oral ONCE Conner Plascencia MD        acetaminophen (TYLENOL) tablet 500 mg  500 mg Oral NOW Conner Plascencia MD         Current Outpatient Medications   Medication Sig Dispense Refill    traZODone (DESYREL) 100 mg tablet Take 200 mg by mouth nightly.       esomeprazole (NEXIUM) 40 mg capsule Take 40 mg by mouth two (2) times a day.      QUEtiapine (SEROQUEL) 400 mg tablet Take 2 Tabs by mouth nightly. Indications: Schizoaffective Dis 60 Tab 0    sertraline (ZOLOFT) 100 mg tablet Take 1 Tab by mouth two (2) times a day. Indications: Anxiousness associated with Depression 60 Tab 0    topiramate (TOPAMAX) 50 mg tablet Take 1 Tab by mouth two (2) times daily (with meals). Indications: a type of seizure called tonic-clonic epilepsy 60 Tab 0       Past History     Past Medical History:  Past Medical History:   Diagnosis Date    Adenomatous colon polyp     Anxiety     Arthritis     Asthma     Back pain     CPAP (continuous positive airway pressure) dependence     Depression     Dizziness     Gallbladder disease     GERD (gastroesophageal reflux disease)     Hemorrhoids     Joint pain     Migraine     Neck pain     Pancreatitis     PUD (peptic ulcer disease)     Rectal bleeding     Schizoaffective disorder (HCC)     Sleep apnea     uses c pap       Past Surgical History:  Past Surgical History:   Procedure Laterality Date    EGD  10/25/2016         FLEXIBLE SIGMOIDOSCOPY N/A 9/27/2016    SIGMOIDOSCOPY FLEXIBLE performed by Norma Saldivar MD at [place] [id number] N/A 6/19/2018    SIGMOIDOSCOPY FLEXIBLE performed by Amauri Foley MD at [facility] ENDOSCOPY    HX GI      Gall Bladder       Family History:  Family History   Problem Relation Age of Onset    Cancer Mother     Psychiatric Disorder Mother     Hypertension Father     Psychiatric Disorder Maternal Aunt        Social History:  Social History     Tobacco Use    Smoking status: Current Every Day Smoker     Packs/day: 0.25     Years: 15.00     Pack years: 3.75    Smokeless tobacco: Never Used   Substance Use Topics    Alcohol use: Not Currently    Drug use: Not Currently     Types: Cocaine, Marijuana     Comment: Crack- states relapsed last night after 6 months of being clean.         Allergies:  No Known Allergies      Review of Systems     Review of Systems   Constitutional: Negative for chills and fever. Eyes: Positive for photophobia. Negative for itching. Respiratory: Negative for cough and shortness of breath. Cardiovascular: Negative for chest pain and palpitations. Gastrointestinal: Positive for nausea and vomiting. Negative for diarrhea. Genitourinary: Negative for dysuria and urgency. Musculoskeletal: Negative for neck pain and neck stiffness. Neurological:        No focal weakness, no trouble speaking, no loss of consciousness          Physical Exam     Visit Vitals  /73 (BP 1 Location: Left arm, BP Patient Position: At rest)   Pulse 65   Temp 98.6 °F (37 °C)   Resp 18   Ht 6' (1.829 m)   Wt 95.3 kg (210 lb)   SpO2 100%   BMI 28.48 kg/m²       Physical Exam  Constitutional:       Comments: Curled up in dark room, in some distress   HENT:      Head: Normocephalic and atraumatic. Eyes:      Extraocular Movements: Extraocular movements intact. Pupils: Pupils are equal, round, and reactive to light. Neck:      Musculoskeletal: No neck rigidity or muscular tenderness. Cardiovascular:      Rate and Rhythm: Normal rate and regular rhythm. Pulmonary:      Effort: No respiratory distress. Breath sounds: Normal breath sounds. Abdominal:      General: There is no distension. Tenderness: There is no abdominal tenderness. Neurological:      General: No focal deficit present. Cranial Nerves: No cranial nerve deficit. Motor: No weakness. Diagnostic Study Results     Labs -  No results found for this or any previous visit (from the past 12 hour(s)). Radiologic Studies -   No orders to display     Medical Decision Making   I am the first provider for this patient. I reviewed the vital signs, available nursing notes, past medical history, past surgical history, family history and social history. Vital Signs-Reviewed the patient's vital signs.     Records Reviewed: Nursing Notes, Old Medical Records and Previous Laboratory Studies (Time of Review: 1:45 AM)    ED Course: Progress Notes, Reevaluation, and Consults:  Pt treated with tylenol, excedrin, dexamethasone, benadryl, toradol, compazine, fluids, and imitrex  Reassessed pt ~0330 and he was at 8/10 pain, gave pantoprazole for some residual acid reflux with burning in throat and stomach. Reassessed at 0530 pt now at 7/10 looking a lot more comfortable and with no GERD symptoms         Provider Notes (Medical Decision Making):   MDM  Number of Diagnoses or Management Options  Migraine without status migrainosus, not intractable, unspecified migraine type:   Diagnosis management comments: Patient with headache consistent with his previous migraines. No fevers, no focal neurologic findings, gradual onset. No recent cocaine use and last MJ use was 2 days ago. There is some nausea and vomiting but no vomiting here at this point. Pt treated with Tylenol, excedrin, dexamethasone, benadryl, toradol, compazine, fluids and Imitrex. Instructed to follow up with primary care for chronic management and RTC if symptoms worsen. Will DC with sumatriptan and fioricet prescriptions PRN. Patient stable for discharge and in agreement with plan. Ddx includes  Intracerebral hemorrhage less likely given consistent symptoms as prior migraines and gradual onset  CVA less likely given no focal deficits and gradual onset  Meningitis less likely with no fevers or neck symptoms  Encephalitis less likely with no AMS  Concussion less likely with no AMS and no history of trauma      Procedures    Critical Care Time:     Diagnosis     Clinical Impression: No diagnosis found. Disposition: Home with follow up    Follow-up Information     Follow up With Specialties Details Why Contact Info    Mike Bowen.  Bard Doris MD Family Practice Schedule an appointment as soon as possible for a visit for chronic headache management [facility] [address] [facility]      [facility] EMERGENCY DEPT Emergency Medicine  If symptoms worsen [address] [place] [address] [id number] [phone]           Patient's Medications   Start Taking    No medications on file   Continue Taking    ESOMEPRAZOLE (NEXIUM) 40 MG CAPSULE    Take 40 mg by mouth two (2) times a day. QUETIAPINE (SEROQUEL) 400 MG TABLET    Take 2 Tabs by mouth nightly. Indications: Schizoaffective Dis    SERTRALINE (ZOLOFT) 100 MG TABLET    Take 1 Tab by mouth two (2) times a day. Indications: Anxiousness associated with Depression    TOPIRAMATE (TOPAMAX) 50 MG TABLET    Take 1 Tab by mouth two (2) times daily (with meals). Indications: a type of seizure called tonic-clonic epilepsy    TRAZODONE (DESYREL) 100 MG TABLET    Take 200 mg by mouth nightly.    These Medications have changed    No medications on file   Stop Taking    No medications on file     Bertha Benton MD, PGY1  [address]  [facility] Emergency Department

## 2020-04-26 NOTE — ED TRIAGE NOTES
Patient presents to the ED via EMS from home for evaluation of an ongoing migraine. Patient states, \"I have a history of migraines. It started this morning and has progressively gotten worse throughout the day. \"    Per medic, \"Family pulled us aside and informed us that he has schizophrenia and is know to flip and become aggressive. For us he has been very cooperative. \"

## 2022-03-18 PROBLEM — F20.9 SCHIZOPHRENIA (HCC): Status: ACTIVE | Noted: 2019-10-06

## 2022-03-18 PROBLEM — F14.20 COCAINE USE DISORDER, SEVERE, DEPENDENCE (HCC): Status: ACTIVE | Noted: 2019-01-04

## 2022-03-19 PROBLEM — F25.1 SCHIZOAFFECTIVE DISORDER, DEPRESSIVE TYPE (HCC): Status: ACTIVE | Noted: 2017-01-04

## 2022-03-19 PROBLEM — R45.851 SUICIDAL IDEATION: Status: ACTIVE | Noted: 2019-01-04

## 2023-05-07 PROBLEM — K92.2 GASTROINTESTINAL HEMORRHAGE: Status: ACTIVE | Noted: 2023-05-07

## 2023-07-25 PROBLEM — D64.9 SYMPTOMATIC ANEMIA: Status: ACTIVE | Noted: 2023-07-25

## 2024-01-16 NOTE — BSMART NOTE
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 If you have any questions, please call [phone].    Please electronically 'push' imaging study to [facility] PACS system  (call Film File at [phone] after you push images so staff can retrieve them).    OR Mail to:   [facility], GI  [address]  [place] [address]      Shell Carroll MD     SOCIAL WORK GROUP THERAPY PROGRESS NOTE Group Time:  10:15am    
 
Group Topic:  Coping Skills    C D Issues Group Participation:   
 
Pt moderately involved during group discussion but remained attentive. Did identify wanting to get back to being clean & sober as he had done in the past. 
 
Discussion included the process of making \"Change\" by answering questions on handout with an emphasis on strengths & weaknesses to support improving one's self esteem. Admits sometimes focuses more on others vs self. Reviewed strategies to keep a \"Journal\" for moods, cognitions, behavior & outcome.

## 2024-10-09 ENCOUNTER — HOSPITAL ENCOUNTER (INPATIENT)
Facility: HOSPITAL | Age: 54
LOS: 16 days | Discharge: OTHER INSTITUTION | DRG: 885 | End: 2024-10-25
Attending: PSYCHIATRY & NEUROLOGY | Admitting: PSYCHIATRY & NEUROLOGY
Payer: MEDICARE

## 2024-10-09 PROBLEM — F25.9 SCHIZOAFFECTIVE DISORDER (HCC): Status: RESOLVED | Noted: 2024-10-09 | Resolved: 2024-10-09

## 2024-10-09 PROBLEM — F25.9 SCHIZOAFFECTIVE DISORDER (HCC): Status: ACTIVE | Noted: 2024-10-09

## 2024-10-09 PROCEDURE — 6370000000 HC RX 637 (ALT 250 FOR IP): Performed by: PSYCHIATRY & NEUROLOGY

## 2024-10-09 PROCEDURE — 1240000000 HC EMOTIONAL WELLNESS R&B

## 2024-10-09 RX ORDER — MAGNESIUM HYDROXIDE/ALUMINUM HYDROXICE/SIMETHICONE 120; 1200; 1200 MG/30ML; MG/30ML; MG/30ML
30 SUSPENSION ORAL EVERY 6 HOURS PRN
Status: DISCONTINUED | OUTPATIENT
Start: 2024-10-09 | End: 2024-10-25 | Stop reason: HOSPADM

## 2024-10-09 RX ORDER — OLANZAPINE 2.5 MG/1
10 TABLET, FILM COATED ORAL NIGHTLY
Status: DISCONTINUED | OUTPATIENT
Start: 2024-10-09 | End: 2024-10-10

## 2024-10-09 RX ORDER — ACETAMINOPHEN 325 MG/1
650 TABLET ORAL EVERY 4 HOURS PRN
Status: DISCONTINUED | OUTPATIENT
Start: 2024-10-09 | End: 2024-10-22

## 2024-10-09 RX ORDER — HALOPERIDOL 5 MG/1
5 TABLET ORAL 3 TIMES DAILY PRN
Status: DISCONTINUED | OUTPATIENT
Start: 2024-10-09 | End: 2024-10-09

## 2024-10-09 RX ORDER — OLANZAPINE 2.5 MG/1
5 TABLET, FILM COATED ORAL EVERY 6 HOURS PRN
Status: DISCONTINUED | OUTPATIENT
Start: 2024-10-09 | End: 2024-10-10

## 2024-10-09 RX ORDER — NICOTINE 21 MG/24HR
1 PATCH, TRANSDERMAL 24 HOURS TRANSDERMAL DAILY
Status: DISCONTINUED | OUTPATIENT
Start: 2024-10-09 | End: 2024-10-25 | Stop reason: HOSPADM

## 2024-10-09 RX ORDER — HALOPERIDOL 5 MG/ML
5 INJECTION INTRAMUSCULAR 3 TIMES DAILY PRN
Status: DISCONTINUED | OUTPATIENT
Start: 2024-10-09 | End: 2024-10-09

## 2024-10-09 RX ORDER — GUAIFENESIN/DEXTROMETHORPHAN 100-10MG/5
10 SYRUP ORAL EVERY 4 HOURS PRN
Status: DISCONTINUED | OUTPATIENT
Start: 2024-10-09 | End: 2024-10-25 | Stop reason: HOSPADM

## 2024-10-09 RX ORDER — TRAZODONE HYDROCHLORIDE 50 MG/1
50 TABLET, FILM COATED ORAL NIGHTLY PRN
Status: DISCONTINUED | OUTPATIENT
Start: 2024-10-09 | End: 2024-10-10

## 2024-10-09 RX ORDER — POLYETHYLENE GLYCOL 3350 17 G/17G
17 POWDER, FOR SOLUTION ORAL DAILY PRN
Status: DISCONTINUED | OUTPATIENT
Start: 2024-10-09 | End: 2024-10-25 | Stop reason: HOSPADM

## 2024-10-09 RX ORDER — HYDROXYZINE HYDROCHLORIDE 25 MG/1
50 TABLET, FILM COATED ORAL 3 TIMES DAILY PRN
Status: DISCONTINUED | OUTPATIENT
Start: 2024-10-09 | End: 2024-10-25 | Stop reason: HOSPADM

## 2024-10-09 RX ADMIN — ACETAMINOPHEN 650 MG: 325 TABLET ORAL at 18:29

## 2024-10-09 RX ADMIN — OLANZAPINE 5 MG: 2.5 TABLET, FILM COATED ORAL at 17:31

## 2024-10-09 RX ADMIN — GUAIFENESIN SYRUP AND DEXTROMETHORPHAN 10 ML: 100; 10 SYRUP ORAL at 20:16

## 2024-10-09 RX ADMIN — ALUMINUM HYDROXIDE, MAGNESIUM HYDROXIDE, AND SIMETHICONE 30 ML: 1200; 120; 1200 SUSPENSION ORAL at 17:40

## 2024-10-09 RX ADMIN — HYDROXYZINE HYDROCHLORIDE 50 MG: 25 TABLET, FILM COATED ORAL at 18:28

## 2024-10-09 RX ADMIN — OLANZAPINE 10 MG: 2.5 TABLET, FILM COATED ORAL at 20:15

## 2024-10-09 RX ADMIN — TRAZODONE HYDROCHLORIDE 50 MG: 50 TABLET ORAL at 20:16

## 2024-10-09 ASSESSMENT — SLEEP AND FATIGUE QUESTIONNAIRES
AVERAGE NUMBER OF SLEEP HOURS: 4
SLEEP PATTERN: DISTURBED/INTERRUPTED SLEEP
DO YOU USE A SLEEP AID: YES
DO YOU HAVE DIFFICULTY SLEEPING: YES

## 2024-10-09 ASSESSMENT — LIFESTYLE VARIABLES
HOW MANY STANDARD DRINKS CONTAINING ALCOHOL DO YOU HAVE ON A TYPICAL DAY: PATIENT DOES NOT DRINK
HOW OFTEN DO YOU HAVE A DRINK CONTAINING ALCOHOL: NEVER

## 2024-10-09 ASSESSMENT — PAIN SCALES - GENERAL
PAINLEVEL_OUTOF10: 0
PAINLEVEL_OUTOF10: 0
PAINLEVEL_OUTOF10: 8

## 2024-10-09 ASSESSMENT — PAIN DESCRIPTION - LOCATION: LOCATION: HEAD;CHEST

## 2024-10-09 NOTE — PLAN OF CARE
Problem: Self Harm/Suicidality  Goal: Will have no self-injury during hospital stay  Description: INTERVENTIONS:  1.  Ensure constant observer at bedside with Q15M safety checks  2.  Maintain a safe environment  3.  Secure patient belongings  4.  Ensure family/visitors adhere to safety recommendations  5.  Ensure safety tray has been added to patient's diet order  6.  Every shift and PRN: Re-assess suicidal risk via Frequent Screener    10/9/2024 1956 by Ant Ray RN  Outcome: Progressing     Problem: Pain  Goal: Verbalizes/displays adequate comfort level or baseline comfort level  10/9/2024 1956 by Ant Ray RN  Outcome: Progressing     Problem: ABCDS Injury Assessment  Goal: Absence of physical injury  10/9/2024 1956 by Ant Ray RN  Outcome: Progressing     Problem: Chronic Conditions and Co-morbidities  Goal: Patient's chronic conditions and co-morbidity symptoms are monitored and maintained or improved  10/9/2024 1956 by Ant Ray RN  Outcome: Progressing     Problem: Behavior  Goal: Pt/Family maintain appropriate behavior and adhere to behavioral management agreement, if implemented  Description: INTERVENTIONS:  1. Assess patient/family's coping skills and  non-compliant behavior (including use of illegal substances)  2. Notify security of behavior or suspected illegal substances which indicate the need for search of the family and/or belongings  3. Encourage verbalization of thoughts and concerns in a socially appropriate manner  4. Utilize positive, consistent limit setting strategies supporting safety of patient, staff and others  5. Encourage participation in the decision making process about the behavioral management agreement  6. If a visitor's behavior poses a threat to safety call refer to organization policy.  7. Initiate consult with , Psychosocial CNS, Spiritual Care as appropriate  10/9/2024 1956 by Ant Ray, RN  Outcome: Progressing

## 2024-10-10 PROBLEM — K21.9 GERD (GASTROESOPHAGEAL REFLUX DISEASE): Status: ACTIVE | Noted: 2024-10-10

## 2024-10-10 PROBLEM — J06.9 URI (UPPER RESPIRATORY INFECTION): Status: ACTIVE | Noted: 2024-10-10

## 2024-10-10 PROBLEM — G43.009 MIGRAINE WITHOUT AURA AND WITHOUT STATUS MIGRAINOSUS, NOT INTRACTABLE: Status: ACTIVE | Noted: 2024-10-10

## 2024-10-10 PROBLEM — G24.01 NEUROLEPTIC-INDUCED TARDIVE DYSKINESIA: Status: ACTIVE | Noted: 2024-10-10

## 2024-10-10 PROBLEM — T43.505A NEUROLEPTIC-INDUCED TARDIVE DYSKINESIA: Status: ACTIVE | Noted: 2024-10-10

## 2024-10-10 PROBLEM — F25.0 SCHIZOAFFECTIVE DISORDER, BIPOLAR TYPE (HCC): Status: ACTIVE | Noted: 2017-01-04

## 2024-10-10 PROCEDURE — 6370000000 HC RX 637 (ALT 250 FOR IP): Performed by: PSYCHIATRY & NEUROLOGY

## 2024-10-10 PROCEDURE — 1240000000 HC EMOTIONAL WELLNESS R&B

## 2024-10-10 PROCEDURE — 90792 PSYCH DIAG EVAL W/MED SRVCS: CPT | Performed by: PSYCHIATRY & NEUROLOGY

## 2024-10-10 PROCEDURE — 6370000000 HC RX 637 (ALT 250 FOR IP): Performed by: INTERNAL MEDICINE

## 2024-10-10 RX ORDER — TRAZODONE HYDROCHLORIDE 100 MG/1
200 TABLET ORAL NIGHTLY
Status: DISCONTINUED | OUTPATIENT
Start: 2024-10-10 | End: 2024-10-25 | Stop reason: HOSPADM

## 2024-10-10 RX ORDER — ACETAMINOPHEN 500 MG
500 TABLET ORAL
Status: COMPLETED | OUTPATIENT
Start: 2024-10-10 | End: 2024-10-12

## 2024-10-10 RX ORDER — HALOPERIDOL 5 MG/1
15 TABLET ORAL 2 TIMES DAILY
Status: DISCONTINUED | OUTPATIENT
Start: 2024-10-10 | End: 2024-10-25 | Stop reason: HOSPADM

## 2024-10-10 RX ORDER — QUETIAPINE FUMARATE 200 MG/1
800 TABLET, FILM COATED ORAL
Status: DISCONTINUED | OUTPATIENT
Start: 2024-10-10 | End: 2024-10-25 | Stop reason: HOSPADM

## 2024-10-10 RX ORDER — TOPIRAMATE 25 MG/1
50 TABLET, FILM COATED ORAL 3 TIMES DAILY
Status: DISCONTINUED | OUTPATIENT
Start: 2024-10-10 | End: 2024-10-25 | Stop reason: HOSPADM

## 2024-10-10 RX ORDER — PANTOPRAZOLE SODIUM 40 MG/1
40 TABLET, DELAYED RELEASE ORAL
Status: DISCONTINUED | OUTPATIENT
Start: 2024-10-10 | End: 2024-10-25 | Stop reason: HOSPADM

## 2024-10-10 RX ORDER — HALOPERIDOL 5 MG/1
5 TABLET ORAL EVERY 6 HOURS PRN
Status: DISCONTINUED | OUTPATIENT
Start: 2024-10-10 | End: 2024-10-25 | Stop reason: HOSPADM

## 2024-10-10 RX ORDER — POTASSIUM CHLORIDE 750 MG/1
20 TABLET, EXTENDED RELEASE ORAL
Status: COMPLETED | OUTPATIENT
Start: 2024-10-10 | End: 2024-10-12

## 2024-10-10 RX ORDER — HYDROCODONE POLISTIREX AND CHLORPHENIRAMINE POLISTIREX 10; 8 MG/5ML; MG/5ML
5 SUSPENSION, EXTENDED RELEASE ORAL 2 TIMES DAILY PRN
Status: COMPLETED | OUTPATIENT
Start: 2024-10-10 | End: 2024-10-15

## 2024-10-10 RX ORDER — SERTRALINE HYDROCHLORIDE 100 MG/1
200 TABLET, FILM COATED ORAL DAILY
Status: DISCONTINUED | OUTPATIENT
Start: 2024-10-10 | End: 2024-10-16

## 2024-10-10 RX ADMIN — TOPIRAMATE 50 MG: 25 TABLET, FILM COATED ORAL at 10:20

## 2024-10-10 RX ADMIN — QUETIAPINE FUMARATE 800 MG: 200 TABLET ORAL at 20:21

## 2024-10-10 RX ADMIN — ACETAMINOPHEN 650 MG: 325 TABLET ORAL at 15:46

## 2024-10-10 RX ADMIN — GUAIFENESIN SYRUP AND DEXTROMETHORPHAN 10 ML: 100; 10 SYRUP ORAL at 00:16

## 2024-10-10 RX ADMIN — HALOPERIDOL 15 MG: 5 TABLET ORAL at 10:19

## 2024-10-10 RX ADMIN — ACETAMINOPHEN 500 MG: 500 TABLET ORAL at 20:21

## 2024-10-10 RX ADMIN — SERTRALINE 200 MG: 100 TABLET, FILM COATED ORAL at 10:20

## 2024-10-10 RX ADMIN — GUAIFENESIN SYRUP AND DEXTROMETHORPHAN 10 ML: 100; 10 SYRUP ORAL at 04:37

## 2024-10-10 RX ADMIN — HALOPERIDOL 15 MG: 5 TABLET ORAL at 20:21

## 2024-10-10 RX ADMIN — TRAZODONE HYDROCHLORIDE 200 MG: 100 TABLET ORAL at 20:21

## 2024-10-10 RX ADMIN — PANTOPRAZOLE SODIUM 40 MG: 40 TABLET, DELAYED RELEASE ORAL at 15:48

## 2024-10-10 RX ADMIN — GUAIFENESIN SYRUP AND DEXTROMETHORPHAN 10 ML: 100; 10 SYRUP ORAL at 20:22

## 2024-10-10 RX ADMIN — TOPIRAMATE 50 MG: 25 TABLET, FILM COATED ORAL at 15:48

## 2024-10-10 RX ADMIN — GUAIFENESIN SYRUP AND DEXTROMETHORPHAN 10 ML: 100; 10 SYRUP ORAL at 15:46

## 2024-10-10 RX ADMIN — TOPIRAMATE 50 MG: 25 TABLET, FILM COATED ORAL at 20:21

## 2024-10-10 RX ADMIN — GUAIFENESIN SYRUP AND DEXTROMETHORPHAN 10 ML: 100; 10 SYRUP ORAL at 08:40

## 2024-10-10 RX ADMIN — POTASSIUM CHLORIDE 20 MEQ: 750 TABLET, EXTENDED RELEASE ORAL at 10:19

## 2024-10-10 RX ADMIN — ACETAMINOPHEN 650 MG: 325 TABLET ORAL at 08:04

## 2024-10-10 ASSESSMENT — PAIN DESCRIPTION - DESCRIPTORS
DESCRIPTORS: ACHING
DESCRIPTORS: ACHING

## 2024-10-10 ASSESSMENT — PAIN SCALES - GENERAL
PAINLEVEL_OUTOF10: 3
PAINLEVEL_OUTOF10: 4
PAINLEVEL_OUTOF10: 4
PAINLEVEL_OUTOF10: 9
PAINLEVEL_OUTOF10: 0
PAINLEVEL_OUTOF10: 8

## 2024-10-10 ASSESSMENT — PAIN DESCRIPTION - LOCATION
LOCATION: HEAD

## 2024-10-10 ASSESSMENT — PAIN SCALES - WONG BAKER
WONGBAKER_NUMERICALRESPONSE: HURTS A LITTLE BIT
WONGBAKER_NUMERICALRESPONSE: 2;4
WONGBAKER_NUMERICALRESPONSE: HURTS LITTLE MORE

## 2024-10-10 ASSESSMENT — PAIN - FUNCTIONAL ASSESSMENT: PAIN_FUNCTIONAL_ASSESSMENT: PREVENTS OR INTERFERES SOME ACTIVE ACTIVITIES AND ADLS

## 2024-10-10 NOTE — BH NOTE
Affect incongruent, mood anxious/depressed. Patient a & o x 4.  Denies pain. No SI, HI, AVH. Patient tolerated medications well, cooperative. Patient has a consistent dry cough. Has been given Robitussin DM three times this shift. Patient has been encouraged to sleep with a wedge to keep his head elevated.  Currently sleeping during the time of this note.    PRN Medication Documentation    Specific patient behavior that led to the need for PRN medication: Dry Cough  Staff interventions attempted prior to PRN being given: Physical Assessment/informed provider  PRN medication given: Robitussin DM at 2016, 0011, 0435  Patient responsiveness/effectiveness of PRN medication:  Tolerated Well, Effective    Slept 3h 45min

## 2024-10-10 NOTE — BH NOTE
Group Therapy Note    Date: 10/10/2024    Pt did not attend group today but I will encourage him to participate in the future as part of his recovery plan.         Signature:  Ibeth Taylor LCSW

## 2024-10-10 NOTE — BH NOTE
Affect incongruent, mood depressed. Patient alert and oriented x4. His pain has been between score 4 and  8 despite the non pharmacological and pharmacological interventions. He tolerated meds well, cooperative and interacted well with staff and peers.    PRN Medication Documentation  Specific patient behavior that led to the need for PRN medication: Headache and chestpains  Staff interventions attempted prior to PRN being given: Assessment/ informed provider  PRN medication given: Tylenol 650 mg at 8.04am and Tylenol 650mg at 1546hrs    Specific patient behavior that led to the need for PRN medication: Persistent cough  Staff interventions: Assessment/ informed provider  PRN medication given: Robitussin 10mls @0840hrs and again @1546hrs.  His lab samples will be drawn tomorrow.

## 2024-10-10 NOTE — BH NOTE
PSYCHOSOCIAL ASSESSMENT  :Patient identifying info:   Benitez Harmon is a 54 y.o., male admitted 10/9/2024  2:40 PM     Presenting problem and precipitating factors: Per Dr. Aureliano DAVISON The patient is a 54-year-old single male, who has a lengthy past psychiatric history of schizoaffective disorder, bipolar type. He has a history of multiple hospitalizations over his lifetime, including several this year alone. Each of those times has been due to exacerbation of psychosis, essentially worsening auditory hallucinations, which become very distressing to him. Most of those have also followed relapse on crack cocaine, something that he struggled with for many years. Most recently, he had been hospitalized for this second time in 2 months at [facility] in July of the of this year and then transition to the [facility] for substance abuse treatment for 2 months. He was discharged on 10/03, and moved in with his father in [place], [place]. However, he apparently got into some type of argument with his father, and he ended up relapsing on crack cocaine again a couple days later. He then felt extremely distressed both from coming down from the crack, but also having relapsed after so much time spent dealing with his substance abuse issues, and he took an overdose of Tylenol, roughly 15 pills. His friend called 911 and he was brought to the Emergency Room on 10/07 and then admitted here 2 days later. It should be noted that he has a history of having taken multiple overdoses previously, including at least 2 more this year alone. He states that currently the voices have intensified telling him derogatory things such as he needs to kill himself, he has no good, etc. There was some reference to having some visual hallucinations of seeing  relatives, but that has not been consistent and may be more related to substance use.   Mental status assessment: The patient was noted to be a casually

## 2024-10-10 NOTE — CONSULTS
[facility]   Hospitalist [facility] Admission History & Physical        10/10/2024 8:00 PM  Patient: Benitez Harmon 1970  PCP: Murphy Ventura MD    HISTORY  Chief Complaint: No chief complaint on file.      HPI: 54 y.o. male presenting for admission to [facility] for further evaluation and treatment for Schizoaffective disorder, bipolar type (HCC).  He  has a past medical history of Adenomatous colon polyp, Anxiety, Arthritis, Asthma, Back pain, CPAP (continuous positive airway pressure) dependence, Depression, Dizziness, Gallbladder disease, GERD (gastroesophageal reflux disease), Hemorrhoids, Joint pain, Migraine, Neck pain, Pancreatitis, PUD (peptic ulcer disease), Rectal bleeding, Schizoaffective disorder (HCC), Sleep apnea, and Suicide ([facility]).    Pt presents for admission to [facility] Inpatient Unit in transfer from the ED in [place]  He has a h/o Depression and SI    He has a recent URI illness with protracted nonproductive cough and sore throat  He has been started on tx Robitussin DM w/o much benefit    He has h/o constipation on Linzess which he says he needs this daily  Some loose stooling today following his supper  C/o Heart burn    Past Medical History:  Past Medical History:   Diagnosis Date    Adenomatous colon polyp     Anxiety     Arthritis     Asthma     Back pain     CPAP (continuous positive airway pressure) dependence     Depression     Dizziness     Gallbladder disease     GERD (gastroesophageal reflux disease)     Hemorrhoids     Joint pain     Migraine     Neck pain     Pancreatitis     PUD (peptic ulcer disease)     Rectal bleeding     Schizoaffective disorder (HCC)     Sleep apnea     does not use CPAP    Suicide (HCC)        Past Surgical History:  Past Surgical History:   Procedure Laterality Date    CAPSULE ENDOSCOPY N/A 7/28/2023    ESOPHAGEAL CAPSULE ENDOSCOPY performed by Wyatt Li MD at [facility] ENDOSCOPY    COLONOSCOPY N/A 3/29/2021    DIAGNOSTIC  Clear Clear   Glucose, Ur Negative Negative   Bilirubin, Urine Negative Small !   Ketones, Urine Negative 15 !   Specific Gravity, UA >=1.030 >=1.030   Blood, Urine Trace-lysed ! Trace-intact !   Protein, UA Negative    Urobilinogen  1.0   Nitrite, Urine Negative Negative   Leukocyte Esterase, Urine Negative Negative   pH, Urine 6.0 6.0   Protein, Urine  30 !   Urobilinogen, UA, POCT 0.2      EKG: 10/8   NSR 96 bpm, Cannot r/o Inf MI, Since [id number] note increased HR by 46 bpm,     Radiology:  pCXR 10/8:  Trachea and heart size are within normal limits. Prominence of lung markings at  the bases. No focal consolidation.   IMPRESSION:   No acute pulmonary process.    Care Plan discussed with:   Patient x    Family     RN x         Consultant      Expected  Disposition:   Home with Family x   HH/PT/OT/RN    SNF/LTC    HENRI      TOTAL TIME:  40 Minutes      Comments    x Reviewed previous records   >50% of visit spent in counseling and coordination of care x Discussion with patient and/or family and questions answered       _______________________________________________________     My assessment of this patient's clinical condition and my plan of care is as follows.    ASSESSMENT / PLAN    Principal Problem:    Schizoaffective disorder, bipolar type (HCC)  Active Problems:    Cocaine use disorder, severe, dependence (HCC)    Neuroleptic-induced tardive dyskinesia  Evaluation and Treatment per [facility] / Dr Bedoya  Seems stable this evening  Cont tx Haldol 15mg, Seroquel 800mg bedtime, Zoloft 200mg daily, Topamaxx 50mg tid, Trazodone 200mg qhs      GERD (gastroesophageal reflux disease)  Resume PPI in AM with Protonix 40mg daily  Maalox prn      Migraine without aura and without status migrainosus, not intractable  HA history  No apparent Migraine current  Has been Rx AIMOVIG prn  Rx Tylenol tid and prn      URI (upper respiratory infection)  Protracted cough  Will tx Tussionex 5cc bid x 4 doses  Tylenol

## 2024-10-10 NOTE — PLAN OF CARE
Precautions: Q 15 safety checks    SI/HI - denies    AVH - denies    Pain - at 1230pm he c/o pain in the head and abdomen    Medication Compliance: yes    PRN Meds: Tylenol 650mg and was effective    Behaviors ; calm and cooperative      Group Attendance; yes    Intake: 70% of meals and snacks

## 2024-10-10 NOTE — PLAN OF CARE
Problem: Self Harm/Suicidality  Goal: Will have no self-injury during hospital stay  Description: INTERVENTIONS:  1.  Ensure constant observer at bedside with Q15M safety checks  2.  Maintain a safe environment  3.  Secure patient belongings  4.  Ensure family/visitors adhere to safety recommendations  5.  Ensure safety tray has been added to patient's diet order  6.  Every shift and PRN: Re-assess suicidal risk via Frequent Screener    10/10/2024 1946 by Ant Ray RN  Outcome: Progressing     Problem: Pain  Goal: Verbalizes/displays adequate comfort level or baseline comfort level  10/10/2024 1946 by Ant Ray RN  Outcome: Progressing     Problem: ABCDS Injury Assessment  Goal: Absence of physical injury  10/10/2024 1946 by Ant Ray RN  Outcome: Progressing     Problem: Chronic Conditions and Co-morbidities  Goal: Patient's chronic conditions and co-morbidity symptoms are monitored and maintained or improved  10/10/2024 1946 by Ant Ray RN  Outcome: Progressing     Problem: Coping  Goal: Pt/Family able to verbalize concerns and demonstrate effective coping strategies  Description: INTERVENTIONS:  1. Assist patient/family to identify coping skills, available support systems and cultural and spiritual values  2. Provide emotional support, including active listening and acknowledgement of concerns of patient and caregivers  3. Reduce environmental stimuli, as able  4. Instruct patient/family in relaxation techniques, as appropriate  5. Assess for spiritual pain/suffering and initiate Spiritual Care, Psychosocial Clinical Specialist consults as needed  10/10/2024 1946 by Ant Ray RN  Outcome: Progressing     Problem: Behavior  Goal: Pt/Family maintain appropriate behavior and adhere to behavioral management agreement, if implemented  Description: INTERVENTIONS:  1. Assess patient/family's coping skills and  non-compliant behavior (including use of illegal substances)  2.

## 2024-10-10 NOTE — H&P
[address]  [address]                                PSYCH H&P      PATIENT NAME: WOLF BRANDON            : 1970  MED REC NO: [id number]                       ROOM: 234  ACCOUNT NO: [id number]                       ADMIT DATE: 10/09/2024  PROVIDER: Kahlil Bedoya MD      HISTORY OF PRESENT ILLNESS:  The patient is a 54-year-old single male, who has a lengthy past psychiatric history of schizoaffective disorder, bipolar type.  He has a history of multiple hospitalizations over his lifetime, including several this year alone.  Each of those times has been due to exacerbation of psychosis, essentially worsening auditory hallucinations, which become very distressing to him.  Most of those have also followed relapse on crack cocaine, something that he struggled with for many years.    Most recently, he had been hospitalized for this second time in 2 months at [facility] in July of the of this year and then transition to the [facility] for substance abuse treatment for 2 months.  He was discharged on 10/03, and moved in with his father in [place], [place].  However, he apparently got into some type of argument with his father, and he ended up relapsing on crack cocaine again a couple days later.  He then felt extremely distressed both from coming down from the crack, but also having relapsed after so much time spent dealing with his substance abuse issues, and he took an overdose of Tylenol, roughly 15 pills.  His friend called 911 and he was brought to the Emergency Room on 10/07 and then admitted here 2 days later.    It should be noted that he has a history of having taken multiple overdoses previously, including at least 2 more this year alone.  He states that currently the voices have intensified telling him derogatory things such as he needs to kill himself, he has no good, etc.  There was  Deferred.  Axis III:  Possible tardive dyskinesia (G24.01); migraine headaches; GERD; multiple prior overdoses.  Axis IV:  Stressors are moderate (possible homelessness, limited support).  Axis V:  GAF currently is 35.    PLAN:    1. We will admit the patient for further supportive treatment, close observation, increased structure, and involvement within the groups and milieu as tolerated.  2. I will continue/restart the medicines noted above given his reports that they have been the most effective for him.  3. We will need to keep an eye for his possible tardive dyskinesia symptoms, and particularly whether this may be more tic-related movements.  However, he is aware that the Seroquel and Haldol could be what is causing this and he continues to want to take those medicines.  4. Estimated length of stay would likely be 7 to 10 days given his history.    I certify that this patient's inpatient psychiatric hospital admission is medically necessary for treatment, which could reasonably be expected to improve his condition, or for diagnostic study.  The inpatient psychiatric services are provided while he is under the care of a physician and are included in the individualized plan of care.        MD BAILEY GUILLERMO/WILLEM  D:  10/10/2024 10:17:19  T:  10/10/2024 10:57:46  JOB #:  [id number]

## 2024-10-10 NOTE — BH NOTE
Patient was admitted to the unit voluntary for a suicide attempt. Patient wanted to kil himself due to relapsing on crack cocaine. Patient had been in rehab and was just released. Patient reports doing this after an argument with his father, chronic pain from a hernia, and experiencing AH. Patient is connected with out patient providers for mental health. He was not connected with any outpatient providers for SA. Patient lives with his father and he plans on returning home. This writer will get with patient to determine his outpatient providers and see if he is interested in any SA treatment.

## 2024-10-10 NOTE — PLAN OF CARE
Problem: Self Harm/Suicidality  Goal: Will have no self-injury during hospital stay  Description: INTERVENTIONS:  1.  Ensure constant observer at bedside with Q15M safety checks  2.  Maintain a safe environment  3.  Secure patient belongings  4.  Ensure family/visitors adhere to safety recommendations  5.  Ensure safety tray has been added to patient's diet order  6.  Every shift and PRN: Re-assess suicidal risk via Frequent Screener    10/10/2024 1728 by Perri Pal RN  Outcome: Progressing  10/10/2024 1341 by Deisy Velasquez RN  Outcome: Progressing     Problem: Chronic Conditions and Co-morbidities  Goal: Patient's chronic conditions and co-morbidity symptoms are monitored and maintained or improved  10/10/2024 1728 by Perri Pal RN  Outcome: Progressing  10/10/2024 1341 by Deisy Velasquez RN  Outcome: Progressing  Flowsheets (Taken 10/10/2024 0909)  Care Plan - Patient's Chronic Conditions and Co-Morbidity Symptoms are Monitored and Maintained or Improved: Monitor and assess patient's chronic conditions and comorbid symptoms for stability, deterioration, or improvement

## 2024-10-11 LAB
CHOLEST SERPL-MCNC: 117 MG/DL
EST. AVERAGE GLUCOSE BLD GHB EST-MCNC: 105 MG/DL
HBA1C MFR BLD: 5.3 % (ref 4–5.6)
HDLC SERPL-MCNC: 47 MG/DL
HDLC SERPL: 2.5 (ref 0–5)
LDLC SERPL CALC-MCNC: 59.6 MG/DL (ref 0–100)
TRIGL SERPL-MCNC: 52 MG/DL
VLDLC SERPL CALC-MCNC: 10.4 MG/DL

## 2024-10-11 PROCEDURE — 36415 COLL VENOUS BLD VENIPUNCTURE: CPT

## 2024-10-11 PROCEDURE — 80061 LIPID PANEL: CPT

## 2024-10-11 PROCEDURE — 6370000000 HC RX 637 (ALT 250 FOR IP): Performed by: PSYCHIATRY & NEUROLOGY

## 2024-10-11 PROCEDURE — 1240000000 HC EMOTIONAL WELLNESS R&B

## 2024-10-11 PROCEDURE — 83036 HEMOGLOBIN GLYCOSYLATED A1C: CPT

## 2024-10-11 PROCEDURE — 6370000000 HC RX 637 (ALT 250 FOR IP): Performed by: INTERNAL MEDICINE

## 2024-10-11 PROCEDURE — 99232 SBSQ HOSP IP/OBS MODERATE 35: CPT | Performed by: PSYCHIATRY & NEUROLOGY

## 2024-10-11 RX ADMIN — POTASSIUM CHLORIDE 20 MEQ: 750 TABLET, EXTENDED RELEASE ORAL at 08:35

## 2024-10-11 RX ADMIN — PANTOPRAZOLE SODIUM 40 MG: 40 TABLET, DELAYED RELEASE ORAL at 06:28

## 2024-10-11 RX ADMIN — TOPIRAMATE 50 MG: 25 TABLET, FILM COATED ORAL at 08:42

## 2024-10-11 RX ADMIN — PANTOPRAZOLE SODIUM 40 MG: 40 TABLET, DELAYED RELEASE ORAL at 15:59

## 2024-10-11 RX ADMIN — TOPIRAMATE 50 MG: 25 TABLET, FILM COATED ORAL at 21:01

## 2024-10-11 RX ADMIN — SERTRALINE 200 MG: 100 TABLET, FILM COATED ORAL at 08:35

## 2024-10-11 RX ADMIN — ACETAMINOPHEN 500 MG: 500 TABLET ORAL at 08:35

## 2024-10-11 RX ADMIN — TOPIRAMATE 50 MG: 25 TABLET, FILM COATED ORAL at 14:38

## 2024-10-11 RX ADMIN — ACETAMINOPHEN 500 MG: 500 TABLET ORAL at 13:14

## 2024-10-11 RX ADMIN — ACETAMINOPHEN 500 MG: 500 TABLET ORAL at 18:07

## 2024-10-11 RX ADMIN — ACETAMINOPHEN 500 MG: 500 TABLET ORAL at 20:59

## 2024-10-11 RX ADMIN — HYDROCODONE POLISTIREX AND CHLORPHENIRAMINE POLISTIREX 5 ML: 10; 8 SUSPENSION, EXTENDED RELEASE ORAL at 16:02

## 2024-10-11 RX ADMIN — QUETIAPINE FUMARATE 800 MG: 200 TABLET ORAL at 21:01

## 2024-10-11 RX ADMIN — TRAZODONE HYDROCHLORIDE 200 MG: 100 TABLET ORAL at 21:02

## 2024-10-11 RX ADMIN — HALOPERIDOL 15 MG: 5 TABLET ORAL at 21:01

## 2024-10-11 RX ADMIN — GUAIFENESIN SYRUP AND DEXTROMETHORPHAN 10 ML: 100; 10 SYRUP ORAL at 21:10

## 2024-10-11 RX ADMIN — HALOPERIDOL 15 MG: 5 TABLET ORAL at 08:34

## 2024-10-11 RX ADMIN — GUAIFENESIN SYRUP AND DEXTROMETHORPHAN 10 ML: 100; 10 SYRUP ORAL at 08:42

## 2024-10-11 ASSESSMENT — PAIN - FUNCTIONAL ASSESSMENT: PAIN_FUNCTIONAL_ASSESSMENT: PREVENTS OR INTERFERES SOME ACTIVE ACTIVITIES AND ADLS

## 2024-10-11 ASSESSMENT — PAIN SCALES - GENERAL
PAINLEVEL_OUTOF10: 8
PAINLEVEL_OUTOF10: 7
PAINLEVEL_OUTOF10: 7

## 2024-10-11 ASSESSMENT — PAIN DESCRIPTION - ORIENTATION
ORIENTATION: OTHER (COMMENT)
ORIENTATION: OTHER (COMMENT)

## 2024-10-11 ASSESSMENT — PAIN DESCRIPTION - DESCRIPTORS
DESCRIPTORS: ACHING
DESCRIPTORS: STABBING

## 2024-10-11 ASSESSMENT — PAIN DESCRIPTION - LOCATION
LOCATION: GENERALIZED
LOCATION: HEAD;ABDOMEN
LOCATION: GENERALIZED
LOCATION: OTHER (COMMENT)

## 2024-10-11 NOTE — PLAN OF CARE
Problem: Self Harm/Suicidality  Goal: Will have no self-injury during hospital stay  Description: INTERVENTIONS:  1.  Ensure constant observer at bedside with Q15M safety checks  2.  Maintain a safe environment  3.  Secure patient belongings  4.  Ensure family/visitors adhere to safety recommendations  5.  Ensure safety tray has been added to patient's diet order  6.  Every shift and PRN: Re-assess suicidal risk via Frequent Screener    10/11/2024 0942 by Perri Pal RN  Outcome: Progressing  Flowsheets  Taken 10/11/2024 0813 by Perri Pal RN  Will have no self-injury during hospital stay: Maintain a safe environment  Taken 10/10/2024 1949 by Ant Ray RN  Will have no self-injury during hospital stay: Maintain a safe environment  10/10/2024 1946 by Ant Ray RN  Outcome: Progressing     Problem: Pain  Goal: Verbalizes/displays adequate comfort level or baseline comfort level  10/11/2024 0942 by Perri Pal RN  Outcome: Progressing  10/10/2024 1946 by Ant Ray RN  Outcome: Progressing     Problem: ABCDS Injury Assessment  Goal: Absence of physical injury  10/10/2024 1946 by Ant Ray RN  Outcome: Progressing

## 2024-10-11 NOTE — PROGRESS NOTES
INTERVAL Hx:  Records and clinical information were reviewed. States he's still not feeling well, both from his URI/bronchitis as well as the psychosis and some dysphoria associated with it. He still has AH's that tell him he's worthless and to harm himself, but he's able to refrain from acting on it. Denies any true SI or intent now. He's not having any other obvious positive psychotic symptoms--no apparent delusions or overt thought disorganization. Tolerating the meds well--no sedation, EPSE's, or worsened TD symptoms. His facial grimaces seem to be less frequent today, as well. Will continue the current regimen given how stable he'd been a week ago. Waiting on getting Tussinex for his cough. Talked with his father and he's able to return there once stable. Slept 7.25 hours last night.    MEDS:  Current Facility-Administered Medications   Medication Dose Route Frequency    pantoprazole (PROTONIX) tablet 40 mg  40 mg Oral BID AC    haloperidol (HALDOL) tablet 15 mg  15 mg Oral BID    potassium chloride (KLOR-CON) extended release tablet 20 mEq  20 mEq Oral Daily with breakfast    QUEtiapine (SEROQUEL) tablet 800 mg  800 mg Oral QHS    sertraline (ZOLOFT) tablet 200 mg  200 mg Oral Daily    topiramate (TOPAMAX) tablet 50 mg  50 mg Oral TID    traZODone (DESYREL) tablet 200 mg  200 mg Oral Nightly    haloperidol (HALDOL) tablet 5 mg  5 mg Oral Q6H PRN    HYDROcodone-chlorpheniramine (TUSSIONEX) 10-8 MG/5ML oral suspension 5 mL  5 mL Oral BID PRN    acetaminophen (TYLENOL) tablet 500 mg  500 mg Oral 4x Daily PC & HS    acetaminophen (TYLENOL) tablet 650 mg  650 mg Oral Q4H PRN    hydrOXYzine HCl (ATARAX) tablet 50 mg  50 mg Oral TID PRN    polyethylene glycol (GLYCOLAX) packet 17 g  17 g Oral Daily PRN    nicotine (NICODERM CQ) 14 MG/24HR 1 patch  1 patch TransDERmal Daily    influenza tiss-cult vaccine (FLUCELVAX) injection 0.5 mL  0.5 mL IntraMUSCular Prior to discharge    aluminum & magnesium hydroxide-simethicone

## 2024-10-11 NOTE — BH NOTE
Group Therapy Note    Date: 10/11/2024    Group Start Time: 0900  Group End Time: 0950  Group Topic: Psychotherapy    [facility]    Nic, Liset S, LCSW        Group Therapy Note    Attendees: 4 scheduled    Focus of session was based on information from article from Dr. Tracie Pena on “Why Willpower Doesn't Work.”  I shared the information about how our brains have been created and how humans have operated since the beginning of time.  We spoke about addiction and how the definition is typically: continued use despite adverse consequences.  We spoke about how we create unhealthy behaviors and habits and why willpower does not work.  I shared the authors encouragement to practice mindfulness and being aware and then to focus on getting curious with ourselves by asking questions like why am I doing this or about to do it?  We spoke about the goal of finding a bigger and better reward overall and how this awareness can help make important changes that will impact our mental health and well being.         Patient's Goal:  Discharge to home or other facility with appropriate resources     Notes:  Benitez came to group late because he said he was not feeling well.  He was able to share a few thoughts in group but he was guarded in talking about fight with his fathers girlfriend that led to relapse and this hospitalization. He was able to say he had learned some coping strategies in his recent treatment but he would not share his thoughts.  He was pleasant and cooperative.      Status After Intervention:  Unchanged    Participation Level: Active Listener and Interactive    Participation Quality: Appropriate, Attentive, Sharing, and Supportive      Speech:  normal      Thought Process/Content: Logical  Linear      Affective Functioning: Congruent and Flat      Mood: anxious      Level of consciousness:  Oriented x4, Attentive, and  Preoccupied      Response to Learning: Able to verbalize current knowledge/experience and Resistant      Endings: None Reported    Modes of Intervention: Education, Support, Socialization, Exploration, and Clarifying      Discipline Responsible: /Counselor      Signature:  Liset Lucero LCSW

## 2024-10-11 NOTE — BH NOTE
Affect constricted, mood depressed/anxious. Alert and oriented X 4. Cooperative and pleasant. Attended and participated in group. Interacted with staff and peers. Makes needs known. Ate all meals and snacks. Denied SI/HI/AVH and pain. Medication compliant. Stable with no s/s of distress.      PRN Medication Documentation    Specific patient behavior that led to need for PRN medication: coughing  Staff interventions attempted prior to PRN being given: assessed patient, provided water.  PRN medication given: Robitussin 10 ml given po @ 08:42.  Patient response/effectiveness of PRN medication: Coughing decreased.    PRN Medication Documentation    Specific patient behavior that led to need for PRN medication: continued coughing this afternoon.   Staff interventions attempted prior to PRN being given: assessed.  PRN medication given: Tussinex  5 ml given for cough @ 1602.   Patient response/effectiveness of PRN medication: Coughing stopped at this time 1711. Eating dinner without coughing.

## 2024-10-11 NOTE — BH NOTE
[facility]  Master Treatment Plan for Benitez Harmon    Date Treatment Plan Initiated: 10/9/2024    Treatment Plan Modalities:  Type of Modality Amount  (x minutes) Frequency (x/week) Duration (x days) Name of Responsible Staff   Community & wrap-up meetings to encourage peer interactions 30 14 1 Patricia MICHELE JAYLA   Group psychotherapy to assist in building coping skills and internal controls 60 5 1 Veronica HASSAN, JD HASSAN, JD   Therapeutic activity groups to build coping skills 60 7 1 Ann CASTRO, TERRANCE KRISHNAN RN     Psychoeducation in group setting to address:   Medication education  Coping Skills  Symptom Management   60 7 1 JD Allen, JD MEZA RN   Discharge planning   60 2 1 Brigid HASSAN JAYLA II            Physician medication management   15 7 1 JASMEET Bedoya MD                                         Treatment Team Signatures    I have participated in the development of this plan of treatment and agree to its implementation.    Patient Signature       Patient Printed Name Date/Time   Social Work/Therapist Signature       Social Work/Therapist Printed Name Date/Time   RN Signature       RN Printed Name   Date/Time    10/9/2024   Other Signature     Other Signature Date/Time   MD Signature       MD Printed Name Date/Time

## 2024-10-11 NOTE — BH NOTE
Affect blunt, mood depressed. Patient a & o x 4.  Denies pain. No HI, AVH. Patient tolerated medications well, cooperative. Patient was seen by Dr. Warner with new orders due to cold like symptoms. Patient consumed most of his dinner. Patient is understanding of new medications ordered.  Currently sleeping during the time of this note.    PRN Medication Documentation    Specific patient behavior that led to the need for PRN medication: Dry Cough  Staff interventions attempted prior to PRN being given: Physical Assessment  PRN medication given: Robitussin DM  Patient responsiveness/effectiveness of PRN medication: Tolerated Well, Effective    Slept 7h 15min

## 2024-10-12 PROCEDURE — 6370000000 HC RX 637 (ALT 250 FOR IP): Performed by: PSYCHIATRY & NEUROLOGY

## 2024-10-12 PROCEDURE — 6370000000 HC RX 637 (ALT 250 FOR IP): Performed by: INTERNAL MEDICINE

## 2024-10-12 PROCEDURE — 1240000000 HC EMOTIONAL WELLNESS R&B

## 2024-10-12 RX ADMIN — TOPIRAMATE 50 MG: 25 TABLET, FILM COATED ORAL at 17:43

## 2024-10-12 RX ADMIN — SERTRALINE 200 MG: 100 TABLET, FILM COATED ORAL at 08:49

## 2024-10-12 RX ADMIN — HALOPERIDOL 15 MG: 5 TABLET ORAL at 08:49

## 2024-10-12 RX ADMIN — ACETAMINOPHEN 500 MG: 500 TABLET ORAL at 17:44

## 2024-10-12 RX ADMIN — PANTOPRAZOLE SODIUM 40 MG: 40 TABLET, DELAYED RELEASE ORAL at 08:50

## 2024-10-12 RX ADMIN — ACETAMINOPHEN 500 MG: 500 TABLET ORAL at 12:31

## 2024-10-12 RX ADMIN — QUETIAPINE FUMARATE 800 MG: 200 TABLET ORAL at 20:44

## 2024-10-12 RX ADMIN — TOPIRAMATE 50 MG: 25 TABLET, FILM COATED ORAL at 20:44

## 2024-10-12 RX ADMIN — POTASSIUM CHLORIDE 20 MEQ: 750 TABLET, EXTENDED RELEASE ORAL at 08:49

## 2024-10-12 RX ADMIN — TOPIRAMATE 50 MG: 25 TABLET, FILM COATED ORAL at 08:49

## 2024-10-12 RX ADMIN — HALOPERIDOL 15 MG: 5 TABLET ORAL at 20:44

## 2024-10-12 RX ADMIN — GUAIFENESIN SYRUP AND DEXTROMETHORPHAN 10 ML: 100; 10 SYRUP ORAL at 17:44

## 2024-10-12 RX ADMIN — ALUMINUM HYDROXIDE, MAGNESIUM HYDROXIDE, AND SIMETHICONE 30 ML: 1200; 120; 1200 SUSPENSION ORAL at 12:31

## 2024-10-12 RX ADMIN — ACETAMINOPHEN 500 MG: 500 TABLET ORAL at 08:49

## 2024-10-12 RX ADMIN — GUAIFENESIN SYRUP AND DEXTROMETHORPHAN 10 ML: 100; 10 SYRUP ORAL at 08:49

## 2024-10-12 RX ADMIN — GUAIFENESIN SYRUP AND DEXTROMETHORPHAN 10 ML: 100; 10 SYRUP ORAL at 20:45

## 2024-10-12 RX ADMIN — HYDROCODONE POLISTIREX AND CHLORPHENIRAMINE POLISTIREX 5 ML: 10; 8 SUSPENSION, EXTENDED RELEASE ORAL at 08:56

## 2024-10-12 RX ADMIN — TRAZODONE HYDROCHLORIDE 200 MG: 100 TABLET ORAL at 20:45

## 2024-10-12 RX ADMIN — HYDROCODONE POLISTIREX AND CHLORPHENIRAMINE POLISTIREX 5 ML: 10; 8 SUSPENSION, EXTENDED RELEASE ORAL at 20:45

## 2024-10-12 ASSESSMENT — PAIN DESCRIPTION - DESCRIPTORS
DESCRIPTORS: STABBING
DESCRIPTORS: OTHER (COMMENT)

## 2024-10-12 ASSESSMENT — PAIN SCALES - GENERAL
PAINLEVEL_OUTOF10: 3
PAINLEVEL_OUTOF10: 7
PAINLEVEL_OUTOF10: 2

## 2024-10-12 ASSESSMENT — PAIN SCALES - WONG BAKER: WONGBAKER_NUMERICALRESPONSE: NO HURT

## 2024-10-12 NOTE — GROUP NOTE
Group Therapy Note    Date: 10/11/2024    Group Start Time: 2000  Group End Time: 2030  Group Topic: Community Meeting    [facility]    Phyllis Olivera        Group Therapy Note It was just Manuel out for Wrap-Up Group tonight.    Attendees: 1       Notes: Benitez was not out for Wrap-Up Group this evening.     Signature:  Phyllis Olivera

## 2024-10-12 NOTE — BH NOTE
Weekend Coverage:    CC:\"I am heaing voices and seeing things\"      Mr. Harmon reports that he is seeing shadows and people \"that are not supposed to be here anymore\". He reports that the voices are trying to convince him to leave and hurt himself. He states \"I am having a hard time right now\"    Sleep: reports sleeping half of the night but continues to struggle with AVH  Appetite: good  Si/HI: He reports SI with no plan  Medications: compliant he denies medication side effects, he feels that his medications are effective.     Plan: continue current treatment plan.

## 2024-10-13 PROCEDURE — 6370000000 HC RX 637 (ALT 250 FOR IP): Performed by: PSYCHIATRY & NEUROLOGY

## 2024-10-13 PROCEDURE — 1240000000 HC EMOTIONAL WELLNESS R&B

## 2024-10-13 RX ADMIN — SERTRALINE 200 MG: 100 TABLET, FILM COATED ORAL at 08:37

## 2024-10-13 RX ADMIN — TRAZODONE HYDROCHLORIDE 200 MG: 100 TABLET ORAL at 20:32

## 2024-10-13 RX ADMIN — TOPIRAMATE 50 MG: 25 TABLET, FILM COATED ORAL at 20:32

## 2024-10-13 RX ADMIN — PANTOPRAZOLE SODIUM 40 MG: 40 TABLET, DELAYED RELEASE ORAL at 07:00

## 2024-10-13 RX ADMIN — TOPIRAMATE 50 MG: 25 TABLET, FILM COATED ORAL at 15:20

## 2024-10-13 RX ADMIN — GUAIFENESIN SYRUP AND DEXTROMETHORPHAN 10 ML: 100; 10 SYRUP ORAL at 08:59

## 2024-10-13 RX ADMIN — ACETAMINOPHEN 650 MG: 325 TABLET ORAL at 08:57

## 2024-10-13 RX ADMIN — ACETAMINOPHEN 650 MG: 325 TABLET ORAL at 20:38

## 2024-10-13 RX ADMIN — HALOPERIDOL 15 MG: 5 TABLET ORAL at 08:37

## 2024-10-13 RX ADMIN — PANTOPRAZOLE SODIUM 40 MG: 40 TABLET, DELAYED RELEASE ORAL at 15:20

## 2024-10-13 RX ADMIN — QUETIAPINE FUMARATE 800 MG: 200 TABLET ORAL at 20:32

## 2024-10-13 RX ADMIN — HALOPERIDOL 15 MG: 5 TABLET ORAL at 20:32

## 2024-10-13 RX ADMIN — TOPIRAMATE 50 MG: 25 TABLET, FILM COATED ORAL at 08:37

## 2024-10-13 RX ADMIN — ALUMINUM HYDROXIDE, MAGNESIUM HYDROXIDE, AND SIMETHICONE 30 ML: 1200; 120; 1200 SUSPENSION ORAL at 20:37

## 2024-10-13 ASSESSMENT — PAIN DESCRIPTION - DESCRIPTORS
DESCRIPTORS: DISCOMFORT;HEAVINESS
DESCRIPTORS: ACHING

## 2024-10-13 ASSESSMENT — PAIN SCALES - GENERAL
PAINLEVEL_OUTOF10: 7
PAINLEVEL_OUTOF10: 3
PAINLEVEL_OUTOF10: 1

## 2024-10-13 ASSESSMENT — PAIN SCALES - WONG BAKER
WONGBAKER_NUMERICALRESPONSE: HURTS A LITTLE BIT
WONGBAKER_NUMERICALRESPONSE: HURTS A LITTLE BIT

## 2024-10-13 ASSESSMENT — PAIN DESCRIPTION - LOCATION
LOCATION: HEAD
LOCATION: ABDOMEN;HEAD

## 2024-10-13 ASSESSMENT — PAIN - FUNCTIONAL ASSESSMENT: PAIN_FUNCTIONAL_ASSESSMENT: PREVENTS OR INTERFERES SOME ACTIVE ACTIVITIES AND ADLS

## 2024-10-13 NOTE — BH NOTE
Patient presents flat and tends to avoid gaze. Patient denies HI but is positive for SI, patient states that he has no plan and was educated on speaking to staff when those feelings arise. Patient also stated positive AVH stating, \"I see spots and shadows and people who are no longer with the living,\" and stating the voices tell him to harm himself. Patient remains isolative but will interact with peers if spoken to. Patient complained of headache and requested tylenol which was given to him, see below. Patient can be easily distracted but can be redirected back to original topic. Patient shows no s/s of distress at this time.         PRN Medication Documentation     Specific patient behavior that led to the need for PRN medication: mild pain  Staff interventions attempted prior to PRN being given: patient requested  PRN medication given: Tylenol 650 mg PO @ 0857  Patient responsiveness/effectiveness of PRN medication: pt stated his pain has decreased    PRN Medication Documentation     Specific patient behavior that led to the need for PRN medication: cough  Staff interventions attempted prior to PRN being given: patient requested  PRN medication given: Robitussin 10 mL PO @ 0859  Patient responsiveness/effectiveness of PRN medication: pt stated medication helped decrease coughing spells

## 2024-10-13 NOTE — BH NOTE
Weekend Coverage:      CC:\"I am about the same\"      Mood: depressed  Affect:blunted  Sleep reports sleeping about 2-3 hours last night. He reports that he is having a hard time with the voices. Mr. Harmon reports that the voices are trying to make him convince the staff to think that he is okay so that he can discharge and jump in front of a car. Mr Harmon reports that he attempted suicide three months ago via overdose. Mr. Harmon reports that he is taking the same medications now that he was taking before but he feels that there is nothing that anyone can do about it  Appetite:good, reports that he is forcing himself to eat.   SI/HI:SI: He reports that he s having a hard time with the voices. Mr. Harmon reports that the voices are trying to make him convince the staff to think that he is okay so that he can discharge and jump in front of a car. Mr Harmon reports that he attempted suicide three months ago via overdose. Mr. Harmon reports that he is taking the same medications now that he was taking before but he feels that there is nothing that anyone can do about it. Denies HI.  AVH:Reports VH, spots and shadows as well as people who should not be living anymore. Reports CAH.  Thought process: organized, able to articulate his thoughts, goal-oriented.   Nurse reports that Benitez has been isolative but communicates with nurses and contracts for safety.    Plan: continue current treatment.

## 2024-10-13 NOTE — GROUP NOTE
Group Therapy Note    Date: 10/12/2024    Group Start Time: 2000  Group End Time: 2030  Group Topic: Community Meeting    [facility]    Phyllis Olivera        Group Therapy Note It was just Manuel out for Wrap-Up Group this evening.    Attendees: 1           Notes:  Benitez was not out for Wrap - Up Group this evening.       Signature:  Phyllis Olivera

## 2024-10-13 NOTE — PLAN OF CARE
Problem: Self Harm/Suicidality  Goal: Will have no self-injury during hospital stay  Description: INTERVENTIONS:  1.  Ensure constant observer at bedside with Q15M safety checks  2.  Maintain a safe environment  3.  Secure patient belongings  4.  Ensure family/visitors adhere to safety recommendations  5.  Ensure safety tray has been added to patient's diet order  6.  Every shift and PRN: Re-assess suicidal risk via Frequent Screener    10/13/2024 0904 by Marleen Shay RN  Outcome: Progressing     Problem: Coping  Goal: Pt/Family able to verbalize concerns and demonstrate effective coping strategies  Description: INTERVENTIONS:  1. Assist patient/family to identify coping skills, available support systems and cultural and spiritual values  2. Provide emotional support, including active listening and acknowledgement of concerns of patient and caregivers  3. Reduce environmental stimuli, as able  4. Instruct patient/family in relaxation techniques, as appropriate  5. Assess for spiritual pain/suffering and initiate Spiritual Care, Psychosocial Clinical Specialist consults as needed  10/13/2024 0904 by Marleen Shay, RN  Outcome: Progressing

## 2024-10-14 PROCEDURE — 1240000000 HC EMOTIONAL WELLNESS R&B

## 2024-10-14 PROCEDURE — 99232 SBSQ HOSP IP/OBS MODERATE 35: CPT | Performed by: PSYCHIATRY & NEUROLOGY

## 2024-10-14 PROCEDURE — 6370000000 HC RX 637 (ALT 250 FOR IP): Performed by: PSYCHIATRY & NEUROLOGY

## 2024-10-14 RX ADMIN — GUAIFENESIN SYRUP AND DEXTROMETHORPHAN 10 ML: 100; 10 SYRUP ORAL at 18:42

## 2024-10-14 RX ADMIN — TOPIRAMATE 50 MG: 25 TABLET, FILM COATED ORAL at 08:19

## 2024-10-14 RX ADMIN — PANTOPRAZOLE SODIUM 40 MG: 40 TABLET, DELAYED RELEASE ORAL at 15:30

## 2024-10-14 RX ADMIN — QUETIAPINE FUMARATE 800 MG: 200 TABLET ORAL at 20:17

## 2024-10-14 RX ADMIN — ACETAMINOPHEN 650 MG: 325 TABLET ORAL at 10:04

## 2024-10-14 RX ADMIN — TOPIRAMATE 50 MG: 25 TABLET, FILM COATED ORAL at 20:18

## 2024-10-14 RX ADMIN — SERTRALINE 200 MG: 100 TABLET, FILM COATED ORAL at 08:19

## 2024-10-14 RX ADMIN — HALOPERIDOL 15 MG: 5 TABLET ORAL at 08:18

## 2024-10-14 RX ADMIN — PANTOPRAZOLE SODIUM 40 MG: 40 TABLET, DELAYED RELEASE ORAL at 07:54

## 2024-10-14 RX ADMIN — HALOPERIDOL 15 MG: 5 TABLET ORAL at 20:17

## 2024-10-14 RX ADMIN — TRAZODONE HYDROCHLORIDE 200 MG: 100 TABLET ORAL at 20:18

## 2024-10-14 RX ADMIN — TOPIRAMATE 50 MG: 25 TABLET, FILM COATED ORAL at 15:25

## 2024-10-14 RX ADMIN — ALUMINUM HYDROXIDE, MAGNESIUM HYDROXIDE, AND SIMETHICONE 30 ML: 1200; 120; 1200 SUSPENSION ORAL at 23:47

## 2024-10-14 ASSESSMENT — PAIN DESCRIPTION - DESCRIPTORS
DESCRIPTORS: ACHING
DESCRIPTORS: ACHING

## 2024-10-14 ASSESSMENT — PAIN SCALES - WONG BAKER
WONGBAKER_NUMERICALRESPONSE: HURTS LITTLE MORE
WONGBAKER_NUMERICALRESPONSE: HURTS EVEN MORE
WONGBAKER_NUMERICALRESPONSE: NO HURT

## 2024-10-14 ASSESSMENT — PAIN SCALES - GENERAL
PAINLEVEL_OUTOF10: 8
PAINLEVEL_OUTOF10: 6
PAINLEVEL_OUTOF10: 0
PAINLEVEL_OUTOF10: 0

## 2024-10-14 ASSESSMENT — PAIN DESCRIPTION - LOCATION: LOCATION: HEAD

## 2024-10-14 NOTE — PROGRESS NOTES
Spiritual Health History and Assessment/Progress Note  [facility]    Initial Encounter,  ,  , Initial Encounter    Name: Benitez Harmon MRN: [id number]    Age: 54 y.o.     Sex: male   Language: English   Adventist: Other   Schizoaffective disorder, bipolar type (HCC)     Date: 10/14/2024            Total Time Calculated: 44 min              Spiritual Assessment began in [facility]        Referral/Consult From: Nurse   Encounter Overview/Reason: Initial Encounter  Service Provided For: Patient    Darby, Belief, Meaning:   Patient identifies as spiritual  Family/Friends No family/friends present      Importance and Influence:  Patient has spiritual/personal beliefs that influence decisions regarding their health  Family/Friends No family/friends present    Community:  Patient feels well-supported. Support system includes: Parent/s and Extended family  Family/Friends No family/friends present    Assessment and Plan of Care:     Patient Interventions include: Facilitated expression of thoughts and feelings  Family/Friends Interventions include: No family/friends present    Patient Plan of Care: Spiritual Care available upon further referral  Family/Friends Plan of Care: No family/friends present    Electronically signed by Chaplain Dickson on 10/14/2024 at 12:08 PM

## 2024-10-14 NOTE — BH NOTE
Behavioral Health Interdisciplinary Rounds     Patient Name: Benitez Harmon  Age: 54 y.o.  Room/Bed:  230/01  Primary Diagnosis: Schizoaffective disorder, bipolar type (HCC)   Admission Status: Voluntary     Readmission within 30 days: No  Power of  in place: No  Patient requires a blocked bed: No14}          Reason for blocked bed:     Sleep hours: 7.25       Participation in Care/Groups:  Yes  Medication Compliant?: Yes  PRNS (last 24 hours): Pain    Restraints (last 24 hours):  No  Substance Abuse:  Yes    24 hour chart check complete: Yes    Patient goal(s) for today: to call [facility]  Treatment team focus/goals: Will have no self-injury during hospital stay   Progress note: patient wants to go back to inpatient rehab    LOS:  5  Expected LOS: 3-6    Financial concerns/prescription coverage:  No  Family contact: no      Family requesting physician contact today:  No  Discharge plan: return home  Access to weapons: No       Outpatient provider(s): patient reports being connected to a provider in [place] but is unable to recall name  Patient's preferred phone number for follow up call: [phone]     Participating treatment team members: Benitez Harmon,Brigid Duque, Annette Duque, Veronica Lucero, Dr. Bedoya (assigned SW), Veronica Lucero

## 2024-10-14 NOTE — BH NOTE
Affect blunt Mood depressed A&O X4. Patient reports that he still hearing voices commanding him to commit suicide. He however denies HI. He complained about stabbing pain in the head and lower abdomen @8 on a scale of 0-10. He spent most hours in bed sleeping, he was compliant with his meds, cooperative and calm. Appetite good, his intake of food was 80%, he however doesn't like drinking water.    PRN Medication Documentation     Specific patient behavior that led to the need for PRN medication: headache and lower abdominal pain  Staff interventions attempted prior to PRN being given: patient requested  PRN medication given: Tylenol 650 mg PO @ 1004hrs  Patient responsiveness/effectiveness of PRN medication: pt stated his pain had decreased

## 2024-10-14 NOTE — PLAN OF CARE
Problem: Discharge Planning  Goal: Discharge to home or other facility with appropriate resources  Outcome: Progressing  Flowsheets (Taken 10/14/2024 0831)  Discharge to home or other facility with appropriate resources: Identify discharge learning needs (meds, wound care, etc)     Problem: Self Harm/Suicidality  Goal: Will have no self-injury during hospital stay  Description: INTERVENTIONS:  1.  Ensure constant observer at bedside with Q15M safety checks  2.  Maintain a safe environment  3.  Secure patient belongings  4.  Ensure family/visitors adhere to safety recommendations  5.  Ensure safety tray has been added to patient's diet order  6.  Every shift and PRN: Re-assess suicidal risk via Frequent Screener    Outcome: Progressing     Problem: Pain  Goal: Verbalizes/displays adequate comfort level or baseline comfort level  Outcome: Progressing     Problem: Chronic Conditions and Co-morbidities  Goal: Patient's chronic conditions and co-morbidity symptoms are monitored and maintained or improved  Outcome: Progressing  Flowsheets (Taken 10/14/2024 0831)  Care Plan - Patient's Chronic Conditions and Co-Morbidity Symptoms are Monitored and Maintained or Improved: Monitor and assess patient's chronic conditions and comorbid symptoms for stability, deterioration, or improvement     Problem: Depression/Self Harm  Goal: Effect of psychiatric condition will be minimized and patient will be protected from self harm  Description: INTERVENTIONS:  1. Assess impact of patient's symptoms on level of functioning, self care needs and offer support as indicated  2. Assess patient/family knowledge of depression, impact on illness and need for teaching  3. Provide emotional support, presence and reassurance  4. Assess for possible suicidal thoughts or ideation. If patient expresses suicidal thoughts or statements do not leave alone, initiate Suicide Precautions, move to a room close to the nursing station and obtain sitter  5.

## 2024-10-14 NOTE — GROUP NOTE
Group Therapy Note    Date: 10/14/2024    Group Start Time: 0900  Group End Time: 0950  Group Topic: Process Group - Inpatient    [facility]    Liset Lucero, LCSW        Group Therapy Note    Attendees: 3 scheduled    Focus of session was on information on developing effective habits of the mind from the website “habitsofthemind.org.”  We spoke about the different thinking patterns to train your brain to be more effective and better able to manage thoughts, feelings, and behaviors.  We discussed “persisting” and sticking to things and remaining focused until done.  We spoke about “managing impulsivity” and taking our time and thinking before we speak.  We also spoke about “taking responsible risks” and the need to live on the edge of ones competence, plus many other effective habits.  I asked patient to share which habit they would benefit from learning and creating for themselves for their long term recovery.         Patient's Goal:  Will have no self-injury during hospital stay      Notes:  Benitez participated in group with prompting.  He stated he had a migraine and was struggling but he stayed throughout group and participated minimally. He spoke about how he knows that he needs help and support and needs to find effective ways to manage his mood and his thinking patterns.      Status After Intervention:  Unchanged    Participation Level: Active Listener and Interactive    Participation Quality: Appropriate, Attentive, Sharing, and Supportive      Speech:  normal      Thought Process/Content: Logical  Linear      Affective Functioning: Congruent and Flat      Mood: depressed      Level of consciousness:  Alert, Oriented x4, Attentive, and Preoccupied      Response to Learning: Able to verbalize current knowledge/experience, Able to retain information, and Capable of insight      Endings: None Reported    Modes of Intervention:

## 2024-10-14 NOTE — PROGRESS NOTES
INTERVAL Hx:  Records and clinical information from the weekend were reviewed. States he's still not feeling well, both physically (URI/bronchitis) as well as psychiatrically (psychosis and some dysphoria). He states the 's that tell him to \"sign out\" of the hospital so he can kill himself as well as him being worthless. Continues to beat himself up due to relapsing so quickly after 2 months in Rehab. He wants to go to the [facility] in [place] for further inpatient SA Tx as he doesn't trust himself to stay sober. Despite the AH's, he denies any true SI or intent now. He's not having any other obvious positive psychotic symptoms--no apparent delusions or overt thought disorganization. Tolerating the meds well--no sedation, EPSE's, or worsened TD symptoms. His facial grimaces seem to be less frequent today, and seem to occur mostly when he's trying to talk. Will continue the current regimen given how stable he'd been PTA, and the dosages he's taking. Slept 7.25 hours last night.    MEDS:  Current Facility-Administered Medications   Medication Dose Route Frequency    pantoprazole (PROTONIX) tablet 40 mg  40 mg Oral BID AC    haloperidol (HALDOL) tablet 15 mg  15 mg Oral BID    QUEtiapine (SEROQUEL) tablet 800 mg  800 mg Oral QHS    sertraline (ZOLOFT) tablet 200 mg  200 mg Oral Daily    topiramate (TOPAMAX) tablet 50 mg  50 mg Oral TID    traZODone (DESYREL) tablet 200 mg  200 mg Oral Nightly    haloperidol (HALDOL) tablet 5 mg  5 mg Oral Q6H PRN    HYDROcodone-chlorpheniramine (TUSSIONEX) 10-8 MG/5ML oral suspension 5 mL  5 mL Oral BID PRN    acetaminophen (TYLENOL) tablet 650 mg  650 mg Oral Q4H PRN    hydrOXYzine HCl (ATARAX) tablet 50 mg  50 mg Oral TID PRN    polyethylene glycol (GLYCOLAX) packet 17 g  17 g Oral Daily PRN    nicotine (NICODERM CQ) 14 MG/24HR 1 patch  1 patch TransDERmal Daily    influenza tiss-cult vaccine (FLUCELVAX) injection 0.5 mL  0.5 mL IntraMUSCular Prior to discharge    aluminum &

## 2024-10-14 NOTE — PLAN OF CARE
Problem: Discharge Planning  Goal: Discharge to home or other facility with appropriate resources  10/14/2024 1937 by Ant Ray RN  Outcome: Progressing     Problem: Self Harm/Suicidality  Goal: Will have no self-injury during hospital stay  Description: INTERVENTIONS:  1.  Ensure constant observer at bedside with Q15M safety checks  2.  Maintain a safe environment  3.  Secure patient belongings  4.  Ensure family/visitors adhere to safety recommendations  5.  Ensure safety tray has been added to patient's diet order  6.  Every shift and PRN: Re-assess suicidal risk via Frequent Screener    10/14/2024 1937 by Ant Ray, RN  Outcome: Progressing     Problem: Pain  Goal: Verbalizes/displays adequate comfort level or baseline comfort level  10/14/2024 1937 by Ant Ray RN  Outcome: Progressing     Problem: ABCDS Injury Assessment  Goal: Absence of physical injury  Outcome: Progressing     Problem: Chronic Conditions and Co-morbidities  Goal: Patient's chronic conditions and co-morbidity symptoms are monitored and maintained or improved  10/14/2024 1937 by Ant Ray RN  Outcome: Progressing     Problem: Anxiety  Goal: Will report anxiety at manageable levels  Description: INTERVENTIONS:  1. Administer medication as ordered  2. Teach and rehearse alternative coping skills  3. Provide emotional support with 1:1 interaction with staff  Outcome: Progressing     Problem: Coping  Goal: Pt/Family able to verbalize concerns and demonstrate effective coping strategies  Description: INTERVENTIONS:  1. Assist patient/family to identify coping skills, available support systems and cultural and spiritual values  2. Provide emotional support, including active listening and acknowledgement of concerns of patient and caregivers  3. Reduce environmental stimuli, as able  4. Instruct patient/family in relaxation techniques, as appropriate  5. Assess for spiritual pain/suffering and initiate Spiritual  behavior (including use of illegal substances)  2. Notify security of behavior or suspected illegal substances which indicate the need for search of the family and/or belongings  3. Encourage verbalization of thoughts and concerns in a socially appropriate manner  4. Utilize positive, consistent limit setting strategies supporting safety of patient, staff and others  5. Encourage participation in the decision making process about the behavioral management agreement  6. If a visitor's behavior poses a threat to safety call refer to organization policy.  7. Initiate consult with , Psychosocial CNS, Spiritual Care as appropriate  Outcome: Progressing     Problem: Depression/Self Harm  Goal: Effect of psychiatric condition will be minimized and patient will be protected from self harm  Description: INTERVENTIONS:  1. Assess impact of patient's symptoms on level of functioning, self care needs and offer support as indicated  2. Assess patient/family knowledge of depression, impact on illness and need for teaching  3. Provide emotional support, presence and reassurance  4. Assess for possible suicidal thoughts or ideation. If patient expresses suicidal thoughts or statements do not leave alone, initiate Suicide Precautions, move to a room close to the nursing station and obtain sitter  5. Initiate consults as appropriate with Mental Health Professional, Spiritual Care, Psychosocial CNS, and consider a recommendation to the LIP for a Psychiatric Consultation  10/14/2024 1937 by Ant Ray, RN  Outcome: Progressing     Problem: Drug Abuse/Detox  Goal: Will have no detox symptoms and will verbalize plan for changing drug-related behavior  Description: INTERVENTIONS:  1. Administer medication as ordered  2. Monitor physical status  3. Provide emotional support with 1:1 interaction with staff  4. Encourage  recovery focused treatment   Outcome: Progressing     Problem: Neurosensory - Adult  Goal: Achieves

## 2024-10-14 NOTE — PLAN OF CARE
Benitez still having anxiety which he rates as 9/10 and feelings of depression which he rates as 10/10.  Still having AVH.  States voices are telling him to talk his way out of \"here\" and than go jump in front of a car because he is a failure.  Benitez feels he is a failure due to relapsing after being out of rehab for 2 days.  Educated him not to give up on himself.  That alcohol/drug abuse can be hard to get through.  That it takes some people several inpatient admission before they are able to stay clean.  Important thing is not to give up.

## 2024-10-14 NOTE — BH NOTE
Patient is interested in going back to an inpatient rehab for SA. He wants to try [facility]. Number given to patient along with his insurance information so he can call. Patient prefers to do this versus an outpatient IOP. He feels he does not trust himself to stay sober. Patient reports not physically feeling well and is still endorsing AH that are telling him he is worthless. He does not participate in any groups.

## 2024-10-15 PROCEDURE — 6370000000 HC RX 637 (ALT 250 FOR IP): Performed by: PSYCHIATRY & NEUROLOGY

## 2024-10-15 PROCEDURE — 1240000000 HC EMOTIONAL WELLNESS R&B

## 2024-10-15 PROCEDURE — 6370000000 HC RX 637 (ALT 250 FOR IP): Performed by: INTERNAL MEDICINE

## 2024-10-15 PROCEDURE — 6370000000 HC RX 637 (ALT 250 FOR IP): Performed by: HOSPITALIST

## 2024-10-15 PROCEDURE — 99232 SBSQ HOSP IP/OBS MODERATE 35: CPT | Performed by: PSYCHIATRY & NEUROLOGY

## 2024-10-15 RX ORDER — ONDANSETRON 4 MG/1
4 TABLET, ORALLY DISINTEGRATING ORAL EVERY 4 HOURS PRN
Status: DISCONTINUED | OUTPATIENT
Start: 2024-10-15 | End: 2024-10-25 | Stop reason: HOSPADM

## 2024-10-15 RX ADMIN — TOPIRAMATE 50 MG: 25 TABLET, FILM COATED ORAL at 08:20

## 2024-10-15 RX ADMIN — ACETAMINOPHEN 650 MG: 325 TABLET ORAL at 11:50

## 2024-10-15 RX ADMIN — ONDANSETRON 4 MG: 4 TABLET, ORALLY DISINTEGRATING ORAL at 20:15

## 2024-10-15 RX ADMIN — QUETIAPINE FUMARATE 800 MG: 200 TABLET ORAL at 20:42

## 2024-10-15 RX ADMIN — TOPIRAMATE 50 MG: 25 TABLET, FILM COATED ORAL at 20:42

## 2024-10-15 RX ADMIN — ONDANSETRON 4 MG: 4 TABLET, ORALLY DISINTEGRATING ORAL at 01:14

## 2024-10-15 RX ADMIN — HYDROCODONE POLISTIREX AND CHLORPHENIRAMINE POLISTIREX 5 ML: 10; 8 SUSPENSION, EXTENDED RELEASE ORAL at 00:27

## 2024-10-15 RX ADMIN — HALOPERIDOL 15 MG: 5 TABLET ORAL at 08:20

## 2024-10-15 RX ADMIN — SERTRALINE 200 MG: 100 TABLET, FILM COATED ORAL at 08:20

## 2024-10-15 RX ADMIN — ACETAMINOPHEN 650 MG: 325 TABLET ORAL at 00:27

## 2024-10-15 RX ADMIN — HALOPERIDOL 15 MG: 5 TABLET ORAL at 20:42

## 2024-10-15 RX ADMIN — PANTOPRAZOLE SODIUM 40 MG: 40 TABLET, DELAYED RELEASE ORAL at 06:36

## 2024-10-15 RX ADMIN — PANTOPRAZOLE SODIUM 40 MG: 40 TABLET, DELAYED RELEASE ORAL at 15:44

## 2024-10-15 RX ADMIN — TRAZODONE HYDROCHLORIDE 200 MG: 100 TABLET ORAL at 20:42

## 2024-10-15 RX ADMIN — ONDANSETRON 4 MG: 4 TABLET, ORALLY DISINTEGRATING ORAL at 11:50

## 2024-10-15 RX ADMIN — TOPIRAMATE 50 MG: 25 TABLET, FILM COATED ORAL at 15:44

## 2024-10-15 ASSESSMENT — PAIN DESCRIPTION - DESCRIPTORS: DESCRIPTORS: ACHING

## 2024-10-15 ASSESSMENT — PAIN - FUNCTIONAL ASSESSMENT: PAIN_FUNCTIONAL_ASSESSMENT: ACTIVITIES ARE NOT PREVENTED

## 2024-10-15 ASSESSMENT — PAIN DESCRIPTION - LOCATION: LOCATION: ABDOMEN

## 2024-10-15 ASSESSMENT — PAIN SCALES - GENERAL
PAINLEVEL_OUTOF10: 4
PAINLEVEL_OUTOF10: 0
PAINLEVEL_OUTOF10: 2
PAINLEVEL_OUTOF10: 8

## 2024-10-15 ASSESSMENT — PAIN SCALES - WONG BAKER: WONGBAKER_NUMERICALRESPONSE: HURTS A LITTLE BIT

## 2024-10-15 NOTE — PLAN OF CARE
Problem: Discharge Planning  Goal: Discharge to home or other facility with appropriate resources  Outcome: Progressing     Problem: Self Harm/Suicidality  Goal: Will have no self-injury during hospital stay  Description: INTERVENTIONS:  1.  Ensure constant observer at bedside with Q15M safety checks  2.  Maintain a safe environment  3.  Secure patient belongings  4.  Ensure family/visitors adhere to safety recommendations  5.  Ensure safety tray has been added to patient's diet order  6.  Every shift and PRN: Re-assess suicidal risk via Frequent Screener    Outcome: Progressing     Problem: Pain  Goal: Verbalizes/displays adequate comfort level or baseline comfort level  Outcome: Progressing     Problem: ABCDS Injury Assessment  Goal: Absence of physical injury  Outcome: Progressing     Problem: Chronic Conditions and Co-morbidities  Goal: Patient's chronic conditions and co-morbidity symptoms are monitored and maintained or improved  Outcome: Progressing     Problem: Anxiety  Goal: Will report anxiety at manageable levels  Description: INTERVENTIONS:  1. Administer medication as ordered  2. Teach and rehearse alternative coping skills  3. Provide emotional support with 1:1 interaction with staff  Outcome: Progressing     Problem: Coping  Goal: Pt/Family able to verbalize concerns and demonstrate effective coping strategies  Description: INTERVENTIONS:  1. Assist patient/family to identify coping skills, available support systems and cultural and spiritual values  2. Provide emotional support, including active listening and acknowledgement of concerns of patient and caregivers  3. Reduce environmental stimuli, as able  4. Instruct patient/family in relaxation techniques, as appropriate  5. Assess for spiritual pain/suffering and initiate Spiritual Care, Psychosocial Clinical Specialist consults as needed  Outcome: Progressing     Problem: Decision Making  Goal: Pt/Family able to effectively weigh alternatives and

## 2024-10-15 NOTE — PROGRESS NOTES
Spiritual Health History and Assessment/Progress Note  [facility]    Spiritual/Emotional Needs, Behavioral Health, Attempted Encounter, Follow-up,  ,  , Follow up     Name: Benitez Harmon MRN: [id number]    Age: 54 y.o.     Sex: male   Language: English   Adventist: Other   Schizoaffective disorder, bipolar type ([facility])     Date: 10/15/2024            Total Time Calculated: 6 min              Spiritual Assessment continued in [facility] BE[facility]AVIORAL [facility]        Referral/Consult From: Rounding   Encounter Overview/Reason: Spiritual/Emotional Needs, Behavioral Health, Attempted Encounter, Follow-up  Service Provided For: Patient    Darby, Belief, Meaning:   Patient unable to assess at this time  Family/Friends No family/friends present      Importance and Influence:  Patient unable to assess at this time  Family/Friends No family/friends present    Community:  Patient Other: Unable to access.  Family/Friends No family/friends present    Assessment and Plan of Care:     Patient Interventions include: Other: Patient was asleep; Left cares-We're Here For You and prayer card for healing.  Family/Friends Interventions include: No family/friends present    Patient Plan of Care: Spiritual Care available upon further referral  Family/Friends Plan of Care: No family/friends present    Electronically signed by Chaplain Dickson on 10/15/2024 at 12:12 PM

## 2024-10-15 NOTE — PLAN OF CARE
Problem: Discharge Planning  Goal: Discharge to home or other facility with appropriate resources  10/15/2024 1921 by Ant Ray RN  Outcome: Progressing     Problem: Self Harm/Suicidality  Goal: Will have no self-injury during hospital stay  Description: INTERVENTIONS:  1.  Ensure constant observer at bedside with Q15M safety checks  2.  Maintain a safe environment  3.  Secure patient belongings  4.  Ensure family/visitors adhere to safety recommendations  5.  Ensure safety tray has been added to patient's diet order  6.  Every shift and PRN: Re-assess suicidal risk via Frequent Screener    10/15/2024 1921 by Ant Ray RN  Outcome: Progressing     Problem: Pain  Goal: Verbalizes/displays adequate comfort level or baseline comfort level  10/15/2024 1921 by Ant Ray RN  Outcome: Progressing     Problem: ABCDS Injury Assessment  Goal: Absence of physical injury  10/15/2024 1921 by Ant Ray RN  Outcome: Progressing     Problem: Chronic Conditions and Co-morbidities  Goal: Patient's chronic conditions and co-morbidity symptoms are monitored and maintained or improved  10/15/2024 1921 by Ant Ray RN  Outcome: Progressing     Problem: Anxiety  Goal: Will report anxiety at manageable levels  Description: INTERVENTIONS:  1. Administer medication as ordered  2. Teach and rehearse alternative coping skills  3. Provide emotional support with 1:1 interaction with staff  10/15/2024 1921 by Ant Ray RN  Outcome: Progressing     Problem: Coping  Goal: Pt/Family able to verbalize concerns and demonstrate effective coping strategies  Description: INTERVENTIONS:  1. Assist patient/family to identify coping skills, available support systems and cultural and spiritual values  2. Provide emotional support, including active listening and acknowledgement of concerns of patient and caregivers  3. Reduce environmental stimuli, as able  4. Instruct patient/family in relaxation techniques,

## 2024-10-15 NOTE — GROUP NOTE
Group Therapy Note    Date: 10/15/2024    Pt was not feeling well and did not attend group.  I will continue to encourage his participation in the future       Signature:  Ibeth Taylor LCSW

## 2024-10-15 NOTE — PROGRESS NOTES
INTERVAL Hx:  Records and clinical information were reviewed. States he's feeling a little better physically, but still not fully well. He's still tired and still coughing, but it's much less severe now. Mood is still low, but not as hopeless as it was PTA. However, he's still hearing the AH's, and he states they're just as strong and bothersome. Discussed the Tx plan and he'll be calling the [facility] Tx program to see about transitioning there--doesn't trust himself to stay clean/sober yet. Despite the 's telling him to harm himself, he denies any true SI or intent now. He's not having any other obvious positive psychotic symptoms--no apparent delusions or overt thought disorganization. Tolerating the meds well--no sedation, EPSE's, or worsened TD symptoms. His facial grimaces seem to be much less frequent overall, and seem to occur mostly when he's trying to talk. Will continue the current regimen given how stable he'd been PTA, and the dosages he's taking. Slept 4.5 hours last night.    MEDS:  Current Facility-Administered Medications   Medication Dose Route Frequency    ondansetron (ZOFRAN-ODT) disintegrating tablet 4 mg  4 mg Oral Q4H PRN    pantoprazole (PROTONIX) tablet 40 mg  40 mg Oral BID AC    haloperidol (HALDOL) tablet 15 mg  15 mg Oral BID    QUEtiapine (SEROQUEL) tablet 800 mg  800 mg Oral QHS    sertraline (ZOLOFT) tablet 200 mg  200 mg Oral Daily    topiramate (TOPAMAX) tablet 50 mg  50 mg Oral TID    traZODone (DESYREL) tablet 200 mg  200 mg Oral Nightly    haloperidol (HALDOL) tablet 5 mg  5 mg Oral Q6H PRN    acetaminophen (TYLENOL) tablet 650 mg  650 mg Oral Q4H PRN    hydrOXYzine HCl (ATARAX) tablet 50 mg  50 mg Oral TID PRN    polyethylene glycol (GLYCOLAX) packet 17 g  17 g Oral Daily PRN    nicotine (NICODERM CQ) 14 MG/24HR 1 patch  1 patch TransDERmal Daily    influenza tiss-cult vaccine (FLUCELVAX) injection 0.5 mL  0.5 mL IntraMUSCular Prior to discharge    aluminum & magnesium

## 2024-10-15 NOTE — BH NOTE
Affect blunt, mood depressed. Patient a & o x 4. C/O  pain see note below. No SI, HI, AVH. Patient tolerated some medications well, cooperative. Patient is emesis x 4 between day shift and night shift. Patient was given several prn medications without effectiveness. On call provider was notified with new order for Zofran 4mg. Currently sleeping during the time of this note.    PRN Medication Documentation  Specific patient behavior that led to the need for PRN medication: Indigestion  Staff interventions attempted prior to PRN being given: Patient Requested  PRN medication given: Maalox 30ml at 2347  Patient responsiveness/effectiveness of PRN medication: Tolerated Well, Ineffective    PRN Medication Documentation  Specific patient behavior that led to the need for PRN medication: Pain  Staff interventions attempted prior to PRN being given: Physical Assessment/Discussed Treatment Optons  PRN medication given: Acetaminophen 650mg at 0027  Patient responsiveness/effectiveness of PRN medication: Tolerated Well, Ineffective    PRN Medication Documentation  Specific patient behavior that led to the need for PRN medication: Persistent Cough  Staff interventions attempted prior to PRN being given: Physical Assessment/Discussed Treatment Options  PRN medication given: Tussionex 5ml at 0027  Patient responsiveness/effectiveness of PRN medication: Tolerated Well, Ineffective      PRN Medication Documentation  Specific patient behavior that led to the need for PRN medication: Nausea/Vomiting  Staff interventions attempted prior to PRN being given: Physical Assesment/Discussed treatment option  PRN medication given: Zofran 4mg at 0114  Patient responsiveness/effectiveness of PRN medication: Tolerated well, effective    Slept 7h 15min

## 2024-10-15 NOTE — BH NOTE
Affect constricted, mood depressed/withdrawn. Alert and oriented X 4. Cooperative and pleasant. Pt did not Attend group. Interacted with staff. Makes needs known. Pt withdrawn only coming out of room to eat. Pt has SI with no intent. Denies HI. Pt states he still is experiencing AH with the \"voices\" telling him to harm himself.  Medication compliant. Pt c/o pain in abdomen and nausea. No reports of any episodes of emesis during this shift. Hospitalist made aware of pt complaints, new orders received for CBC and BMP.    PRN Medication Documentation     Specific patient behavior that led to the need for PRN medication: pain  Staff interventions attempted prior to PRN being given: patient requested  PRN medication given: Tylenol 650 mg by mouth at 1150  Patient responsiveness/effectiveness of PRN medication: pt stated his pain has decreased    PRN Medication Documentation     Specific patient behavior that led to the need for PRN medication: nausea  Staff interventions attempted prior to PRN being given: patient requested  PRN medication given: Zofran 4 mg @ 1150  Patient responsiveness/effectiveness of PRN medication: pt stated not as nauseous.

## 2024-10-16 LAB
ANION GAP SERPL CALC-SCNC: 8 MMOL/L (ref 2–12)
BASOPHILS # BLD: 0 K/UL (ref 0–0.1)
BASOPHILS NFR BLD: 1 % (ref 0–1)
BUN SERPL-MCNC: 20 MG/DL (ref 6–20)
BUN/CREAT SERPL: 18 (ref 12–20)
CALCIUM SERPL-MCNC: 9 MG/DL (ref 8.5–10.1)
CHLORIDE SERPL-SCNC: 108 MMOL/L (ref 97–108)
CO2 SERPL-SCNC: 25 MMOL/L (ref 21–32)
CREAT SERPL-MCNC: 1.13 MG/DL (ref 0.7–1.3)
DIFFERENTIAL METHOD BLD: ABNORMAL
EOSINOPHIL # BLD: 0.4 K/UL (ref 0–0.4)
EOSINOPHIL NFR BLD: 9 % (ref 0–7)
ERYTHROCYTE [DISTWIDTH] IN BLOOD BY AUTOMATED COUNT: 19.6 % (ref 11.5–14.5)
GLUCOSE SERPL-MCNC: 103 MG/DL (ref 65–100)
HCT VFR BLD AUTO: 33.3 % (ref 36.6–50.3)
HGB BLD-MCNC: 9.4 G/DL (ref 12.1–17)
IMM GRANULOCYTES # BLD AUTO: 0 K/UL (ref 0–0.04)
IMM GRANULOCYTES NFR BLD AUTO: 0 % (ref 0–0.5)
LYMPHOCYTES # BLD: 1.6 K/UL (ref 0.8–3.5)
LYMPHOCYTES NFR BLD: 34 % (ref 12–49)
MCH RBC QN AUTO: 23.4 PG (ref 26–34)
MCHC RBC AUTO-ENTMCNC: 28.2 G/DL (ref 30–36.5)
MCV RBC AUTO: 82.8 FL (ref 80–99)
MONOCYTES # BLD: 0.5 K/UL (ref 0–1)
MONOCYTES NFR BLD: 10 % (ref 5–13)
NEUTS SEG # BLD: 2.1 K/UL (ref 1.8–8)
NEUTS SEG NFR BLD: 46 % (ref 32–75)
NRBC # BLD: 0 K/UL (ref 0–0.01)
NRBC BLD-RTO: 0 PER 100 WBC
PLATELET # BLD AUTO: 329 K/UL (ref 150–400)
PLATELET COMMENT: ABNORMAL
PMV BLD AUTO: 9.6 FL (ref 8.9–12.9)
POTASSIUM SERPL-SCNC: 4 MMOL/L (ref 3.5–5.1)
RBC # BLD AUTO: 4.02 M/UL (ref 4.1–5.7)
RBC MORPH BLD: ABNORMAL
SODIUM SERPL-SCNC: 141 MMOL/L (ref 136–145)
WBC # BLD AUTO: 4.6 K/UL (ref 4.1–11.1)

## 2024-10-16 PROCEDURE — 36415 COLL VENOUS BLD VENIPUNCTURE: CPT

## 2024-10-16 PROCEDURE — 6370000000 HC RX 637 (ALT 250 FOR IP): Performed by: HOSPITALIST

## 2024-10-16 PROCEDURE — 80048 BASIC METABOLIC PNL TOTAL CA: CPT

## 2024-10-16 PROCEDURE — 85025 COMPLETE CBC W/AUTO DIFF WBC: CPT

## 2024-10-16 PROCEDURE — 6370000000 HC RX 637 (ALT 250 FOR IP): Performed by: PSYCHIATRY & NEUROLOGY

## 2024-10-16 PROCEDURE — 1240000000 HC EMOTIONAL WELLNESS R&B

## 2024-10-16 PROCEDURE — 99232 SBSQ HOSP IP/OBS MODERATE 35: CPT | Performed by: PSYCHIATRY & NEUROLOGY

## 2024-10-16 RX ORDER — SERTRALINE HYDROCHLORIDE 100 MG/1
100 TABLET, FILM COATED ORAL DAILY
Status: DISCONTINUED | OUTPATIENT
Start: 2024-10-17 | End: 2024-10-21

## 2024-10-16 RX ORDER — FLUOXETINE 10 MG/1
10 CAPSULE ORAL DAILY
Status: DISCONTINUED | OUTPATIENT
Start: 2024-10-16 | End: 2024-10-18

## 2024-10-16 RX ADMIN — ONDANSETRON 4 MG: 4 TABLET, ORALLY DISINTEGRATING ORAL at 08:20

## 2024-10-16 RX ADMIN — TOPIRAMATE 50 MG: 25 TABLET, FILM COATED ORAL at 08:20

## 2024-10-16 RX ADMIN — TOPIRAMATE 50 MG: 25 TABLET, FILM COATED ORAL at 20:42

## 2024-10-16 RX ADMIN — HALOPERIDOL 15 MG: 5 TABLET ORAL at 20:41

## 2024-10-16 RX ADMIN — SERTRALINE 200 MG: 100 TABLET, FILM COATED ORAL at 08:20

## 2024-10-16 RX ADMIN — TRAZODONE HYDROCHLORIDE 200 MG: 100 TABLET ORAL at 20:42

## 2024-10-16 RX ADMIN — TOPIRAMATE 50 MG: 25 TABLET, FILM COATED ORAL at 15:40

## 2024-10-16 RX ADMIN — ALUMINUM HYDROXIDE, MAGNESIUM HYDROXIDE, AND SIMETHICONE 30 ML: 1200; 120; 1200 SUSPENSION ORAL at 18:17

## 2024-10-16 RX ADMIN — PANTOPRAZOLE SODIUM 40 MG: 40 TABLET, DELAYED RELEASE ORAL at 15:40

## 2024-10-16 RX ADMIN — HALOPERIDOL 15 MG: 5 TABLET ORAL at 08:20

## 2024-10-16 RX ADMIN — FLUOXETINE HYDROCHLORIDE 10 MG: 10 CAPSULE ORAL at 10:13

## 2024-10-16 RX ADMIN — QUETIAPINE FUMARATE 800 MG: 200 TABLET ORAL at 20:41

## 2024-10-16 RX ADMIN — GUAIFENESIN SYRUP AND DEXTROMETHORPHAN 10 ML: 100; 10 SYRUP ORAL at 20:47

## 2024-10-16 RX ADMIN — PANTOPRAZOLE SODIUM 40 MG: 40 TABLET, DELAYED RELEASE ORAL at 06:03

## 2024-10-16 ASSESSMENT — PAIN SCALES - GENERAL
PAINLEVEL_OUTOF10: 0
PAINLEVEL_OUTOF10: 0

## 2024-10-16 NOTE — BH NOTE
Behavioral Health Interdisciplinary Rounds     Patient Name: Benitez Harmon  Age: 54 y.o.  Room/Bed:  230/01  Primary Diagnosis: Schizoaffective disorder, bipolar type (HCC)   Admission Status: Voluntary     Readmission within 30 days: No  Power of  in place: No  Patient requires a blocked bed: No14}          Reason for blocked bed:     Sleep hours: 6.75       Participation in Care/Groups:  Yes  Medication Compliant?: Yes  PRNS (last 24 hours):  zofran     Restraints (last 24 hours):  No  Substance Abuse:  Yes    24 hour chart check complete: Yes    Patient goal(s) for today: feel better and get back into rehab  Treatment team focus/goals: Discharge to home or other facility with appropriate resources  Progress note: antidepressant will slowly be decreased while a new agent will be added. Patient will be calling a rehab to inquire about treatment    LOS:  7  Expected LOS: 3-5    Financial concerns/prescription  coverage:  No  Family contact: no      Family requesting physician contact today:  No  Discharge plan: rehab for SA  Access to weapons: No       Outpatient provider(s): patient states he was connected but unable to provide information  Patient's preferred phone number for follow up call: [phone]     Participating treatment team members: Benitez Harmon,Dr. Aureliano Castillo, Veronica Lucero (assigned SW), Veronica Lucero

## 2024-10-16 NOTE — GROUP NOTE
Group Therapy Note    Date: 10/16/2024    Benitez was encouraged to come to group today but he refused. He said he was not feeling well again in that \"my stomach and head hurts.\"  Signature:  Liset Lucero [facility]

## 2024-10-16 NOTE — BH NOTE
Rec'd in patient room.  AAOx3.  Disoriented to situation.  Denies SI/HI/AVTH, but has endorsed suicidal thoughts without plan or intent early this shift. Calm and cooperative at present.  Tolerated meds upon med pass.  Zofran 4 mg administered with morning medications due to patient complaint of prior N/V with medication administration.  No episodes of emesis noted.  Medication compliant at present.  Isolative/withdrawn.  Interacts appropriately with staff/peers whenever necessary, but isolates mostly to room.  Able to make needs known.  No s/s of distress noted.  Denies pain/discomfort at present.  Will continue to monitor Q 15 min checks (BHT)/Q 1 hr check (RN).

## 2024-10-16 NOTE — BH NOTE
Met with patient today to see how he was feeling and discuss contacting the rehab facility. He stated he was not able to do this today due to the fact that he does not feel mentally stable. He endorses strong AH, poor mood, and feelings of hopelessness. He is hopeful for the medication change and is fully invested in returning back to a rehab program.He has been calm and cooperative with organized thoughts.

## 2024-10-16 NOTE — PROGRESS NOTES
INTERVAL Hx:  Records and clinical information were reviewed. States he's feeling a little better physically, and objectively that appears to be the case. Still coughing, but not nearly as much, and he feels he's getting stronger, as well. However, the AH's are still strong and his mood is low, all due to feeling guilty and ashamed for relapsing so soon after getting D/C'ed from Rehab. We discussed this in some detail, and he revealed that he's been on Zoloft over 30 years, and I suggested we begin to cross-taper over to Prozac. He's still very invested in going back to a Rehab program, and that he feels he could relapse if not. Not as hopeless as he was PTA, and he denies having any SI now. Discussed the Tx plan and he'll be calling the [facility] Tx program to see about transitioning there. Tolerating the meds well--no sedation, EPSE's, or worsened TD symptoms. His facial grimaces seem to be much less frequent overall, and seem to occur mostly when he's trying to talk. Will start Prozac and begin to taper off the Zoloft. Slept 6.75 hours last night. Labs better--K+: 4.0.    MEDS:  Current Facility-Administered Medications   Medication Dose Route Frequency    [START ON 10/17/2024] sertraline (ZOLOFT) tablet 100 mg  100 mg Oral Daily    FLUoxetine (PROZAC) capsule 10 mg  10 mg Oral Daily    ondansetron (ZOFRAN-ODT) disintegrating tablet 4 mg  4 mg Oral Q4H PRN    pantoprazole (PROTONIX) tablet 40 mg  40 mg Oral BID AC    haloperidol (HALDOL) tablet 15 mg  15 mg Oral BID    QUEtiapine (SEROQUEL) tablet 800 mg  800 mg Oral QHS    topiramate (TOPAMAX) tablet 50 mg  50 mg Oral TID    traZODone (DESYREL) tablet 200 mg  200 mg Oral Nightly    haloperidol (HALDOL) tablet 5 mg  5 mg Oral Q6H PRN    acetaminophen (TYLENOL) tablet 650 mg  650 mg Oral Q4H PRN    hydrOXYzine HCl (ATARAX) tablet 50 mg  50 mg Oral TID PRN    polyethylene glycol (GLYCOLAX) packet 17 g  17 g Oral Daily PRN    nicotine (NICODERM CQ) 14 MG/24HR

## 2024-10-16 NOTE — BH NOTE
Affect blunt, mood depressed. Patient a & o x 4.  Denies pain. No SI, HI, AVH. Patient tolerated medications well, cooperative. Patient did complain of nausea but did not have any emesis episodes. Patient was instructed to sit upright for an hour prior to laying down after eating.  Currently sleeping during the time of this note.    PRN Medication Documentation    Specific patient behavior that led to the need for PRN medication: Nausea  Staff interventions attempted prior to PRN being given: Physical Assessment/Discussed Treatment Options  PRN medication given: Zofran 4mg at 2015  Patient responsiveness/effectiveness of PRN medication: Tolerated Well, Effective    Slept 6h 45min

## 2024-10-17 PROCEDURE — 6370000000 HC RX 637 (ALT 250 FOR IP): Performed by: PSYCHIATRY & NEUROLOGY

## 2024-10-17 PROCEDURE — 1240000000 HC EMOTIONAL WELLNESS R&B

## 2024-10-17 PROCEDURE — 99232 SBSQ HOSP IP/OBS MODERATE 35: CPT | Performed by: PSYCHIATRY & NEUROLOGY

## 2024-10-17 RX ORDER — IBUPROFEN 600 MG/1
600 TABLET, FILM COATED ORAL EVERY 8 HOURS PRN
Status: DISCONTINUED | OUTPATIENT
Start: 2024-10-17 | End: 2024-10-25 | Stop reason: HOSPADM

## 2024-10-17 RX ADMIN — GUAIFENESIN SYRUP AND DEXTROMETHORPHAN 10 ML: 100; 10 SYRUP ORAL at 15:12

## 2024-10-17 RX ADMIN — PANTOPRAZOLE SODIUM 40 MG: 40 TABLET, DELAYED RELEASE ORAL at 15:08

## 2024-10-17 RX ADMIN — POLYETHYLENE GLYCOL 3350 17 G: 17 POWDER, FOR SOLUTION ORAL at 19:50

## 2024-10-17 RX ADMIN — TOPIRAMATE 50 MG: 25 TABLET, FILM COATED ORAL at 08:48

## 2024-10-17 RX ADMIN — PANTOPRAZOLE SODIUM 40 MG: 40 TABLET, DELAYED RELEASE ORAL at 06:44

## 2024-10-17 RX ADMIN — HALOPERIDOL 15 MG: 5 TABLET ORAL at 08:48

## 2024-10-17 RX ADMIN — SERTRALINE 100 MG: 100 TABLET, FILM COATED ORAL at 08:48

## 2024-10-17 RX ADMIN — FLUOXETINE HYDROCHLORIDE 10 MG: 10 CAPSULE ORAL at 08:48

## 2024-10-17 RX ADMIN — ACETAMINOPHEN 650 MG: 325 TABLET ORAL at 15:10

## 2024-10-17 RX ADMIN — QUETIAPINE FUMARATE 800 MG: 200 TABLET ORAL at 20:10

## 2024-10-17 RX ADMIN — IBUPROFEN 600 MG: 600 TABLET, FILM COATED ORAL at 20:11

## 2024-10-17 RX ADMIN — TOPIRAMATE 50 MG: 25 TABLET, FILM COATED ORAL at 20:10

## 2024-10-17 RX ADMIN — TOPIRAMATE 50 MG: 25 TABLET, FILM COATED ORAL at 15:08

## 2024-10-17 RX ADMIN — HALOPERIDOL 15 MG: 5 TABLET ORAL at 20:10

## 2024-10-17 RX ADMIN — TRAZODONE HYDROCHLORIDE 200 MG: 100 TABLET ORAL at 20:12

## 2024-10-17 ASSESSMENT — PAIN DESCRIPTION - LOCATION
LOCATION: HEAD
LOCATION: HEAD

## 2024-10-17 ASSESSMENT — PAIN DESCRIPTION - DESCRIPTORS
DESCRIPTORS: ACHING
DESCRIPTORS: ACHING

## 2024-10-17 ASSESSMENT — PAIN DESCRIPTION - ORIENTATION
ORIENTATION: RIGHT
ORIENTATION: RIGHT

## 2024-10-17 ASSESSMENT — PAIN - FUNCTIONAL ASSESSMENT: PAIN_FUNCTIONAL_ASSESSMENT: PREVENTS OR INTERFERES SOME ACTIVE ACTIVITIES AND ADLS

## 2024-10-17 ASSESSMENT — PAIN SCALES - GENERAL
PAINLEVEL_OUTOF10: 0
PAINLEVEL_OUTOF10: 8
PAINLEVEL_OUTOF10: 7

## 2024-10-17 NOTE — BH NOTE
Affect flat, mood is depressed. Patient a & o x 4.  Denies pain. No SI, HI, but reports AVH. Patient tolerated medications well.  He is cooperative with staff.  Patient is currently sleeping during the time of this note.                                                                                                 Slept 8 hours.

## 2024-10-17 NOTE — BH NOTE
Patient remains on the unit and continues to endorse AH, he denies SI. Asked about calling rehab and he stated he was not mentally stable if he transitioned to rehab immediately, will suggest he call tomorrow and let them know he would be discharged next week. He does not participate in groups and only comes out for meals.

## 2024-10-17 NOTE — BH NOTE
Rec'd in patient room.  AAOx3.  Denies HI/AVTH.  Endorses SI - no plan or intent.  Calm and cooperative at present.  Medication compliant at present.  Interacting appropriately with staff at present.  Isolative/withdrawn.  Only comes out of room for meals.  Able to make needs known.  No s/s of distress noted.  Denies pain/discomfort at present.  Will continue to monitor Q 15 min checks (BHT)/Q 1 hr check (RN).

## 2024-10-17 NOTE — GROUP NOTE
Group Therapy Note    Date: 10/17/2024      Pt did not attend group today.  I will encourage him to participate in the future as part of his recovery plan       Signature:  Ibeth Taylor LCSW

## 2024-10-17 NOTE — PLAN OF CARE
Problem: Discharge Planning  Goal: Discharge to home or other facility with appropriate resources  10/16/2024 2242 by Kavitha Adams RN  Outcome: Progressing  Flowsheets (Taken 10/16/2024 2229)  Discharge to home or other facility with appropriate resources: Identify barriers to discharge with patient and caregiver     Problem: Self Harm/Suicidality  Goal: Will have no self-injury during hospital stay  Description: INTERVENTIONS:  1.  Ensure constant observer at bedside with Q15M safety checks  2.  Maintain a safe environment  3.  Secure patient belongings  4.  Ensure family/visitors adhere to safety recommendations  5.  Ensure safety tray has been added to patient's diet order  6.  Every shift and PRN: Re-assess suicidal risk via Frequent Screener    10/16/2024 2242 by Kavitha Adams RN  Outcome: Progressing  Flowsheets (Taken 10/16/2024 2229)  Will have no self-injury during hospital stay: Maintain a safe environment     Problem: ABCDS Injury Assessment  Goal: Absence of physical injury  10/16/2024 2242 by Kavitha Adams RN  Outcome: Progressing     Problem: Chronic Conditions and Co-morbidities  Goal: Patient's chronic conditions and co-morbidity symptoms are monitored and maintained or improved  10/16/2024 2242 by Kavitha Adams RN  Outcome: Progressing  Flowsheets (Taken 10/16/2024 2229)  Care Plan - Patient's Chronic Conditions and Co-Morbidity Symptoms are Monitored and Maintained or Improved: Monitor and assess patient's chronic conditions and comorbid symptoms for stability, deterioration, or improvement     Problem: Anxiety  Goal: Will report anxiety at manageable levels  Description: INTERVENTIONS:  1. Administer medication as ordered  2. Teach and rehearse alternative coping skills  3. Provide emotional support with 1:1 interaction with staff  10/16/2024 2242 by Kavitha Adams, RN  Outcome: Progressing  Flowsheets (Taken 10/16/2024 2229)  Will report anxiety at manageable levels: Administer medication as

## 2024-10-17 NOTE — PROGRESS NOTES
INTERVAL Hx:  Records and clinical information were reviewed. States he's feeling \"so-so\" overall, and that he's still quite depressed and having strong AH's. However, he does feel a little better physically, and even his mood seems to be slightly better. He doesn't feel clear enough to transition to a Rehab program yet, so we'll hold off another day or 2 before calling the [facility] or other facilities. Tolerating the Prozac after 1 dose, and I reiterated the Rx plan of cross-tapering from the Zoloft. The AH's are still strong and derogatory, but perhaps a little softer than PTA. No EPSE's or sedation, and I don't think his facial grimaces are TD--more of a tic-like habit. Not as hopeless as he was PTA, and he denies having any SI now. Slept 8 hours last night.     MEDS:  Current Facility-Administered Medications   Medication Dose Route Frequency    sertraline (ZOLOFT) tablet 100 mg  100 mg Oral Daily    FLUoxetine (PROZAC) capsule 10 mg  10 mg Oral Daily    ondansetron (ZOFRAN-ODT) disintegrating tablet 4 mg  4 mg Oral Q4H PRN    pantoprazole (PROTONIX) tablet 40 mg  40 mg Oral BID AC    haloperidol (HALDOL) tablet 15 mg  15 mg Oral BID    QUEtiapine (SEROQUEL) tablet 800 mg  800 mg Oral QHS    topiramate (TOPAMAX) tablet 50 mg  50 mg Oral TID    traZODone (DESYREL) tablet 200 mg  200 mg Oral Nightly    haloperidol (HALDOL) tablet 5 mg  5 mg Oral Q6H PRN    acetaminophen (TYLENOL) tablet 650 mg  650 mg Oral Q4H PRN    hydrOXYzine HCl (ATARAX) tablet 50 mg  50 mg Oral TID PRN    polyethylene glycol (GLYCOLAX) packet 17 g  17 g Oral Daily PRN    nicotine (NICODERM CQ) 14 MG/24HR 1 patch  1 patch TransDERmal Daily    influenza tiss-cult vaccine (FLUCELVAX) injection 0.5 mL  0.5 mL IntraMUSCular Prior to discharge    aluminum & magnesium hydroxide-simethicone (MAALOX) 200-200-20 MG/5ML suspension 30 mL  30 mL Oral Q6H PRN    guaiFENesin-dextromethorphan (ROBITUSSIN DM) 100-10 MG/5ML syrup 10 mL  10 mL Oral Q4H PRN  Pt notified

## 2024-10-18 PROCEDURE — 1240000000 HC EMOTIONAL WELLNESS R&B

## 2024-10-18 PROCEDURE — 99232 SBSQ HOSP IP/OBS MODERATE 35: CPT | Performed by: PSYCHIATRY & NEUROLOGY

## 2024-10-18 PROCEDURE — 6370000000 HC RX 637 (ALT 250 FOR IP): Performed by: PSYCHIATRY & NEUROLOGY

## 2024-10-18 RX ADMIN — POLYETHYLENE GLYCOL 3350 17 G: 17 POWDER, FOR SOLUTION ORAL at 20:16

## 2024-10-18 RX ADMIN — ALUMINUM HYDROXIDE, MAGNESIUM HYDROXIDE, AND SIMETHICONE 30 ML: 1200; 120; 1200 SUSPENSION ORAL at 20:16

## 2024-10-18 RX ADMIN — QUETIAPINE FUMARATE 800 MG: 200 TABLET ORAL at 20:15

## 2024-10-18 RX ADMIN — HYDROXYZINE HYDROCHLORIDE 50 MG: 25 TABLET, FILM COATED ORAL at 20:16

## 2024-10-18 RX ADMIN — HALOPERIDOL 15 MG: 5 TABLET ORAL at 08:27

## 2024-10-18 RX ADMIN — FLUOXETINE HYDROCHLORIDE 10 MG: 10 CAPSULE ORAL at 08:28

## 2024-10-18 RX ADMIN — TOPIRAMATE 50 MG: 25 TABLET, FILM COATED ORAL at 20:14

## 2024-10-18 RX ADMIN — PANTOPRAZOLE SODIUM 40 MG: 40 TABLET, DELAYED RELEASE ORAL at 15:31

## 2024-10-18 RX ADMIN — HALOPERIDOL 15 MG: 5 TABLET ORAL at 20:15

## 2024-10-18 RX ADMIN — ACETAMINOPHEN 650 MG: 325 TABLET ORAL at 12:42

## 2024-10-18 RX ADMIN — TRAZODONE HYDROCHLORIDE 200 MG: 100 TABLET ORAL at 20:15

## 2024-10-18 RX ADMIN — TOPIRAMATE 50 MG: 25 TABLET, FILM COATED ORAL at 15:31

## 2024-10-18 RX ADMIN — SERTRALINE 100 MG: 100 TABLET, FILM COATED ORAL at 08:27

## 2024-10-18 RX ADMIN — TOPIRAMATE 50 MG: 25 TABLET, FILM COATED ORAL at 08:26

## 2024-10-18 RX ADMIN — PANTOPRAZOLE SODIUM 40 MG: 40 TABLET, DELAYED RELEASE ORAL at 06:14

## 2024-10-18 ASSESSMENT — PAIN SCALES - GENERAL
PAINLEVEL_OUTOF10: 7
PAINLEVEL_OUTOF10: 6
PAINLEVEL_OUTOF10: 7
PAINLEVEL_OUTOF10: 7

## 2024-10-18 ASSESSMENT — PAIN DESCRIPTION - DESCRIPTORS
DESCRIPTORS: ACHING
DESCRIPTORS: ACHING

## 2024-10-18 ASSESSMENT — PAIN SCALES - WONG BAKER
WONGBAKER_NUMERICALRESPONSE: 4;6
WONGBAKER_NUMERICALRESPONSE: HURTS A LITTLE BIT
WONGBAKER_NUMERICALRESPONSE: HURTS A LITTLE BIT

## 2024-10-18 ASSESSMENT — PAIN DESCRIPTION - LOCATION
LOCATION: HEAD
LOCATION: HEAD;ABDOMEN

## 2024-10-18 NOTE — PLAN OF CARE
Problem: Discharge Planning  Goal: Discharge to home or other facility with appropriate resources  10/17/2024 0916 by Yina King RN  Outcome: Progressing  10/17/2024 0915 by Yina King RN  Outcome: Progressing  10/17/2024 0915 by Yina King RN  Outcome: Progressing  Flowsheets (Taken 10/17/2024 0902)  Discharge to home or other facility with appropriate resources: Identify barriers to discharge with patient and caregiver     Problem: Self Harm/Suicidality  Goal: Will have no self-injury during hospital stay  Description: INTERVENTIONS:  1.  Ensure constant observer at bedside with Q15M safety checks  2.  Maintain a safe environment  3.  Secure patient belongings  4.  Ensure family/visitors adhere to safety recommendations  5.  Ensure safety tray has been added to patient's diet order  6.  Every shift and PRN: Re-assess suicidal risk via Frequent Screener    10/17/2024 2118 by Sunshine Matthews RN  Outcome: Progressing  10/17/2024 0916 by Yina King RN  Outcome: Progressing  10/17/2024 0915 by Yina King RN  Outcome: Progressing  10/17/2024 0915 by Yina King RN  Outcome: Progressing  Flowsheets (Taken 10/17/2024 0902)  Will have no self-injury during hospital stay: Maintain a safe environment     Problem: Pain  Goal: Verbalizes/displays adequate comfort level or baseline comfort level  10/17/2024 2118 by Sunshine Matthews RN  Outcome: Progressing  10/17/2024 0916 by Yina King RN  Outcome: Progressing  10/17/2024 0915 by Yina King RN  Outcome: Progressing  10/17/2024 0915 by Yina King RN  Outcome: Progressing     Problem: ABCDS Injury Assessment  Goal: Absence of physical injury  10/17/2024 0916 by Yina King RN  Outcome: Progressing  10/17/2024 0915 by Yina King RN  Outcome: Progressing  10/17/2024 0915 by Yina King RN  Outcome: Progressing     Problem: Chronic Conditions and Co-morbidities  Goal: Patient's chronic

## 2024-10-18 NOTE — GROUP NOTE
Group Therapy Note    Date: 10/18/2024    Group Start Time: 0900  Group End Time: 0950  Group Topic: Process Group - Inpatient    [facility]    Ibeth Tayolr LCSW        Group Therapy Note    Attendees: 5 scheduled     Focus of session was on effective ways to control anxiety.  We spoke about how these specific skills can have a very immediate effect on stress and anxiety and we spoke about how we can motivate to use them.  We spoke about the following skills; dealing with problems on the spot, strong, confident relationships, slowing down, taking one thing at a time, coping with ruts, divide problems up, using past experiences, and building relaxation into our daily lives.  We spoke about which skill we will start to incorporate into our daily use.  (       Patient's Goal:   Will report anxiety at manageable levels     Notes:  Pt attended group today for the first time with me.  He was attentive and cooperative and was able to make eye contact at times  He listened to the group activity and discussion and responded briefly  when prompted.     Status After Intervention:  Improved    Participation Level: Active Listener and Minimal    Participation Quality: Appropriate and Attentive      Speech:  normal      Thought Process/Content: Logical      Affective Functioning: Congruent and Flat      Mood: depressed      Level of consciousness:  Alert      Response to Learning: Able to verbalize current knowledge/experience      Endings: None Reported    Modes of Intervention: Education, Support, Socialization, Exploration, Clarifying, Problem-solving, and Activity      Discipline Responsible: /Counselor      Signature:  Ibeth Taylor LCSW

## 2024-10-18 NOTE — BH NOTE
Precautions: Q15 safety checks    SI/HI - denies    AVH - denies    Pain : c/o of  7/10 headache and abdominal pain    Medication Compliance . yes    PRN Meds: Tylenol 650mg    Behaviors ; calm , cooperative, withdrawn    Group Attendance ;yes      Intake ; adequate

## 2024-10-18 NOTE — BH NOTE
Patient quiet this shift. Withdrawn. Out of room for snack. Guarded but cooperative. One on one given to establish trust. Voiced that he continues to have +AH  telling him to kill himself, with no plan at this time. Discussed some distractions techniques with patient such as reading, decreasing stimuli, or soft music or getting medications when voices are intensifying. C/o constipation so given prn Glycolax 17g at 1950 and c/o  a headache at 2011 so new order for Ibuprofen prescribed by Dr. Bedoya , which was given. Patient tolerated well. Pain subsided upon follow up. Rested in bed with eyes closed and equal rise/fall of chest for 6 hours.     PRN Medication Documentation    Specific patient behavior that led to the need for PRN medication: constipation  Staff interventions attempted prior to PRN being given: patient requested  PRN medication given: Glycolax 17 g at 1950  Patient responsiveness/effectiveness of PRN medication: results pending    PRN Medication Documentation    Specific patient behavior that led to the need for PRN medication: headache 8/10  Staff interventions attempted prior to PRN being given: patient requested  PRN medication given: Ibuprofen 600 mg by mouth at 2011  Patient responsiveness/effectiveness of PRN medication: effective

## 2024-10-18 NOTE — BH NOTE
Behavioral Health Interdisciplinary Rounds     Patient Name: Benitez Harmon  Age: 54 y.o.  Room/Bed:  230/01  Primary Diagnosis: Schizoaffective disorder, bipolar type (HCC)   Admission Status: Voluntary     Readmission within 30 days: No  Power of  in place: No  Patient requires a blocked bed: No14}          Reason for blocked bed:     Sleep hours: 6       Participation in Care/Groups:  No  Medication Compliant?: Yes  PRNS (last 24 hours): Pain    Restraints (last 24 hours):  No  Substance Abuse:  Yes    24 hour chart check complete: Yes    Patient goal(s) for today: to  feel better mentally   Treatment team focus/goals: Discharge to home or other facility with appropriate resources   Progress note: patient continues to report AH will encourage patient to call rehab today    LOS:  9  Expected LOS: 4-6    Financial concerns/prescription coverage:  No  Family contact: no      Family requesting physician contact today:  No  Discharge plan: [facility] rehab  Access to weapons: No       Outpatient provider(s): will be referred  Patient's preferred phone number for follow up call: [phone]     Participating treatment team members: Benitez DEA Harmon, Brigid Duque, Annette Duque, Mary Taylor, Dr. Bedoya(assigned SW), Veronica Lucero

## 2024-10-18 NOTE — BH NOTE
Patient was agreeable in calling [facility] today to speak with admissions. This writer assisted and introduced self and his need, provided demographic information. Staff were all assisting other callers and information will be relayed to intake and they will call patient directly on unit.

## 2024-10-18 NOTE — PLAN OF CARE
Problem: Self Harm/Suicidality  Goal: Will have no self-injury during hospital stay  Description: INTERVENTIONS:  1.  Ensure constant observer at bedside with Q15M safety checks  2.  Maintain a safe environment  3.  Secure patient belongings  4.  Ensure family/visitors adhere to safety recommendations  5.  Ensure safety tray has been added to patient's diet order  6.  Every shift and PRN: Re-assess suicidal risk via Frequent Screener    Outcome: Progressing     Problem: Pain  Goal: Verbalizes/displays adequate comfort level or baseline comfort level  Outcome: Progressing     Problem: Chronic Conditions and Co-morbidities  Goal: Patient's chronic conditions and co-morbidity symptoms are monitored and maintained or improved  Outcome: Progressing  Flowsheets (Taken 10/18/2024 0759)  Care Plan - Patient's Chronic Conditions and Co-Morbidity Symptoms are Monitored and Maintained or Improved: Monitor and assess patient's chronic conditions and comorbid symptoms for stability, deterioration, or improvement     Problem: Anxiety  Goal: Will report anxiety at manageable levels  Description: INTERVENTIONS:  1. Administer medication as ordered  2. Teach and rehearse alternative coping skills  3. Provide emotional support with 1:1 interaction with staff  Outcome: Progressing  Flowsheets (Taken 10/18/2024 0759)  Will report anxiety at manageable levels: Administer medication as ordered     Problem: Coping  Goal: Pt/Family able to verbalize concerns and demonstrate effective coping strategies  Description: INTERVENTIONS:  1. Assist patient/family to identify coping skills, available support systems and cultural and spiritual values  2. Provide emotional support, including active listening and acknowledgement of concerns of patient and caregivers  3. Reduce environmental stimuli, as able  4. Instruct patient/family in relaxation techniques, as appropriate  5. Assess for spiritual pain/suffering and initiate Spiritual Care,

## 2024-10-18 NOTE — PROGRESS NOTES
INTERVAL Hx:  Records and clinical information were reviewed. States he's still feeling \"so-so\" overall, but that he's definitely improved slightly compared to PTA. States he's still depressed and still having strong AH's, but objectively he seems slightly less distressed by them, and his mood is not as anxious or dysphoric. He agrees that he should call the [facility] today to start the referral process, and I supported that as well.  Tolerating the Prozac after 2 dose3, and I reiterated the Rx plan of cross-tapering from the Zoloft. The AH's are still strong and derogatory, but perhaps a little softer than PTA. No EPSE's or sedation, and I don't think his facial grimaces are TD--more of a tic-like habit. Not as hopeless as he was PTA, and he denies having any SI now. Slept 6 hours last night.     MEDS:  Current Facility-Administered Medications   Medication Dose Route Frequency    [START ON 10/19/2024] FLUoxetine (PROZAC) capsule 20 mg  20 mg Oral Daily    ibuprofen (ADVIL;MOTRIN) tablet 600 mg  600 mg Oral Q8H PRN    sertraline (ZOLOFT) tablet 100 mg  100 mg Oral Daily    ondansetron (ZOFRAN-ODT) disintegrating tablet 4 mg  4 mg Oral Q4H PRN    pantoprazole (PROTONIX) tablet 40 mg  40 mg Oral BID AC    haloperidol (HALDOL) tablet 15 mg  15 mg Oral BID    QUEtiapine (SEROQUEL) tablet 800 mg  800 mg Oral QHS    topiramate (TOPAMAX) tablet 50 mg  50 mg Oral TID    traZODone (DESYREL) tablet 200 mg  200 mg Oral Nightly    haloperidol (HALDOL) tablet 5 mg  5 mg Oral Q6H PRN    acetaminophen (TYLENOL) tablet 650 mg  650 mg Oral Q4H PRN    hydrOXYzine HCl (ATARAX) tablet 50 mg  50 mg Oral TID PRN    polyethylene glycol (GLYCOLAX) packet 17 g  17 g Oral Daily PRN    nicotine (NICODERM CQ) 14 MG/24HR 1 patch  1 patch TransDERmal Daily    influenza tiss-cult vaccine (FLUCELVAX) injection 0.5 mL  0.5 mL IntraMUSCular Prior to discharge    aluminum & magnesium hydroxide-simethicone (MAALOX) 200-200-20 MG/5ML suspension 30  mL  30 mL Oral Q6H PRN    guaiFENesin-dextromethorphan (ROBITUSSIN DM) 100-10 MG/5ML syrup 10 mL  10 mL Oral Q4H PRN       VITALS: Patient Vitals for the past 12 hrs:   Temp Pulse Resp BP SpO2   10/18/24 0759 99 °F (37.2 °C) 80 18 111/72 97 %       LABS: .  No results found for this or any previous visit (from the past 24 hour(s)).        MSE:   Appearance: casually dressed; adequately groomed  Behavior: mostly calm and cooperative; no agitation  Motor: less slowed and weak  Mood/Affect: still dysphoric; restricted  Thought Process: mostly organized  Thought Content: no apparent delusions; denies SI/HI  Perceptions: strong AH's  Insight/Judgment: fair    ASSESSMENT:   1.  Schizoaffective disorder, bipolar type (F25.0)  2.  Cocaine use disorder with recent binge (F14.20)  3.  Doubt Tardive Dyskinesia    PLAN:  Continue the Haldol at 15 mg BID.  Continue the Seroquel at 800 mg QHS.  Taper the Zoloft--on 100 mg daily now and will decrease it after the WE.  Increase the Prozac to 20 mg daily, starting tomorrow.  Continue the Trazodone at 200 mg QHS and the Atarax PRN.  Continue the Topamax at 50 mg BID (for HA's)  ELOS: 4-6 days--hope to transition to inpatient Rehab at [facility] or other facility.

## 2024-10-19 PROCEDURE — 6370000000 HC RX 637 (ALT 250 FOR IP): Performed by: HOSPITALIST

## 2024-10-19 PROCEDURE — 6370000000 HC RX 637 (ALT 250 FOR IP): Performed by: PSYCHIATRY & NEUROLOGY

## 2024-10-19 PROCEDURE — 1240000000 HC EMOTIONAL WELLNESS R&B

## 2024-10-19 RX ORDER — CALCIUM CARBONATE 500 MG/1
500 TABLET, CHEWABLE ORAL 3 TIMES DAILY PRN
Status: DISCONTINUED | OUTPATIENT
Start: 2024-10-19 | End: 2024-10-21

## 2024-10-19 RX ADMIN — FLUOXETINE HYDROCHLORIDE 20 MG: 20 CAPSULE ORAL at 08:10

## 2024-10-19 RX ADMIN — POLYETHYLENE GLYCOL 3350 17 G: 17 POWDER, FOR SOLUTION ORAL at 21:20

## 2024-10-19 RX ADMIN — TRAZODONE HYDROCHLORIDE 200 MG: 100 TABLET ORAL at 21:01

## 2024-10-19 RX ADMIN — HYDROXYZINE HYDROCHLORIDE 50 MG: 25 TABLET, FILM COATED ORAL at 21:01

## 2024-10-19 RX ADMIN — TOPIRAMATE 50 MG: 25 TABLET, FILM COATED ORAL at 14:37

## 2024-10-19 RX ADMIN — TOPIRAMATE 50 MG: 25 TABLET, FILM COATED ORAL at 21:01

## 2024-10-19 RX ADMIN — ONDANSETRON 4 MG: 4 TABLET, ORALLY DISINTEGRATING ORAL at 12:15

## 2024-10-19 RX ADMIN — QUETIAPINE FUMARATE 800 MG: 200 TABLET ORAL at 21:01

## 2024-10-19 RX ADMIN — PANTOPRAZOLE SODIUM 40 MG: 40 TABLET, DELAYED RELEASE ORAL at 08:10

## 2024-10-19 RX ADMIN — SERTRALINE 100 MG: 100 TABLET, FILM COATED ORAL at 08:10

## 2024-10-19 RX ADMIN — ALUMINUM HYDROXIDE, MAGNESIUM HYDROXIDE, AND SIMETHICONE 30 ML: 1200; 120; 1200 SUSPENSION ORAL at 14:25

## 2024-10-19 RX ADMIN — TOPIRAMATE 50 MG: 25 TABLET, FILM COATED ORAL at 08:10

## 2024-10-19 RX ADMIN — HALOPERIDOL 15 MG: 5 TABLET ORAL at 21:01

## 2024-10-19 RX ADMIN — ALUMINUM HYDROXIDE, MAGNESIUM HYDROXIDE, AND SIMETHICONE 30 ML: 1200; 120; 1200 SUSPENSION ORAL at 21:02

## 2024-10-19 RX ADMIN — HALOPERIDOL 15 MG: 5 TABLET ORAL at 08:09

## 2024-10-19 RX ADMIN — ACETAMINOPHEN 650 MG: 325 TABLET ORAL at 10:00

## 2024-10-19 RX ADMIN — PANTOPRAZOLE SODIUM 40 MG: 40 TABLET, DELAYED RELEASE ORAL at 15:29

## 2024-10-19 ASSESSMENT — PAIN SCALES - WONG BAKER
WONGBAKER_NUMERICALRESPONSE: NO HURT
WONGBAKER_NUMERICALRESPONSE: HURTS LITTLE MORE
WONGBAKER_NUMERICALRESPONSE: HURTS A LITTLE BIT

## 2024-10-19 ASSESSMENT — PAIN - FUNCTIONAL ASSESSMENT
PAIN_FUNCTIONAL_ASSESSMENT: PREVENTS OR INTERFERES SOME ACTIVE ACTIVITIES AND ADLS
PAIN_FUNCTIONAL_ASSESSMENT: ACTIVITIES ARE NOT PREVENTED

## 2024-10-19 ASSESSMENT — PAIN DESCRIPTION - DESCRIPTORS: DESCRIPTORS: STABBING

## 2024-10-19 ASSESSMENT — PAIN DESCRIPTION - LOCATION
LOCATION: HEAD;ABDOMEN
LOCATION: ABDOMEN

## 2024-10-19 ASSESSMENT — PAIN SCALES - GENERAL
PAINLEVEL_OUTOF10: 8
PAINLEVEL_OUTOF10: 4
PAINLEVEL_OUTOF10: 0
PAINLEVEL_OUTOF10: 8

## 2024-10-19 NOTE — PLAN OF CARE
Problem: Self Harm/Suicidality  Goal: Will have no self-injury during hospital stay  Description: INTERVENTIONS:  1.  Ensure constant observer at bedside with Q15M safety checks  2.  Maintain a safe environment  3.  Secure patient belongings  4.  Ensure family/visitors adhere to safety recommendations  5.  Ensure safety tray has been added to patient's diet order  6.  Every shift and PRN: Re-assess suicidal risk via Frequent Screener    Outcome: Progressing     Problem: Pain  Goal: Verbalizes/displays adequate comfort level or baseline comfort level  Outcome: Progressing     Problem: ABCDS Injury Assessment  Goal: Absence of physical injury  Outcome: Progressing     Problem: Chronic Conditions and Co-morbidities  Goal: Patient's chronic conditions and co-morbidity symptoms are monitored and maintained or improved  Outcome: Progressing     Problem: Behavior  Goal: Pt/Family maintain appropriate behavior and adhere to behavioral management agreement, if implemented  Description: INTERVENTIONS:  1. Assess patient/family's coping skills and  non-compliant behavior (including use of illegal substances)  2. Notify security of behavior or suspected illegal substances which indicate the need for search of the family and/or belongings  3. Encourage verbalization of thoughts and concerns in a socially appropriate manner  4. Utilize positive, consistent limit setting strategies supporting safety of patient, staff and others  5. Encourage participation in the decision making process about the behavioral management agreement  6. If a visitor's behavior poses a threat to safety call refer to organization policy.  7. Initiate consult with , Psychosocial CNS, Spiritual Care as appropriate  Outcome: Progressing

## 2024-10-19 NOTE — BH NOTE
Affect blunt, Mood depressed.  Alert and oriented x 4.  Ate 50 % of meals. He was cooperative and calm, he interacts well with peers and staff..  Denies SI/HI, however he says \" I still hear voices that are I know aren't from real people\". He complained of pain in the head and abdomen and was given PRNs.  Med compliant. Denies SE's from meds.  PRN Medication Documentation    Specific patient behavior that led to need for PRN medication: pain in the head and abdomen  Staff interventions attempted prior to PRN being given: assessment   PRN medication given: Tylenol 650mg   Patient response/effectiveness of PRN medication: good    Specific patient behavior that led to need for PRN medication: constipation  Staff interventions attempted prior to PRN being given: assessment  PRN medication given: Maalox 30mls @1425pm  Patient response/effectiveness of PRN medication: good    Specific patient behavior that led to need for PRN medication: nausea  Staff interventions attempted prior to PRN being given: assessment  PRN medication given: Zofran 4mg @1215pm  Patient response/effectiveness of PRN medication: good

## 2024-10-19 NOTE — PROGRESS NOTES
INTERVAL Hx:    10/19/2024  Benitez reports poor sleep last night and contributes this to his stomach discomfort.  Presently on Protonix twice daily without any recent ulcer diagnosis?  He does have a history of multiple ulcers.  He has also been receiving Maalox and as needed symptom management for indigestion during this stay.  Other somatic complaints include dizziness.  Nursing reports blood pressure has been stable along with amount of water he has been drinking for hydration.  Other than poor sleep notably about 6 hours he reports, medications are helpful without any known side effects.  No instances reported behaviorally last night.  Denies any significant issues with anxiety or appetite despite stomach upset.  Denies any acute thoughts of homicide.  He does have some passive thoughts of suicide presently without a plan and does feel safe in this facility to agree not to harm himself.  He also endorses audio hallucinations today are \"confusing\" , unsure of what he is hearing and visual hallucinations of shadows.    10/18/2024  Records and clinical information were reviewed. States he's still feeling \"so-so\" overall, but that he's definitely improved slightly compared to PTA. States he's still depressed and still having strong AH's, but objectively he seems slightly less distressed by them, and his mood is not as anxious or dysphoric. He agrees that he should call the [facility] today to start the referral process, and I supported that as well.  Tolerating the Prozac after 2 dose3, and I reiterated the Rx plan of cross-tapering from the Zoloft. The AH's are still strong and derogatory, but perhaps a little softer than PTA. No EPSE's or sedation, and I don't think his facial grimaces are TD--more of a tic-like habit. Not as hopeless as he was PTA, and he denies having any SI now. Slept 6 hours last night.     MEDS:  Current Facility-Administered Medications   Medication Dose Route Frequency    FLUoxetine  (PROZAC) capsule 20 mg  20 mg Oral Daily    ibuprofen (ADVIL;MOTRIN) tablet 600 mg  600 mg Oral Q8H PRN    sertraline (ZOLOFT) tablet 100 mg  100 mg Oral Daily    ondansetron (ZOFRAN-ODT) disintegrating tablet 4 mg  4 mg Oral Q4H PRN    pantoprazole (PROTONIX) tablet 40 mg  40 mg Oral BID AC    haloperidol (HALDOL) tablet 15 mg  15 mg Oral BID    QUEtiapine (SEROQUEL) tablet 800 mg  800 mg Oral QHS    topiramate (TOPAMAX) tablet 50 mg  50 mg Oral TID    traZODone (DESYREL) tablet 200 mg  200 mg Oral Nightly    haloperidol (HALDOL) tablet 5 mg  5 mg Oral Q6H PRN    acetaminophen (TYLENOL) tablet 650 mg  650 mg Oral Q4H PRN    hydrOXYzine HCl (ATARAX) tablet 50 mg  50 mg Oral TID PRN    polyethylene glycol (GLYCOLAX) packet 17 g  17 g Oral Daily PRN    nicotine (NICODERM CQ) 14 MG/24HR 1 patch  1 patch TransDERmal Daily    influenza tiss-cult vaccine (FLUCELVAX) injection 0.5 mL  0.5 mL IntraMUSCular Prior to discharge    aluminum & magnesium hydroxide-simethicone (MAALOX) 200-200-20 MG/5ML suspension 30 mL  30 mL Oral Q6H PRN    guaiFENesin-dextromethorphan (ROBITUSSIN DM) 100-10 MG/5ML syrup 10 mL  10 mL Oral Q4H PRN       VITALS: Patient Vitals for the past 12 hrs:   Temp Pulse Resp BP SpO2   10/19/24 0743 98.8 °F (37.1 °C) 79 19 112/76 98 %       LABS: .  No results found for this or any previous visit (from the past 24 hour(s)).        MSE:   Appearance: casually dressed; adequately groomed  Behavior: mostly calm and cooperative; no agitation  Motor: less slowed and weak  Mood/Affect: still dysphoric; restricted  Thought Process: mostly organized  Thought Content: no apparent delusions; denies SI/HI  Perceptions: strong AH's  Insight/Judgment: fair    ASSESSMENT:   1.  Schizoaffective disorder, bipolar type (F25.0)  2.  Cocaine use disorder with recent binge (F14.20)  3.  Doubt Tardive Dyskinesia    PLAN:    Update   10/19/2024  No change in psychiatric medications continue as prescribed and plan below.  Continue

## 2024-10-20 PROCEDURE — 6370000000 HC RX 637 (ALT 250 FOR IP): Performed by: INTERNAL MEDICINE

## 2024-10-20 PROCEDURE — 6360000002 HC RX W HCPCS: Performed by: PSYCHIATRY & NEUROLOGY

## 2024-10-20 PROCEDURE — 90656 IIV3 VACC NO PRSV 0.5 ML IM: CPT | Performed by: PSYCHIATRY & NEUROLOGY

## 2024-10-20 PROCEDURE — 6370000000 HC RX 637 (ALT 250 FOR IP): Performed by: NURSE PRACTITIONER

## 2024-10-20 PROCEDURE — 6370000000 HC RX 637 (ALT 250 FOR IP): Performed by: PSYCHIATRY & NEUROLOGY

## 2024-10-20 PROCEDURE — 1240000000 HC EMOTIONAL WELLNESS R&B

## 2024-10-20 PROCEDURE — 6370000000 HC RX 637 (ALT 250 FOR IP): Performed by: HOSPITALIST

## 2024-10-20 PROCEDURE — G0008 ADMIN INFLUENZA VIRUS VAC: HCPCS | Performed by: PSYCHIATRY & NEUROLOGY

## 2024-10-20 RX ORDER — FLUTICASONE PROPIONATE 50 MCG
2 SPRAY, SUSPENSION (ML) NASAL 2 TIMES DAILY
Status: COMPLETED | OUTPATIENT
Start: 2024-10-20 | End: 2024-10-24

## 2024-10-20 RX ORDER — GUAIFENESIN 600 MG/1
600 TABLET, EXTENDED RELEASE ORAL 2 TIMES DAILY
Status: DISCONTINUED | OUTPATIENT
Start: 2024-10-20 | End: 2024-10-25 | Stop reason: HOSPADM

## 2024-10-20 RX ADMIN — FLUOXETINE HYDROCHLORIDE 20 MG: 20 CAPSULE ORAL at 09:20

## 2024-10-20 RX ADMIN — HALOPERIDOL 15 MG: 5 TABLET ORAL at 09:20

## 2024-10-20 RX ADMIN — TOPIRAMATE 50 MG: 25 TABLET, FILM COATED ORAL at 15:13

## 2024-10-20 RX ADMIN — TOPIRAMATE 50 MG: 25 TABLET, FILM COATED ORAL at 19:59

## 2024-10-20 RX ADMIN — GUAIFENESIN 600 MG: 600 TABLET ORAL at 13:54

## 2024-10-20 RX ADMIN — QUETIAPINE FUMARATE 800 MG: 200 TABLET ORAL at 20:00

## 2024-10-20 RX ADMIN — PANTOPRAZOLE SODIUM 40 MG: 40 TABLET, DELAYED RELEASE ORAL at 07:56

## 2024-10-20 RX ADMIN — FLUTICASONE PROPIONATE 2 SPRAY: 50 SPRAY, METERED NASAL at 22:30

## 2024-10-20 RX ADMIN — PANTOPRAZOLE SODIUM 40 MG: 40 TABLET, DELAYED RELEASE ORAL at 16:09

## 2024-10-20 RX ADMIN — HALOPERIDOL 15 MG: 5 TABLET ORAL at 19:59

## 2024-10-20 RX ADMIN — SERTRALINE 100 MG: 100 TABLET, FILM COATED ORAL at 09:20

## 2024-10-20 RX ADMIN — ALUMINUM HYDROXIDE, MAGNESIUM HYDROXIDE, AND SIMETHICONE 30 ML: 1200; 120; 1200 SUSPENSION ORAL at 22:01

## 2024-10-20 RX ADMIN — ONDANSETRON 4 MG: 4 TABLET, ORALLY DISINTEGRATING ORAL at 13:54

## 2024-10-20 RX ADMIN — TOPIRAMATE 50 MG: 25 TABLET, FILM COATED ORAL at 09:20

## 2024-10-20 RX ADMIN — TRAZODONE HYDROCHLORIDE 200 MG: 100 TABLET ORAL at 19:59

## 2024-10-20 RX ADMIN — HYDROXYZINE HYDROCHLORIDE 50 MG: 25 TABLET, FILM COATED ORAL at 23:05

## 2024-10-20 RX ADMIN — FLUTICASONE PROPIONATE 2 SPRAY: 50 SPRAY, METERED NASAL at 14:13

## 2024-10-20 RX ADMIN — GUAIFENESIN 600 MG: 600 TABLET ORAL at 20:00

## 2024-10-20 RX ADMIN — CALCIUM CARBONATE 500 MG: 500 TABLET, CHEWABLE ORAL at 20:00

## 2024-10-20 RX ADMIN — ALUMINUM HYDROXIDE, MAGNESIUM HYDROXIDE, AND SIMETHICONE 30 ML: 1200; 120; 1200 SUSPENSION ORAL at 13:54

## 2024-10-20 RX ADMIN — POLYETHYLENE GLYCOL 3350 17 G: 17 POWDER, FOR SOLUTION ORAL at 20:08

## 2024-10-20 RX ADMIN — INFLUENZA A VIRUS A/VICTORIA/4897/2022 IVR-238 (H1N1) ANTIGEN (PROPIOLACTONE INACTIVATED), INFLUENZA A VIRUS A/THAILAND/8/2022 IVR-237 (H3N2) ANTIGEN (PROPIOLACTONE INACTIVATED), INFLUENZA B VIRUS B/AUSTRIA/1359417/2021 BVR-26 ANTIGEN (PROPIOLACTONE INACTIVATED) 0.5 ML: 15; 15; 15 INJECTION, SUSPENSION INTRAMUSCULAR at 09:21

## 2024-10-20 ASSESSMENT — PAIN DESCRIPTION - LOCATION: LOCATION: ABDOMEN

## 2024-10-20 ASSESSMENT — PAIN - FUNCTIONAL ASSESSMENT: PAIN_FUNCTIONAL_ASSESSMENT: PREVENTS OR INTERFERES SOME ACTIVE ACTIVITIES AND ADLS

## 2024-10-20 ASSESSMENT — PAIN DESCRIPTION - DESCRIPTORS: DESCRIPTORS: STABBING

## 2024-10-20 ASSESSMENT — PAIN SCALES - GENERAL: PAINLEVEL_OUTOF10: 8

## 2024-10-20 NOTE — GROUP NOTE
Group Therapy Note    Date: 10/20/2024    Group Start Time: 0945  Group End Time: 0950  Group Topic: Community Meeting    [facility]    Musa Enrique        Group Therapy Note    Attendees: 2/6       Patient's Goal:  Patient stated he would like to calm the voices in his head and find ways to be at peace. Patient stated watching tv helps him stay out of his head at times.    Notes:  patient picked an ice breaker question \"what is one thing on his bucket list\" patient responded with he would like to travel to [place], and [place]. Patient stated he's only been to [facility] and [place].     Status After Intervention:  Improved    Participation Level: Active Listener and Interactive    Participation Quality: Appropriate, Attentive, and Sharing      Speech:  normal      Thought Process/Content: Logical      Affective Functioning: Congruent      Mood: calm, flat      Level of consciousness:  Alert and Oriented x4      Response to Learning: Able to verbalize current knowledge/experience, Able to verbalize/acknowledge new learning, and Capable of insight      Endings: None Reported    Modes of Intervention: Education, Support, and Socialization      Discipline Responsible: Behavorial Health Tech      Signature:  Musa Enrique

## 2024-10-20 NOTE — BH NOTE
PRN Medication Documentation    Specific patient behavior that led to need for PRN medication: nausea  Staff interventions attempted prior to PRN being given: assessment  PRN medication given: Zofran 4mg @1354hrs  Patient response/effectiveness of PRN medication: good    PRN Medication Documentation    Specific patient behavior that led to need for PRN medication: stuffy nose  Staff interventions attempted prior to PRN being given: assessment  PRN medication given: fluticarson nasal spray @1354hrs  Patient response/effectiveness of PRN medication: good

## 2024-10-20 NOTE — PROGRESS NOTES
INTERVAL Hx:    10/20/2024  Pierce reports he did not sleep well again last night due to his stomach bothering him.  Nursing reports improvement as far as vomiting and he has had none today or complain of significant nausea.  Nursing reports he slept about 5 hours.  In addition to Protonix twice daily he has been prescribed Tums as needed.    He denies any homicidal ideations but continues to have some suicidal ideations and feels safe here that he will not harm himself while hospitalized.  He also continues to endorse command audio hallucinations as well as visual hallucinations without disclosing and stating \"things I should not see or hear\".  He does think his medications are helpful may be \"a little\".  Denies any known side effects from taking.    He also complains of congestion with significant postnasal drainage and chest congestion which could all be contributing to his stomach discomfort.    10/19/2024  Benitez reports poor sleep last night and contributes this to his stomach discomfort.  Presently on Protonix twice daily without any recent ulcer diagnosis?  He does have a history of multiple ulcers.  He has also been receiving Maalox and as needed symptom management for indigestion during this stay.  Other somatic complaints include dizziness.  Nursing reports blood pressure has been stable along with amount of water he has been drinking for hydration.  Other than poor sleep notably about 6 hours he reports, medications are helpful without any known side effects.  No instances reported behaviorally last night.  Denies any significant issues with anxiety or appetite despite stomach upset.  Denies any acute thoughts of homicide.  He does have some passive thoughts of suicide presently without a plan and does feel safe in this facility to agree not to harm himself.  He also endorses audio hallucinations today are \"confusing\" , unsure of what he is hearing and visual hallucinations of

## 2024-10-20 NOTE — PLAN OF CARE
Benitez has been in group room watching tv with other peers on the unit.   He continues to have anxiety which he rates as 7/10, depression 8/10.  Still c/o abdominal pain and states he has not had bowel movement in over 3 days.  He also having AH with voices trying to convince him to hurt himself.  Benitez was medicated for abdominal pain and given Simethicone for abdominal pain and Glycol for constipation.  He continues to state that he is able to contract for safety.  Will continue to monitor and provide assistance as needed.

## 2024-10-20 NOTE — PLAN OF CARE
Problem: Self Harm/Suicidality  Goal: Will have no self-injury during hospital stay  Description: INTERVENTIONS:  1.  Ensure constant observer at bedside with Q15M safety checks  2.  Maintain a safe environment  3.  Secure patient belongings  4.  Ensure family/visitors adhere to safety recommendations  5.  Ensure safety tray has been added to patient's diet order  6.  Every shift and PRN: Re-assess suicidal risk via Frequent Screener    10/20/2024 1133 by Deisy Velasquez RN  Outcome: Progressing  10/19/2024 2228 by Precious Kerr RN  Outcome: Progressing     Problem: Pain  Goal: Verbalizes/displays adequate comfort level or baseline comfort level  10/20/2024 1133 by Deisy Velasquez RN  Outcome: Progressing  10/19/2024 2228 by Precious Kerr RN  Outcome: Progressing     Problem: Chronic Conditions and Co-morbidities  Goal: Patient's chronic conditions and co-morbidity symptoms are monitored and maintained or improved  Outcome: Progressing     Problem: Anxiety  Goal: Will report anxiety at manageable levels  Description: INTERVENTIONS:  1. Administer medication as ordered  2. Teach and rehearse alternative coping skills  3. Provide emotional support with 1:1 interaction with staff  10/20/2024 1133 by Deisy Velasquez RN  Outcome: Progressing  Flowsheets (Taken 10/20/2024 1056)  Will report anxiety at manageable levels: Administer medication as ordered  10/19/2024 2228 by Precious Kerr RN  Outcome: Progressing  Flowsheets (Taken 10/19/2024 1015 by Deisy Velasquez RN)  Will report anxiety at manageable levels: Administer medication as ordered     Problem: Coping  Goal: Pt/Family able to verbalize concerns and demonstrate effective coping strategies  Description: INTERVENTIONS:  1. Assist patient/family to identify coping skills, available support systems and cultural and spiritual values  2. Provide emotional support, including active listening and acknowledgement of concerns of patient and caregivers  3. Reduce  (including use of illegal substances)  2. Notify security of behavior or suspected illegal substances which indicate the need for search of the family and/or belongings  3. Encourage verbalization of thoughts and concerns in a socially appropriate manner  4. Utilize positive, consistent limit setting strategies supporting safety of patient, staff and others  5. Encourage participation in the decision making process about the behavioral management agreement  6. If a visitor's behavior poses a threat to safety call refer to organization policy.  7. Initiate consult with , Psychosocial CNS, Spiritual Care as appropriate  Outcome: Progressing     Problem: Depression/Self Harm  Goal: Effect of psychiatric condition will be minimized and patient will be protected from self harm  Description: INTERVENTIONS:  1. Assess impact of patient's symptoms on level of functioning, self care needs and offer support as indicated  2. Assess patient/family knowledge of depression, impact on illness and need for teaching  3. Provide emotional support, presence and reassurance  4. Assess for possible suicidal thoughts or ideation. If patient expresses suicidal thoughts or statements do not leave alone, initiate Suicide Precautions, move to a room close to the nursing station and obtain sitter  5. Initiate consults as appropriate with Mental Health Professional, Spiritual Care, Psychosocial CNS, and consider a recommendation to the [facility] for a Psychiatric Consultation  10/20/2024 1133 by Deisy Velasquez RN  Outcome: Progressing  Flowsheets (Taken 10/20/2024 1102)  Effect of psychiatric condition will be minimized and patient will be protected from self harm: Provide emotional support, presence and reassurance  10/19/2024 2228 by Precious Kerr RN  Outcome: Progressing  Flowsheets (Taken 10/19/2024 1649 by Deisy Velasquez RN)  Effect of psychiatric condition will be minimized and patient will be protected from self harm: Provide

## 2024-10-20 NOTE — BH NOTE
Affect blunt Mood depressed  Alert and oriented x 4.  Ate 50% of meals. Denies HI, however he says that he is still having suicidal ideations and that he can hear voices and see things he knows he shouldn't see or hear.  Med compliant. Denies SE's from meds.

## 2024-10-21 PROCEDURE — 6370000000 HC RX 637 (ALT 250 FOR IP): Performed by: INTERNAL MEDICINE

## 2024-10-21 PROCEDURE — 99232 SBSQ HOSP IP/OBS MODERATE 35: CPT | Performed by: PSYCHIATRY & NEUROLOGY

## 2024-10-21 PROCEDURE — 1240000000 HC EMOTIONAL WELLNESS R&B

## 2024-10-21 PROCEDURE — 6370000000 HC RX 637 (ALT 250 FOR IP): Performed by: NURSE PRACTITIONER

## 2024-10-21 PROCEDURE — 6370000000 HC RX 637 (ALT 250 FOR IP): Performed by: PSYCHIATRY & NEUROLOGY

## 2024-10-21 PROCEDURE — 6370000000 HC RX 637 (ALT 250 FOR IP): Performed by: HOSPITALIST

## 2024-10-21 RX ORDER — CALCIUM CARBONATE 500 MG/1
500 TABLET, CHEWABLE ORAL
Status: DISCONTINUED | OUTPATIENT
Start: 2024-10-21 | End: 2024-10-25 | Stop reason: HOSPADM

## 2024-10-21 RX ADMIN — GUAIFENESIN 600 MG: 600 TABLET ORAL at 08:54

## 2024-10-21 RX ADMIN — PANTOPRAZOLE SODIUM 40 MG: 40 TABLET, DELAYED RELEASE ORAL at 06:36

## 2024-10-21 RX ADMIN — TOPIRAMATE 50 MG: 25 TABLET, FILM COATED ORAL at 20:45

## 2024-10-21 RX ADMIN — FLUOXETINE HYDROCHLORIDE 20 MG: 20 CAPSULE ORAL at 08:54

## 2024-10-21 RX ADMIN — TRAZODONE HYDROCHLORIDE 200 MG: 100 TABLET ORAL at 20:45

## 2024-10-21 RX ADMIN — CALCIUM CARBONATE 500 MG: 500 TABLET, CHEWABLE ORAL at 11:11

## 2024-10-21 RX ADMIN — TOPIRAMATE 50 MG: 25 TABLET, FILM COATED ORAL at 15:53

## 2024-10-21 RX ADMIN — QUETIAPINE FUMARATE 800 MG: 200 TABLET ORAL at 20:44

## 2024-10-21 RX ADMIN — PANTOPRAZOLE SODIUM 40 MG: 40 TABLET, DELAYED RELEASE ORAL at 15:53

## 2024-10-21 RX ADMIN — GUAIFENESIN 600 MG: 600 TABLET ORAL at 20:44

## 2024-10-21 RX ADMIN — TOPIRAMATE 50 MG: 25 TABLET, FILM COATED ORAL at 08:55

## 2024-10-21 RX ADMIN — FLUTICASONE PROPIONATE 2 SPRAY: 50 SPRAY, METERED NASAL at 08:55

## 2024-10-21 RX ADMIN — ALUMINUM HYDROXIDE, MAGNESIUM HYDROXIDE, AND SIMETHICONE 30 ML: 1200; 120; 1200 SUSPENSION ORAL at 06:36

## 2024-10-21 RX ADMIN — FLUTICASONE PROPIONATE 2 SPRAY: 50 SPRAY, METERED NASAL at 20:49

## 2024-10-21 RX ADMIN — HALOPERIDOL 15 MG: 5 TABLET ORAL at 20:45

## 2024-10-21 RX ADMIN — SERTRALINE 100 MG: 100 TABLET, FILM COATED ORAL at 08:55

## 2024-10-21 RX ADMIN — CALCIUM CARBONATE 500 MG: 500 TABLET, CHEWABLE ORAL at 17:10

## 2024-10-21 RX ADMIN — GUAIFENESIN SYRUP AND DEXTROMETHORPHAN 10 ML: 100; 10 SYRUP ORAL at 20:57

## 2024-10-21 RX ADMIN — HALOPERIDOL 15 MG: 5 TABLET ORAL at 08:54

## 2024-10-21 RX ADMIN — ONDANSETRON 4 MG: 4 TABLET, ORALLY DISINTEGRATING ORAL at 03:03

## 2024-10-21 RX ADMIN — CALCIUM CARBONATE 500 MG: 500 TABLET, CHEWABLE ORAL at 03:40

## 2024-10-21 ASSESSMENT — PAIN DESCRIPTION - DESCRIPTORS: DESCRIPTORS: STABBING

## 2024-10-21 ASSESSMENT — PAIN SCALES - GENERAL
PAINLEVEL_OUTOF10: 8
PAINLEVEL_OUTOF10: 0
PAINLEVEL_OUTOF10: 4

## 2024-10-21 ASSESSMENT — PAIN DESCRIPTION - LOCATION: LOCATION: ABDOMEN

## 2024-10-21 ASSESSMENT — PAIN SCALES - WONG BAKER: WONGBAKER_NUMERICALRESPONSE: NO HURT

## 2024-10-21 NOTE — PLAN OF CARE
Benitez rates his anxiety level as 8/10 and depression as 9/10.  Does not want to discuss why anxiety is so high today.  He is still having AH.  He states he still has suicidal thoughts but has no plan and he is able to contract for safety.  Still c/o stomach pain which he rates as 8/10 and describes as stabbing pain.  Medication helps somewhat.  He still has not had BM in 5 days he states.  He was given Miralax again this evening.  Abdomen upon palpation was soft.

## 2024-10-21 NOTE — PLAN OF CARE
Problem: Self Harm/Suicidality  Goal: Will have no self-injury during hospital stay  Description: INTERVENTIONS:  1.  Ensure constant observer at bedside with Q15M safety checks  2.  Maintain a safe environment  3.  Secure patient belongings  4.  Ensure family/visitors adhere to safety recommendations  5.  Ensure safety tray has been added to patient's diet order  6.  Every shift and PRN: Re-assess suicidal risk via Frequent Screener    10/21/2024 1236 by Marleen Shay RN  Outcome: Progressing     Problem: Coping  Goal: Pt/Family able to verbalize concerns and demonstrate effective coping strategies  Description: INTERVENTIONS:  1. Assist patient/family to identify coping skills, available support systems and cultural and spiritual values  2. Provide emotional support, including active listening and acknowledgement of concerns of patient and caregivers  3. Reduce environmental stimuli, as able  4. Instruct patient/family in relaxation techniques, as appropriate  5. Assess for spiritual pain/suffering and initiate Spiritual Care, Psychosocial Clinical Specialist consults as needed  Outcome: Progressing     Problem: Confusion  Goal: Confusion, delirium, dementia, or psychosis is improved or at baseline  Description: INTERVENTIONS:  1. Assess for possible contributors to thought disturbance, including medications, impaired vision or hearing, underlying metabolic abnormalities, dehydration, psychiatric diagnoses, and notify attending LIP  2. [place] high risk fall precautions, as indicated  3. Provide frequent short contacts to provide reality reorientation, refocusing and direction  4. Decrease environmental stimuli, including noise as appropriate  5. Monitor and intervene to maintain adequate nutrition, hydration, elimination, sleep and activity  6. If unable to ensure safety without constant attention obtain sitter and review sitter guidelines with assigned personnel  7. Initiate Psychosocial CNS and Spiritual

## 2024-10-21 NOTE — PROGRESS NOTES
INTERVAL Hx:  Records and clinical information from the weekend were reviewed. States he's still not feeling that well overall, and that he's mostly having some unexplained GI pain. He says he's constipated, but he did have a BM which he said was emesis. However, he does appear to be in some mild distress, mostly reporting some nausea and possible cramping. I will therefore stop the Zoloft fully given that he's on the Prozac at 20 mg now. He still wants to stay with the Haldol and Seroquel, and no consider any other meds for the AH's, which are still strong. He's still eager to transition to a  Rehab, and he'll recontact the [facility] re: a referral. He's not as depressed now, but his mood is still a little low. The AH's are still strong and derogatory, but a little softer than PTA. No EPSE's or sedation, including no TD symptoms. Not as hopeless as he was PTA, and he denies having any SI now. Slept 4.75 hours last night.     MEDS:  Current Facility-Administered Medications   Medication Dose Route Frequency    calcium carbonate (TUMS) chewable tablet 500 mg  500 mg Oral TID WC    fluticasone (FLONASE) 50 MCG/ACT nasal spray 2 spray  2 spray Each Nostril BID    guaiFENesin (MUCINEX) extended release tablet 600 mg  600 mg Oral BID    FLUoxetine (PROZAC) capsule 20 mg  20 mg Oral Daily    ibuprofen (ADVIL;MOTRIN) tablet 600 mg  600 mg Oral Q8H PRN    ondansetron (ZOFRAN-ODT) disintegrating tablet 4 mg  4 mg Oral Q4H PRN    pantoprazole (PROTONIX) tablet 40 mg  40 mg Oral BID AC    haloperidol (HALDOL) tablet 15 mg  15 mg Oral BID    QUEtiapine (SEROQUEL) tablet 800 mg  800 mg Oral QHS    topiramate (TOPAMAX) tablet 50 mg  50 mg Oral TID    traZODone (DESYREL) tablet 200 mg  200 mg Oral Nightly    haloperidol (HALDOL) tablet 5 mg  5 mg Oral Q6H PRN    acetaminophen (TYLENOL) tablet 650 mg  650 mg Oral Q4H PRN    hydrOXYzine HCl (ATARAX) tablet 50 mg  50 mg Oral TID PRN    polyethylene glycol (GLYCOLAX) packet 17 g

## 2024-10-21 NOTE — BH NOTE
Behavioral Health Interdisciplinary Rounds     Patient Name: Benitez Harmon  Age: 54 y.o.  Room/Bed:  230/01  Primary Diagnosis: Schizoaffective disorder, bipolar type (HCC)   Admission Status: Voluntary     Readmission within 30 days: No  Power of  in place: No  Patient requires a blocked bed: No14}          Reason for blocked bed:     Sleep hours: 4.75       Participation in Care/Groups:  Yes  Medication Compliant?: Yes  PRNS (last 24 hours): Antianxiety    Restraints (last 24 hours):  No  Substance Abuse:  Yes    24 hour chart check complete: Yes    Patient goal(s) for today: to get accepted to program  Treatment team focus/goals:  Pt/Family able to verbalize concerns and demonstrate effective coping strategies   Progress note: clinicals faxed to rehab after follow up call    LOS:  12  Expected LOS: 1-3    Financial concerns/prescription coverage:  No  Family contact: no      Family requesting physician contact today:  No  Discharge plan: inpatient rehab for SA  Access to weapons: No       Outpatient provider(s): patient reports having a provider in [place]  Patient's preferred phone number for follow up call: [phone]     Participating treatment team members: Benitezyazan Harmon,Brigid Duque, Annette Duque, Veronica Lucero (assigned SW), Veronica Lucero

## 2024-10-21 NOTE — PROGRESS NOTES
Spiritual Health History and Assessment/Progress Note  [facility]    Follow-up,  ,  , Follow up     Name: Benitez Harmon MRN: [id number]    Age: 54 y.o.     Sex: male   Language: English   Anabaptist: Other   Schizoaffective disorder, bipolar type (HCC)     Date: 10/21/2024            Total Time Calculated: 22 min              Spiritual Assessment continued in [facility]        Referral/Consult From: Rounding   Encounter Overview/Reason: Follow-up  Service Provided For: Patient    [place], Belief, Meaning:   Patient identifies as spiritual  Family/Friends No family/friends present      Importance and Influence:  Patient has spiritual/personal beliefs that influence decisions regarding their health  Family/Friends No family/friends present    Community:  Patient feels well-supported. Support system includes: Parent/s  Family/Friends No family/friends present    Assessment and Plan of Care:     Patient Interventions include: Facilitated expression of thoughts and feelings and Provided sacramental/Restorationism ritual  Family/Friends Interventions include: No family/friends present    Patient Plan of Care: Spiritual Care available upon further referral  Family/Friends Plan of Care: No family/friends present    Electronically signed by Chaplain Dickson on 10/21/2024 at 2:35 PM

## 2024-10-21 NOTE — BH NOTE
Benitez with episode of N/V.  Vomited large amount of brown solid emesis.  Abdomen soft, non-tender, with sluggish bowel sounds.  Given 4mg Zofran S.L.

## 2024-10-21 NOTE — BH NOTE
This writer called [facility] regarding intake. Explained that staff has not called back. This writer was instructed to fax clinicals and they will contact patient. She stated they do have available beds, this writer will fax clinicals and patient is aware. Fax [phone].  Contacted [facility] and was instructed to fax clinicals which will be reviewed and staff will reach out to patient for intake.

## 2024-10-21 NOTE — BH NOTE
Patient presents flat and unkept. Patient is A & O X 4 and denies HI. Patient is positive for SI but denies having a plan at this moment and has no intent to act on it. Patient is also positive for AVH stating, \"The voices are telling me to harm myself,\" and \"I see people that are no longer here.\" Patient has been cooperative and medication compliant. Patient has spent most of the day in the day room speaking with staff and interacting with peers. Patient did not attend group today but does make needs known to staff. Patient shows no s/s of distress at this time.

## 2024-10-21 NOTE — GROUP NOTE
Group Therapy Note    Date: 10/21/2024    Benitez refused to come to group this morning. We will continue to encourage group attendance and participation as part of patient's recovery plan.      Signature:  Liset Lucero LCSW

## 2024-10-22 PROCEDURE — 1240000000 HC EMOTIONAL WELLNESS R&B

## 2024-10-22 PROCEDURE — 6370000000 HC RX 637 (ALT 250 FOR IP): Performed by: PSYCHIATRY & NEUROLOGY

## 2024-10-22 PROCEDURE — 99232 SBSQ HOSP IP/OBS MODERATE 35: CPT | Performed by: PSYCHIATRY & NEUROLOGY

## 2024-10-22 PROCEDURE — 6370000000 HC RX 637 (ALT 250 FOR IP): Performed by: NURSE PRACTITIONER

## 2024-10-22 RX ORDER — ACETAMINOPHEN 325 MG/1
650 TABLET ORAL EVERY 4 HOURS PRN
Status: DISCONTINUED | OUTPATIENT
Start: 2024-10-22 | End: 2024-10-25 | Stop reason: HOSPADM

## 2024-10-22 RX ADMIN — CALCIUM CARBONATE 500 MG: 500 TABLET, CHEWABLE ORAL at 08:37

## 2024-10-22 RX ADMIN — TRAZODONE HYDROCHLORIDE 200 MG: 100 TABLET ORAL at 20:36

## 2024-10-22 RX ADMIN — QUETIAPINE FUMARATE 800 MG: 200 TABLET ORAL at 20:36

## 2024-10-22 RX ADMIN — TOPIRAMATE 50 MG: 25 TABLET, FILM COATED ORAL at 20:36

## 2024-10-22 RX ADMIN — FLUTICASONE PROPIONATE 2 SPRAY: 50 SPRAY, METERED NASAL at 08:36

## 2024-10-22 RX ADMIN — ACETAMINOPHEN 650 MG: 325 TABLET ORAL at 16:53

## 2024-10-22 RX ADMIN — PANTOPRAZOLE SODIUM 40 MG: 40 TABLET, DELAYED RELEASE ORAL at 06:10

## 2024-10-22 RX ADMIN — GUAIFENESIN 600 MG: 600 TABLET ORAL at 20:36

## 2024-10-22 RX ADMIN — CALCIUM CARBONATE 500 MG: 500 TABLET, CHEWABLE ORAL at 16:53

## 2024-10-22 RX ADMIN — TOPIRAMATE 50 MG: 25 TABLET, FILM COATED ORAL at 08:37

## 2024-10-22 RX ADMIN — HALOPERIDOL 15 MG: 5 TABLET ORAL at 08:37

## 2024-10-22 RX ADMIN — TOPIRAMATE 50 MG: 25 TABLET, FILM COATED ORAL at 15:25

## 2024-10-22 RX ADMIN — CALCIUM CARBONATE 500 MG: 500 TABLET, CHEWABLE ORAL at 12:23

## 2024-10-22 RX ADMIN — FLUTICASONE PROPIONATE 2 SPRAY: 50 SPRAY, METERED NASAL at 20:37

## 2024-10-22 RX ADMIN — FLUOXETINE HYDROCHLORIDE 20 MG: 20 CAPSULE ORAL at 08:37

## 2024-10-22 RX ADMIN — HALOPERIDOL 15 MG: 5 TABLET ORAL at 20:36

## 2024-10-22 RX ADMIN — GUAIFENESIN 600 MG: 600 TABLET ORAL at 08:37

## 2024-10-22 RX ADMIN — PANTOPRAZOLE SODIUM 40 MG: 40 TABLET, DELAYED RELEASE ORAL at 15:25

## 2024-10-22 ASSESSMENT — PAIN SCALES - GENERAL
PAINLEVEL_OUTOF10: 1
PAINLEVEL_OUTOF10: 3
PAINLEVEL_OUTOF10: 0

## 2024-10-22 ASSESSMENT — PAIN DESCRIPTION - LOCATION: LOCATION: HEAD

## 2024-10-22 ASSESSMENT — PAIN DESCRIPTION - DESCRIPTORS: DESCRIPTORS: ACHING

## 2024-10-22 NOTE — PLAN OF CARE
Problem: Self Harm/Suicidality  Goal: Will have no self-injury during hospital stay  Description: INTERVENTIONS:  1.  Ensure constant observer at bedside with Q15M safety checks  2.  Maintain a safe environment  3.  Secure patient belongings  4.  Ensure family/visitors adhere to safety recommendations  5.  Ensure safety tray has been added to patient's diet order  6.  Every shift and PRN: Re-assess suicidal risk via Frequent Screener    Outcome: Progressing     Problem: Pain  Goal: Verbalizes/displays adequate comfort level or baseline comfort level  Outcome: Progressing     Problem: Coping  Goal: Pt/Family able to verbalize concerns and demonstrate effective coping strategies  Description: INTERVENTIONS:  1. Assist patient/family to identify coping skills, available support systems and cultural and spiritual values  2. Provide emotional support, including active listening and acknowledgement of concerns of patient and caregivers  3. Reduce environmental stimuli, as able  4. Instruct patient/family in relaxation techniques, as appropriate  5. Assess for spiritual pain/suffering and initiate Spiritual Care, Psychosocial Clinical Specialist consults as needed  Outcome: Progressing     Problem: Depression/Self Harm  Goal: Effect of psychiatric condition will be minimized and patient will be protected from self harm  Description: INTERVENTIONS:  1. Assess impact of patient's symptoms on level of functioning, self care needs and offer support as indicated  2. Assess patient/family knowledge of depression, impact on illness and need for teaching  3. Provide emotional support, presence and reassurance  4. Assess for possible suicidal thoughts or ideation. If patient expresses suicidal thoughts or statements do not leave alone, initiate Suicide Precautions, move to a room close to the nursing station and obtain sitter  5. Initiate consults as appropriate with Mental Health Professional, Spiritual Care, Psychosocial CNS,  and consider a recommendation to the LIP for a Psychiatric Consultation  Recent Flowsheet Documentation  Taken 10/22/2024 1040 by Marleen Shay RN  Effect of psychiatric condition will be minimized and patient will be protected from self harm: Provide emotional support, presence and reassurance

## 2024-10-22 NOTE — BH NOTE
Affect constricted, mood depressed/anxious. Alert and oriented X 2. Cooperative and pleasant. Attended and participated in group. Interacted with staff and peers. Makes needs known. Ate all meals and snacks. Denied SI/HI/AVH and pain. Medication compliant. Stable with some congestion noted.  PRN Medication Documentation    Specific patient behavior that led to need for PRN medication: Pt asked for cough medication as he reports he has mucus stuck in his throat.  Staff interventions attempted prior to PRN being given: noted patient coughing.  PRN medication given: 2057 Robitussin 10 ml given for cough.  Patient response/effectiveness of PRN medication: Coughing subsided soon thereafter.

## 2024-10-22 NOTE — PROGRESS NOTES
INTERVAL Hx:  Records and clinical information were reviewed. States he's feeling \"a lot better\" after staff spoke with his father who will bring some belongings up here prior to him going to SA Rehab Tx. That seemed to ease his anxiety a bit, and he now reports no feelings of hopelessness, and no SI at all. He also states that the AH's are not as bothersome, and he does not appear to be internally distracted. He's eager to go to inpatient Rehab and is very motivated to gets some help with his addiction issues. Still fearful that he would relapse if D/C'ed home despite having good support there. Not having the somatic complaints either, and he states his bowels are moving fine now.  Tolerating the switch to Prozac at 20 mg--no SE's noted or reported. Also tolerating the Haldol and Seroquel with no SE's noted--no EPSE's or sedation, including no TD symptoms. Slept 6.5 hours last night.     MEDS:  Current Facility-Administered Medications   Medication Dose Route Frequency    calcium carbonate (TUMS) chewable tablet 500 mg  500 mg Oral TID WC    fluticasone (FLONASE) 50 MCG/ACT nasal spray 2 spray  2 spray Each Nostril BID    guaiFENesin (MUCINEX) extended release tablet 600 mg  600 mg Oral BID    FLUoxetine (PROZAC) capsule 20 mg  20 mg Oral Daily    ibuprofen (ADVIL;MOTRIN) tablet 600 mg  600 mg Oral Q8H PRN    ondansetron (ZOFRAN-ODT) disintegrating tablet 4 mg  4 mg Oral Q4H PRN    pantoprazole (PROTONIX) tablet 40 mg  40 mg Oral BID AC    haloperidol (HALDOL) tablet 15 mg  15 mg Oral BID    QUEtiapine (SEROQUEL) tablet 800 mg  800 mg Oral QHS    topiramate (TOPAMAX) tablet 50 mg  50 mg Oral TID    traZODone (DESYREL) tablet 200 mg  200 mg Oral Nightly    haloperidol (HALDOL) tablet 5 mg  5 mg Oral Q6H PRN    acetaminophen (TYLENOL) tablet 650 mg  650 mg Oral Q4H PRN    hydrOXYzine HCl (ATARAX) tablet 50 mg  50 mg Oral TID PRN    polyethylene glycol (GLYCOLAX) packet 17 g  17 g Oral Daily PRN    nicotine (NICODERM

## 2024-10-22 NOTE — GROUP NOTE
900                                                                      Group Therapy Note    Date: 10/22/2024    Group Start Time: 0900  Group End Time: 0950  Group Topic: Process Group - Inpatient    [facility]    Ibeth Taylor LCSW        Group Therapy Note    Attendees: 6 scheduled    Focus of session was on the importance of identifying triggers that lead to our negative emotions and then allowing ourselves the opportunity to have time to work through those emotions in a positive and productive way.  We spoke about how we have to do our best to foresee situations where our emotions may be triggered and have many choices for how to cope in response to those emotions.  We spoke about planning and using the phrases “first I will, then I will, next I will, and finally I will…”  We spoke about how we will usually give ourselves enough time to work through the triggers through the process of taking time to work through four coping skills that we decide to use.          Patient's Goal:  Will report anxiety at manageable levels     Notes:  Pt attended group with prompting.  He listened to the activity and discussion among others.  He was cooperative and attentive engaging at a minimum when prompted.      Status After Intervention:  Improved    Participation Level: Minimal    Participation Quality: Appropriate and Attentive      Speech:  normal      Thought Process/Content: Logical      Affective Functioning: Congruent      Mood: euthymic      Level of consciousness:  Alert and Attentive      Response to Learning: Able to verbalize current knowledge/experience      Endings: None Reported    Modes of Intervention: Education, Support, Socialization, Exploration, and Clarifying      Discipline Responsible: /Counselor      Signature:  Ibeth Taylor LCSW

## 2024-10-22 NOTE — BH NOTE
Patient remains flat and unkept. Patient denies HI but is positive for SI but stated he has no plan or intent on acting on those thoughts. Patient also continues to be positive for AVH stating, \"The voices are still telling me to kill myself,\" and \"I still see people that are no longer here.\" Patient has remained cooperative, medication compliant and makes needs known to staff. Patient has consumed % of all meals and has had adequate fluid intake. Patient did attend group and spent half the day in the dayroom. Patient shows no s/s of distress at this time.       PRN Medication Documentation     Specific patient behavior that led to the need for PRN medication: pain   Staff interventions attempted prior to PRN being given: patient requested  PRN medication given: Tylenol 650 mg PO @ 1653  Patient responsiveness/effectiveness of PRN medication: pt satisfied

## 2024-10-22 NOTE — BH NOTE
[facility] called back to deny patient stating he was not mentally stable. This writer also spoke with patients father and will be bringing belongings to patient. This writer will continue to call other facilities and try to represent back to [facility] once the AH clear.

## 2024-10-22 NOTE — BH NOTE
[facility] called to speak with client and call was transferred to this writer. Called back to explain and asked if they can call patient back.

## 2024-10-22 NOTE — PLAN OF CARE
Problem: Self Harm/Suicidality  Goal: Will have no self-injury during hospital stay  Description: INTERVENTIONS:  1.  Ensure constant observer at bedside with Q15M safety checks  2.  Maintain a safe environment  3.  Secure patient belongings  4.  Ensure family/visitors adhere to safety recommendations  5.  Ensure safety tray has been added to patient's diet order  6.  Every shift and PRN: Re-assess suicidal risk via Frequent Screener       Problem: Pain  Goal: Verbalizes/displays adequate comfort level or baseline comfort level  Outcome: Progressing     Problem: Anxiety  Goal: Will report anxiety at manageable levels  Description: INTERVENTIONS:  1. Administer medication as ordered  2. Teach and rehearse alternative coping skills  3. Provide emotional support with 1:1 interaction with staff  Outcome: Progressing

## 2024-10-23 PROCEDURE — 99232 SBSQ HOSP IP/OBS MODERATE 35: CPT | Performed by: PSYCHIATRY & NEUROLOGY

## 2024-10-23 PROCEDURE — 6370000000 HC RX 637 (ALT 250 FOR IP): Performed by: PSYCHIATRY & NEUROLOGY

## 2024-10-23 PROCEDURE — 6370000000 HC RX 637 (ALT 250 FOR IP): Performed by: HOSPITALIST

## 2024-10-23 PROCEDURE — 1240000000 HC EMOTIONAL WELLNESS R&B

## 2024-10-23 PROCEDURE — 6370000000 HC RX 637 (ALT 250 FOR IP): Performed by: NURSE PRACTITIONER

## 2024-10-23 RX ADMIN — FLUTICASONE PROPIONATE 2 SPRAY: 50 SPRAY, METERED NASAL at 09:13

## 2024-10-23 RX ADMIN — HALOPERIDOL 15 MG: 5 TABLET ORAL at 09:13

## 2024-10-23 RX ADMIN — CALCIUM CARBONATE 500 MG: 500 TABLET, CHEWABLE ORAL at 09:14

## 2024-10-23 RX ADMIN — ONDANSETRON 4 MG: 4 TABLET, ORALLY DISINTEGRATING ORAL at 11:52

## 2024-10-23 RX ADMIN — TOPIRAMATE 50 MG: 25 TABLET, FILM COATED ORAL at 09:13

## 2024-10-23 RX ADMIN — FLUOXETINE HYDROCHLORIDE 20 MG: 20 CAPSULE ORAL at 09:14

## 2024-10-23 RX ADMIN — CALCIUM CARBONATE 500 MG: 500 TABLET, CHEWABLE ORAL at 17:48

## 2024-10-23 RX ADMIN — FLUTICASONE PROPIONATE 2 SPRAY: 50 SPRAY, METERED NASAL at 20:59

## 2024-10-23 RX ADMIN — ALUMINUM HYDROXIDE, MAGNESIUM HYDROXIDE, AND SIMETHICONE 30 ML: 1200; 120; 1200 SUSPENSION ORAL at 23:22

## 2024-10-23 RX ADMIN — TOPIRAMATE 50 MG: 25 TABLET, FILM COATED ORAL at 17:50

## 2024-10-23 RX ADMIN — PANTOPRAZOLE SODIUM 40 MG: 40 TABLET, DELAYED RELEASE ORAL at 17:48

## 2024-10-23 RX ADMIN — TRAZODONE HYDROCHLORIDE 200 MG: 100 TABLET ORAL at 20:59

## 2024-10-23 RX ADMIN — PANTOPRAZOLE SODIUM 40 MG: 40 TABLET, DELAYED RELEASE ORAL at 06:15

## 2024-10-23 RX ADMIN — GUAIFENESIN 600 MG: 600 TABLET ORAL at 09:14

## 2024-10-23 RX ADMIN — QUETIAPINE FUMARATE 800 MG: 200 TABLET ORAL at 20:59

## 2024-10-23 RX ADMIN — CALCIUM CARBONATE 500 MG: 500 TABLET, CHEWABLE ORAL at 11:52

## 2024-10-23 RX ADMIN — HALOPERIDOL 15 MG: 5 TABLET ORAL at 20:59

## 2024-10-23 RX ADMIN — GUAIFENESIN 600 MG: 600 TABLET ORAL at 20:59

## 2024-10-23 RX ADMIN — TOPIRAMATE 50 MG: 25 TABLET, FILM COATED ORAL at 20:59

## 2024-10-23 ASSESSMENT — PAIN SCALES - GENERAL: PAINLEVEL_OUTOF10: 7

## 2024-10-23 ASSESSMENT — PAIN DESCRIPTION - LOCATION: LOCATION: ABDOMEN

## 2024-10-23 ASSESSMENT — PAIN DESCRIPTION - ORIENTATION: ORIENTATION: INNER

## 2024-10-23 NOTE — BH NOTE
Patient has a 10:30am assessment with [facility] on Thursday. Patient's father brought his belongings today for patient.

## 2024-10-23 NOTE — GROUP NOTE
Group Therapy Note    Date: 10/23/2024    Group Start Time: 0900  Group End Time: 0950  Group Topic: Process Group - Inpatient    [facility]    Nic, Liset JENKINS, LCSW        Group Therapy Note    Attendees: 4 scheduled    Focus of session was on the importance of learning how to tolerate and manage internal tension.  We spoke about how this is often where we get into trouble when we are trying to make a change.  A remedy to resolving internal tension is to find a “quick fix” which works in the short term but has long term effects and consequences.  We spoke about our quick fixes and their consequences and what are we not tolerating internally.  We discussed how we are programmed to believe we should not feel uncomfortable or in pain and we spoke how we can find the right balance of tension that can drive us towards positive change but not be too much that we are incapacitated because of it.         Patient's Goal:  Pt/Family able to verbalize concerns and demonstrate effective coping strategies     Notes:  Benitez participated in group well. He spoke about how he is frustrated with his relapse and it was over something \"so stupid with my fathers girlfriend.\"  He spoke about how he can now see if was stupid but he stated \"when I get angry, I get stubborn.\"  We spoke about some strategies that are more action oriented to help him get through painful emotional states.  We spoke about how he can take a walk and listen to music. He shared how he was going to go \"get weed to calm down but the dope chapis got me first and I went on a three day park.\"  He was expressing motivation to \"get things together\" and not continue to use and he wants to work on \"not letting the small stuff become a major problem in my life.\"      Status After Intervention:  Improved    Participation Level: Active Listener and Interactive    Participation Quality:

## 2024-10-23 NOTE — BH NOTE
[facility] called back to say that they will try to prioritize patient's assessment today at the latest it will be in the morning. Patient wanted this writer to call back the facility in [place]. Explained to him that they just denied him and we only have 2 days of notes to reflect improvement and it would be too soon. Presented information on [facility], he declined due to being scared of flying. This would limit facilities only in [place] and they are limited due to insurance. Encouraged him to call [facility], he said he is to embarrassed, explained to patient that he is not the only one that has relapsed and we cannot stay in the hospital and only wait on 1 facility we must call others.

## 2024-10-23 NOTE — PROGRESS NOTES
INTERVAL Hx:  Records and clinical information were reviewed. States he's feeling \"a lot better\" again today, and that the AH's are much quieter and not bothersome (back to baseline), and the SI is also essentially gone. He's not had any true SI or desire to die, just that the AH's would tell him that and he interpreted it as his own thought. His mood is brighter, and he's eager to transition to a SA program. Still believes that he may not be able to stay clean/sober on his own, even with his father's support. Not having any of the somatic complaints now, either--bowels are moving fine, no abdominal pain, etc. Tolerating the switch to Prozac at 20 mg and no SE's noted or reported with any of the meds--no EPSE's, sedation, TD symptoms, etc. Slept 8 hours last night.     MEDS:  Current Facility-Administered Medications   Medication Dose Route Frequency    acetaminophen (TYLENOL) tablet 650 mg  650 mg Oral Q4H PRN    calcium carbonate (TUMS) chewable tablet 500 mg  500 mg Oral TID WC    fluticasone (FLONASE) 50 MCG/ACT nasal spray 2 spray  2 spray Each Nostril BID    guaiFENesin (MUCINEX) extended release tablet 600 mg  600 mg Oral BID    FLUoxetine (PROZAC) capsule 20 mg  20 mg Oral Daily    ibuprofen (ADVIL;MOTRIN) tablet 600 mg  600 mg Oral Q8H PRN    ondansetron (ZOFRAN-ODT) disintegrating tablet 4 mg  4 mg Oral Q4H PRN    pantoprazole (PROTONIX) tablet 40 mg  40 mg Oral BID AC    haloperidol (HALDOL) tablet 15 mg  15 mg Oral BID    QUEtiapine (SEROQUEL) tablet 800 mg  800 mg Oral QHS    topiramate (TOPAMAX) tablet 50 mg  50 mg Oral TID    traZODone (DESYREL) tablet 200 mg  200 mg Oral Nightly    haloperidol (HALDOL) tablet 5 mg  5 mg Oral Q6H PRN    hydrOXYzine HCl (ATARAX) tablet 50 mg  50 mg Oral TID PRN    polyethylene glycol (GLYCOLAX) packet 17 g  17 g Oral Daily PRN    nicotine (NICODERM CQ) 14 MG/24HR 1 patch  1 patch TransDERmal Daily    aluminum & magnesium hydroxide-simethicone (MAALOX) 200-200-20 MG/5ML

## 2024-10-23 NOTE — BH NOTE
Rec'd in dayroom.  AAOx4.  Endorses SI - no plan to act.  Denies HI.  AVH+ sees shadows, silhouettes of people that have passed away in his past.  Calm and cooperative at present.  Medication compliant/treatment compliant at present.  Interacting appropriately with peers/staff at present.  Able to make needs known.  No s/s of distress noted.  Denies pain/discomfort at present.  Will continue to monitor Q 15 min checks (BHT)/Q 1 hr check (RN).

## 2024-10-23 NOTE — BH NOTE
Affect blunt Mood depressed, He is  Alert and oriented x4.  Ate 70 % of meals.  Denies HI/ pain. He says he still can hear voices sometimes, and still has thoughts about committing suicide but has no definite plan to do so. He is Meds compliant, cooperative and Denies SE's from meds. He didn't need any PRNs.

## 2024-10-23 NOTE — BH NOTE
Behavioral Health Interdisciplinary Rounds     Patient Name: Benitez Harmon  Age: 54 y.o.  Room/Bed:  230/01  Primary Diagnosis: Schizoaffective disorder, bipolar type (HCC)   Admission Status: Voluntary     Readmission within 30 days: No  Power of  in place: No  Patient requires a blocked bed: No14}          Reason for blocked bed:     Sleep hours: 8       Participation in Care/Groups:  Yes  Medication Compliant?: Yes  PRNS (last 24 hours): None    Restraints (last 24 hours):  No  Substance Abuse:  Yes    24 hour chart check complete: Yes    Patient goal(s) for today: to get into a rehab  Treatment team focus/goals:  Discharge to home or other facility with appropriate resources   Progress note: this writer continues to assist with placement into rehab    LOS:  14  Expected LOS: 2-4    Financial concerns/prescription coverage:  No  Family contact: yes      Family requesting physician contact today:  No  Discharge plan: would like to go to a rehab  Access to weapons: No       Outpatient provider(s): yes  Patient's preferred phone number for follow up call: [phone]     Participating treatment team members: Benitez Harmon, Brigid Duque, Veronica Lucero, Dr. Bedoya(assigned SW), Veronica Lucero

## 2024-10-23 NOTE — PLAN OF CARE
Problem: Discharge Planning  Goal: Discharge to home or other facility with appropriate resources  Outcome: Progressing  Flowsheets (Taken 10/22/2024 2006)  Discharge to home or other facility with appropriate resources: Identify barriers to discharge with patient and caregiver     Problem: Self Harm/Suicidality  Goal: Will have no self-injury during hospital stay  Description: INTERVENTIONS:  1.  Ensure constant observer at bedside with Q15M safety checks  2.  Maintain a safe environment  3.  Secure patient belongings  4.  Ensure family/visitors adhere to safety recommendations  5.  Ensure safety tray has been added to patient's diet order  6.  Every shift and PRN: Re-assess suicidal risk via Frequent Screener    10/22/2024 2158 by Deisy Velasquez RN  Outcome: Progressing  10/22/2024 2157 by Deisy Velasquez RN  Outcome: Progressing  10/22/2024 1251 by Marleen Shay RN  Outcome: Progressing     Problem: Pain  Goal: Verbalizes/displays adequate comfort level or baseline comfort level  10/22/2024 2158 by Deisy Velasquez RN  Outcome: Progressing  10/22/2024 2157 by Deisy Velasquez RN  Outcome: Progressing  10/22/2024 1251 by Marleen Shay RN  Outcome: Progressing     Problem: Anxiety  Goal: Will report anxiety at manageable levels  Description: INTERVENTIONS:  1. Administer medication as ordered  2. Teach and rehearse alternative coping skills  3. Provide emotional support with 1:1 interaction with staff  Outcome: Progressing     Problem: Coping  Goal: Pt/Family able to verbalize concerns and demonstrate effective coping strategies  Description: INTERVENTIONS:  1. Assist patient/family to identify coping skills, available support systems and cultural and spiritual values  2. Provide emotional support, including active listening and acknowledgement of concerns of patient and caregivers  3. Reduce environmental stimuli, as able  4. Instruct patient/family in relaxation techniques, as appropriate  5. Assess for

## 2024-10-24 PROCEDURE — 6370000000 HC RX 637 (ALT 250 FOR IP): Performed by: NURSE PRACTITIONER

## 2024-10-24 PROCEDURE — 1240000000 HC EMOTIONAL WELLNESS R&B

## 2024-10-24 PROCEDURE — 6370000000 HC RX 637 (ALT 250 FOR IP): Performed by: PSYCHIATRY & NEUROLOGY

## 2024-10-24 PROCEDURE — 99232 SBSQ HOSP IP/OBS MODERATE 35: CPT | Performed by: PSYCHIATRY & NEUROLOGY

## 2024-10-24 RX ORDER — PANTOPRAZOLE SODIUM 40 MG/1
40 TABLET, DELAYED RELEASE ORAL
Status: COMPLETED | OUTPATIENT
Start: 2024-10-24 | End: 2024-10-24

## 2024-10-24 RX ADMIN — GUAIFENESIN 600 MG: 600 TABLET ORAL at 09:12

## 2024-10-24 RX ADMIN — CALCIUM CARBONATE 500 MG: 500 TABLET, CHEWABLE ORAL at 17:01

## 2024-10-24 RX ADMIN — QUETIAPINE FUMARATE 800 MG: 200 TABLET ORAL at 20:23

## 2024-10-24 RX ADMIN — GUAIFENESIN SYRUP AND DEXTROMETHORPHAN 10 ML: 100; 10 SYRUP ORAL at 09:12

## 2024-10-24 RX ADMIN — TOPIRAMATE 50 MG: 25 TABLET, FILM COATED ORAL at 20:23

## 2024-10-24 RX ADMIN — HALOPERIDOL 15 MG: 5 TABLET ORAL at 09:11

## 2024-10-24 RX ADMIN — CALCIUM CARBONATE 500 MG: 500 TABLET, CHEWABLE ORAL at 09:11

## 2024-10-24 RX ADMIN — TOPIRAMATE 50 MG: 25 TABLET, FILM COATED ORAL at 15:37

## 2024-10-24 RX ADMIN — FLUOXETINE HYDROCHLORIDE 20 MG: 20 CAPSULE ORAL at 09:11

## 2024-10-24 RX ADMIN — TRAZODONE HYDROCHLORIDE 200 MG: 100 TABLET ORAL at 20:23

## 2024-10-24 RX ADMIN — PANTOPRAZOLE SODIUM 40 MG: 40 TABLET, DELAYED RELEASE ORAL at 01:50

## 2024-10-24 RX ADMIN — CALCIUM CARBONATE 500 MG: 500 TABLET, CHEWABLE ORAL at 11:40

## 2024-10-24 RX ADMIN — PANTOPRAZOLE SODIUM 40 MG: 40 TABLET, DELAYED RELEASE ORAL at 15:37

## 2024-10-24 RX ADMIN — FLUTICASONE PROPIONATE 2 SPRAY: 50 SPRAY, METERED NASAL at 20:23

## 2024-10-24 RX ADMIN — TOPIRAMATE 50 MG: 25 TABLET, FILM COATED ORAL at 09:12

## 2024-10-24 RX ADMIN — FLUTICASONE PROPIONATE 2 SPRAY: 50 SPRAY, METERED NASAL at 09:13

## 2024-10-24 RX ADMIN — PANTOPRAZOLE SODIUM 40 MG: 40 TABLET, DELAYED RELEASE ORAL at 09:12

## 2024-10-24 RX ADMIN — HALOPERIDOL 15 MG: 5 TABLET ORAL at 20:23

## 2024-10-24 RX ADMIN — CALCIUM CARBONATE 500 MG: 500 TABLET, CHEWABLE ORAL at 21:27

## 2024-10-24 RX ADMIN — ACETAMINOPHEN 650 MG: 325 TABLET ORAL at 01:50

## 2024-10-24 RX ADMIN — GUAIFENESIN 600 MG: 600 TABLET ORAL at 20:23

## 2024-10-24 ASSESSMENT — PAIN - FUNCTIONAL ASSESSMENT: PAIN_FUNCTIONAL_ASSESSMENT: ACTIVITIES ARE NOT PREVENTED

## 2024-10-24 ASSESSMENT — PAIN SCALES - GENERAL
PAINLEVEL_OUTOF10: 0
PAINLEVEL_OUTOF10: 8
PAINLEVEL_OUTOF10: 0

## 2024-10-24 ASSESSMENT — PAIN DESCRIPTION - LOCATION: LOCATION: ABDOMEN

## 2024-10-24 ASSESSMENT — PAIN DESCRIPTION - DESCRIPTORS: DESCRIPTORS: ACHING

## 2024-10-24 NOTE — BH NOTE
Patient 's Affect is  blunted, his mood  depressed, he complained of abdominal discomfort and acid reflux most hours of the night, Dr. Bedoya was consulted and he put an order for Protonix 40mg. He however reports that he is not hearing voices as before and so he is able to sleep better. He is cooperative, meds compliant and makes needs known to staff.    PRN Medication Documentation    Specific patient behavior that led to need for PRN medication: Abdominal pain and acid reflux  Staff interventions attempted prior to PRN being given: assessment, patient education  PRN medication given: Maalox 30mls  Patient response/effectiveness of PRN medication: pt satisfied    PRN Medication Documentation    Specific patient behavior that led to need for PRN medication: Abdominal pain  Staff interventions attempted prior to PRN being given: assessment, patient education  PRN medication given: Tylenol 650mg @0150am  Patient response/effectiveness of PRN medication: condition improved and patient slept

## 2024-10-24 NOTE — PROGRESS NOTES
INTERVAL Hx:  Records and clinical information were reviewed. States he's still feeling \"much better\" again today, and that the AH's are much quieter and not bothersome at all (back to baseline). He's also not having any SI, and he clarified that he never wanted to end his life recently, just that the AH's put that thought In his head and he interpreted it as his own thought. His mood is brighter, and he's eager to transition to a SA program. Still believes that he may not be able to stay clean/sober on his own, even with his father's support. Not having any of the somatic complaints now, although he had some stronger abdominal cramping last night which is better this AM. Tolerating the switch to Prozac at 20 mg and no SE's noted or reported with any of the meds--no EPSE's, sedation, TD symptoms, etc. Slept 6 hours last night.     MEDS:  Current Facility-Administered Medications   Medication Dose Route Frequency    acetaminophen (TYLENOL) tablet 650 mg  650 mg Oral Q4H PRN    calcium carbonate (TUMS) chewable tablet 500 mg  500 mg Oral TID WC    fluticasone (FLONASE) 50 MCG/ACT nasal spray 2 spray  2 spray Each Nostril BID    guaiFENesin (MUCINEX) extended release tablet 600 mg  600 mg Oral BID    FLUoxetine (PROZAC) capsule 20 mg  20 mg Oral Daily    ibuprofen (ADVIL;MOTRIN) tablet 600 mg  600 mg Oral Q8H PRN    ondansetron (ZOFRAN-ODT) disintegrating tablet 4 mg  4 mg Oral Q4H PRN    pantoprazole (PROTONIX) tablet 40 mg  40 mg Oral BID AC    haloperidol (HALDOL) tablet 15 mg  15 mg Oral BID    QUEtiapine (SEROQUEL) tablet 800 mg  800 mg Oral QHS    topiramate (TOPAMAX) tablet 50 mg  50 mg Oral TID    traZODone (DESYREL) tablet 200 mg  200 mg Oral Nightly    haloperidol (HALDOL) tablet 5 mg  5 mg Oral Q6H PRN    hydrOXYzine HCl (ATARAX) tablet 50 mg  50 mg Oral TID PRN    polyethylene glycol (GLYCOLAX) packet 17 g  17 g Oral Daily PRN    nicotine (NICODERM CQ) 14 MG/24HR 1 patch  1 patch TransDERmal Daily

## 2024-10-24 NOTE — PLAN OF CARE
Problem: Discharge Planning  Goal: Discharge to home or other facility with appropriate resources  10/23/2024 2256 by Deisy Velasquez RN  Outcome: Progressing  Flowsheets (Taken 10/23/2024 2003)  Discharge to home or other facility with appropriate resources: Identify barriers to discharge with patient and caregiver  10/23/2024 1737 by Yina King RN  Outcome: Progressing     Problem: Self Harm/Suicidality  Goal: Will have no self-injury during hospital stay  Description: INTERVENTIONS:  1.  Ensure constant observer at bedside with Q15M safety checks  2.  Maintain a safe environment  3.  Secure patient belongings  4.  Ensure family/visitors adhere to safety recommendations  5.  Ensure safety tray has been added to patient's diet order  6.  Every shift and PRN: Re-assess suicidal risk via Frequent Screener    10/23/2024 2256 by Deisy Velasquez RN  Outcome: Progressing  10/23/2024 1737 by Yina King RN  Outcome: Progressing     Problem: Pain  Goal: Verbalizes/displays adequate comfort level or baseline comfort level  10/23/2024 2256 by Deisy Velasquez RN  Outcome: Progressing  10/23/2024 1737 by Yina King RN  Outcome: Progressing     Problem: ABCDS Injury Assessment  Goal: Absence of physical injury  10/23/2024 1737 by Yina King RN  Outcome: Progressing     Problem: Chronic Conditions and Co-morbidities  Goal: Patient's chronic conditions and co-morbidity symptoms are monitored and maintained or improved  10/23/2024 2256 by Deisy Velasquez RN  Outcome: Progressing  Flowsheets (Taken 10/23/2024 2003)  Care Plan - Patient's Chronic Conditions and Co-Morbidity Symptoms are Monitored and Maintained or Improved: Monitor and assess patient's chronic conditions and comorbid symptoms for stability, deterioration, or improvement  10/23/2024 1737 by Yina King RN  Outcome: Progressing     Problem: Anxiety  Goal: Will report anxiety at manageable levels  Description: INTERVENTIONS:  1.  Administer medication as ordered  2. Teach and rehearse alternative coping skills  3. Provide emotional support with 1:1 interaction with staff  10/23/2024 2256 by Deisy Velasquez RN  Outcome: Progressing  Flowsheets (Taken 10/23/2024 2003)  Will report anxiety at manageable levels: Administer medication as ordered  10/23/2024 1737 by Yina King RN  Outcome: Progressing     Problem: Coping  Goal: Pt/Family able to verbalize concerns and demonstrate effective coping strategies  Description: INTERVENTIONS:  1. Assist patient/family to identify coping skills, available support systems and cultural and spiritual values  2. Provide emotional support, including active listening and acknowledgement of concerns of patient and caregivers  3. Reduce environmental stimuli, as able  4. Instruct patient/family in relaxation techniques, as appropriate  5. Assess for spiritual pain/suffering and initiate Spiritual Care, Psychosocial Clinical Specialist consults as needed  10/23/2024 2256 by Deisy Velasquez RN  Outcome: Progressing  10/23/2024 1737 by Yina King RN  Outcome: Progressing     Problem: Decision Making  Goal: Pt/Family able to effectively weigh alternatives and participate in decision making related to treatment and care  Description: INTERVENTIONS:  1. Determine when there are differences between patient's view, family's view, and healthcare provider's view of condition  2. Facilitate patient and family articulation of goals for care  3. Help patient and family identify pros/cons of alternative solutions  4. Provide information as requested by patient/family  5. Respect patient/family right to receive or not to receive information  6. Serve as a liaison between patient and family and health care team  7. Initiate Consults from Ethics, Palliative Care or initiate Family Care Conference as is appropriate  10/23/2024 1737 by Yina King RN  Outcome: Progressing     Problem: Confusion  Goal:

## 2024-10-24 NOTE — PLAN OF CARE
Problem: Discharge Planning  Goal: Discharge to home or other facility with appropriate resources  Outcome: Progressing  Flowsheets (Taken 10/24/2024 0826 by Perri Pal, RN)  Discharge to home or other facility with appropriate resources: Identify barriers to discharge with patient and caregiver     Problem: Self Harm/Suicidality  Goal: Will have no self-injury during hospital stay  Description: INTERVENTIONS:  1.  Ensure constant observer at bedside with Q15M safety checks  2.  Maintain a safe environment  3.  Secure patient belongings  4.  Ensure family/visitors adhere to safety recommendations  5.  Ensure safety tray has been added to patient's diet order  6.  Every shift and PRN: Re-assess suicidal risk via Frequent Screener    10/24/2024 1949 by Ant Ray RN  Outcome: Progressing     Problem: Pain  Goal: Verbalizes/displays adequate comfort level or baseline comfort level  10/24/2024 1949 by Ant Ray, RN  Outcome: Progressing     Problem: ABCDS Injury Assessment  Goal: Absence of physical injury  10/24/2024 1949 by Ant Ray RN  Outcome: Progressing     Problem: Chronic Conditions and Co-morbidities  Goal: Patient's chronic conditions and co-morbidity symptoms are monitored and maintained or improved  Outcome: Progressing  Flowsheets (Taken 10/24/2024 0826 by Perri Pal, RN)  Care Plan - Patient's Chronic Conditions and Co-Morbidity Symptoms are Monitored and Maintained or Improved: Monitor and assess patient's chronic conditions and comorbid symptoms for stability, deterioration, or improvement     Problem: Anxiety  Goal: Will report anxiety at manageable levels  Description: INTERVENTIONS:  1. Administer medication as ordered  2. Teach and rehearse alternative coping skills  3. Provide emotional support with 1:1 interaction with staff  10/24/2024 1949 by Ant Ray, RN  Outcome: Progressing     Problem: Coping  Goal: Pt/Family able to verbalize concerns and  Consultation  Outcome: Progressing  Flowsheets (Taken 10/24/2024 9509 by Perri Pal RN)  Effect of psychiatric condition will be minimized and patient will be protected from self harm: Assess impact of patient’s symptoms on level of functioning, self care needs and offer support as indicated     Problem: Drug Abuse/Detox  Goal: Will have no detox symptoms and will verbalize plan for changing drug-related behavior  Description: INTERVENTIONS:  1. Administer medication as ordered  2. Monitor physical status  3. Provide emotional support with 1:1 interaction with staff  4. Encourage  recovery focused treatment   Outcome: Progressing     Problem: Neurosensory - Adult  Goal: Achieves stable or improved neurological status  Outcome: Progressing     Problem: Neurosensory - Adult  Goal: Achieves maximal functionality and self care  Outcome: Progressing

## 2024-10-24 NOTE — BH NOTE
Affect constricted, mood depressed/anxious. Alert and oriented X 4. Cooperative and pleasant. Attended and participated in group. Interacted with staff and peers. Makes needs known. Ate all meals and snacks. Denied SI/HI/AVH and pain. Medication compliant. Stable with no s/s of distress. Pt is coughing some and asking for robitussin cough syrup. Pt is anticipating discharge tomorrow.  He was pleased about it until he realized he did not have a belt in his belongings.      PRN Medication Documentation    Specific patient behavior that led to need for PRN medication: coughing  Staff interventions attempted prior to PRN being given: assessed patient.  PRN medication given: 0912 -Robitussin 10 ml. PO given for coughing.  Patient response/effectiveness of PRN medication: relieved cough.

## 2024-10-24 NOTE — GROUP NOTE
Group Therapy Note    Date: 10/24/2024      I was able to speak with pt before he left to take a shower.  He was pleasant and cooperative, engaging when prompted

## 2024-10-24 NOTE — BH NOTE
Patient had phone assessment with [facility], he was accepted and will be admitted tomorrow. Address provided and needs to arrive by 2pm. Scripts are to be sent with patient. Patient was notified. Transportation will be set up today for discharge.    [address]  [address]  [place] [address]  [phone]  Fax [phone]     for 10/25 10am phone [phone] trip# [phone]

## 2024-10-24 NOTE — PLAN OF CARE
Problem: Self Harm/Suicidality  Goal: Will have no self-injury during hospital stay  Description: INTERVENTIONS:  1.  Ensure constant observer at bedside with Q15M safety checks  2.  Maintain a safe environment  3.  Secure patient belongings  4.  Ensure family/visitors adhere to safety recommendations  5.  Ensure safety tray has been added to patient's diet order  6.  Every shift and PRN: Re-assess suicidal risk via Frequent Screener    Outcome: Progressing     Problem: Pain  Goal: Verbalizes/displays adequate comfort level or baseline comfort level  Outcome: Progressing     Problem: ABCDS Injury Assessment  Goal: Absence of physical injury  Outcome: Progressing     Problem: Chronic Conditions and Co-morbidities  Goal: Patient's chronic conditions and co-morbidity symptoms are monitored and maintained or improved  Recent Flowsheet Documentation  Taken 10/24/2024 0861 by Perri Pal, RN  Care Plan - Patient's Chronic Conditions and Co-Morbidity Symptoms are Monitored and Maintained or Improved: Monitor and assess patient's chronic conditions and comorbid symptoms for stability, deterioration, or improvement

## 2024-10-25 VITALS
SYSTOLIC BLOOD PRESSURE: 111 MMHG | WEIGHT: 231.6 LBS | DIASTOLIC BLOOD PRESSURE: 74 MMHG | BODY MASS INDEX: 29.72 KG/M2 | RESPIRATION RATE: 18 BRPM | HEIGHT: 74 IN | HEART RATE: 84 BPM | OXYGEN SATURATION: 97 % | TEMPERATURE: 98.6 F

## 2024-10-25 PROBLEM — G24.01 NEUROLEPTIC-INDUCED TARDIVE DYSKINESIA: Status: RESOLVED | Noted: 2024-10-10 | Resolved: 2024-10-25

## 2024-10-25 PROBLEM — T43.505A NEUROLEPTIC-INDUCED TARDIVE DYSKINESIA: Status: RESOLVED | Noted: 2024-10-10 | Resolved: 2024-10-25

## 2024-10-25 PROCEDURE — 6370000000 HC RX 637 (ALT 250 FOR IP): Performed by: NURSE PRACTITIONER

## 2024-10-25 PROCEDURE — 6370000000 HC RX 637 (ALT 250 FOR IP): Performed by: PSYCHIATRY & NEUROLOGY

## 2024-10-25 PROCEDURE — 99239 HOSP IP/OBS DSCHRG MGMT >30: CPT | Performed by: PSYCHIATRY & NEUROLOGY

## 2024-10-25 RX ORDER — NICOTINE 21 MG/24HR
1 PATCH, TRANSDERMAL 24 HOURS TRANSDERMAL DAILY
Qty: 30 PATCH | Refills: 0 | Status: SHIPPED | OUTPATIENT
Start: 2024-10-25

## 2024-10-25 RX ORDER — ESOMEPRAZOLE MAGNESIUM 40 MG/1
40 CAPSULE, DELAYED RELEASE ORAL 2 TIMES DAILY
Qty: 60 CAPSULE | Refills: 0 | Status: SHIPPED | OUTPATIENT
Start: 2024-10-25

## 2024-10-25 RX ORDER — QUETIAPINE FUMARATE 400 MG/1
800 TABLET, FILM COATED ORAL
Qty: 60 TABLET | Refills: 0 | Status: SHIPPED | OUTPATIENT
Start: 2024-10-25

## 2024-10-25 RX ORDER — HALOPERIDOL 5 MG/1
15 TABLET ORAL 2 TIMES DAILY
Qty: 180 TABLET | Refills: 0 | Status: SHIPPED | OUTPATIENT
Start: 2024-10-25

## 2024-10-25 RX ORDER — TOPIRAMATE 50 MG/1
50 TABLET, FILM COATED ORAL 3 TIMES DAILY
Qty: 90 TABLET | Refills: 0 | Status: SHIPPED | OUTPATIENT
Start: 2024-10-25

## 2024-10-25 RX ORDER — TRAZODONE HYDROCHLORIDE 100 MG/1
200 TABLET ORAL NIGHTLY
Qty: 60 TABLET | Refills: 0 | Status: SHIPPED | OUTPATIENT
Start: 2024-10-25

## 2024-10-25 RX ADMIN — PANTOPRAZOLE SODIUM 40 MG: 40 TABLET, DELAYED RELEASE ORAL at 06:25

## 2024-10-25 RX ADMIN — FLUOXETINE HYDROCHLORIDE 20 MG: 20 CAPSULE ORAL at 08:37

## 2024-10-25 RX ADMIN — CALCIUM CARBONATE 500 MG: 500 TABLET, CHEWABLE ORAL at 08:37

## 2024-10-25 RX ADMIN — TOPIRAMATE 50 MG: 25 TABLET, FILM COATED ORAL at 08:36

## 2024-10-25 RX ADMIN — HALOPERIDOL 15 MG: 5 TABLET ORAL at 08:36

## 2024-10-25 RX ADMIN — GUAIFENESIN 600 MG: 600 TABLET ORAL at 08:36

## 2024-10-25 ASSESSMENT — PAIN SCALES - GENERAL: PAINLEVEL_OUTOF10: 0

## 2024-10-25 ASSESSMENT — PAIN - FUNCTIONAL ASSESSMENT: PAIN_FUNCTIONAL_ASSESSMENT: ACTIVITIES ARE NOT PREVENTED

## 2024-10-25 NOTE — BH NOTE
Affect blunt, mood depressed/anxious. Patient a & o x 4.  Denies pain. No SI, HI, AVH. Patient tolerated medications well, cooperative. Patient is expected to be discharged 10/25/2024. Patient's dinner was held and given at 2130 per patient's request. Consumed 100% of meal. Patient understands he needs to sit up for at least one hour due to gerd symptoms.  Currently sleeping during the time of this note.    Slept 5h 30min

## 2024-10-25 NOTE — BH NOTE
Behavioral Health Transition Record to Provider    Patient Name: Benitez Harmon  YOB: 1970  Medical Record Number: [id number]  Date of Admission: 10/9/2024  Date of Discharge: 10/25/2024    Attending Provider: Kahlil Bedoya MD  Discharging Provider: Kahlil Bedoya MD  To contact this individual call [phone] and ask the  to page.  If unavailable, ask to be transferred to Behavioral Health Provider on call.  A Behavioral Health Provider will be available on call 24/7 and during holidays.    Primary Care Provider: Murphy Ventura MD    No Known Allergies    Reason for Admission: Per Dr. Bedoya H &P: The patient is a 54-year-old single male, who has a lengthy past psychiatric history of schizoaffective disorder, bipolar type.  He has a history of multiple hospitalizations over his lifetime, including several this year alone.  Each of those times has been due to exacerbation of psychosis, essentially worsening auditory hallucinations, which become very distressing to him.  Most of those have also followed relapse on crack cocaine, something that he struggled with for many years.     Most recently, he had been hospitalized for this second time in 2 months at [facility] in July of the of this year and then transition to the [facility] for substance abuse treatment for 2 months.  He was discharged on 10/03, and moved in with his father in [place], [place].  However, he apparently got into some type of argument with his father, and he ended up relapsing on crack cocaine again a couple days later.  He then felt extremely distressed both from coming down from the crack, but also having relapsed after so much time spent dealing with his substance abuse issues, and he took an overdose of Tylenol, roughly 15 pills.  His friend called 911 and he was brought to the Emergency Room on 10/07 and then admitted here 2 days later.    Admission Diagnosis: Schizoaffective  Psychiatric Advance Directive? No  If the patient does not have a surrogate or Medical Advance Directive AND Psychiatric Advance Directive, the patient was offered information on these advance directives Refused    Patient Instructions: Please continue all medications until otherwise directed by physician.      Tobacco Cessation Discharge Plan:   Is the patient a smoker and needs referral for smoking cessation? Refused  Patient referred to the following for smoking cessation with an appointment? Refused    Patient was offered medication to assist with smoking cessation at discharge? Refused  Was education for smoking cessation added to the discharge instructions? Yes    Alcohol/Substance Abuse Discharge Plan:   Does the patient have a history of substance/alcohol abuse and requires a referral for treatment? Yes  Patient referred to the following for substance/alcohol abuse treatment with an appointment? Yes  Patient was offered medication to assist with alcohol cessation at discharge? No  Was education for substance/alcohol abuse added to discharge instructions? Yes    [facility]. Discharge information discussed with patient/caregiver and provided to the patient/caregiver either in hard copy or electronically.

## 2024-10-25 NOTE — PLAN OF CARE
Problem: Discharge Planning  Goal: Discharge to home or other facility with appropriate resources  Outcome: Adequate for Discharge  Flowsheets (Taken 10/25/2024 0800)  Discharge to home or other facility with appropriate resources: Identify barriers to discharge with patient and caregiver     Problem: Self Harm/Suicidality  Goal: Will have no self-injury during hospital stay  Description: INTERVENTIONS:  1.  Ensure constant observer at bedside with Q15M safety checks  2.  Maintain a safe environment  3.  Secure patient belongings  4.  Ensure family/visitors adhere to safety recommendations  5.  Ensure safety tray has been added to patient's diet order  6.  Every shift and PRN: Re-assess suicidal risk via Frequent Screener    10/25/2024 1138 by Ann Jaramillo RN  Outcome: Adequate for Discharge  10/25/2024 1134 by Ann Jaramillo RN  Outcome: Progressing  Flowsheets (Taken 10/25/2024 0800)  Will have no self-injury during hospital stay: Maintain a safe environment     Problem: Pain  Goal: Verbalizes/displays adequate comfort level or baseline comfort level  Outcome: Adequate for Discharge  Flowsheets (Taken 10/25/2024 1126)  Verbalizes/displays adequate comfort level or baseline comfort level: Encourage patient to monitor pain and request assistance     Problem: ABCDS Injury Assessment  Goal: Absence of physical injury  Outcome: Adequate for Discharge     Problem: Chronic Conditions and Co-morbidities  Goal: Patient's chronic conditions and co-morbidity symptoms are monitored and maintained or improved  Outcome: Adequate for Discharge  Flowsheets (Taken 10/25/2024 0800)  Care Plan - Patient's Chronic Conditions and Co-Morbidity Symptoms are Monitored and Maintained or Improved: Monitor and assess patient's chronic conditions and comorbid symptoms for stability, deterioration, or improvement     Problem: Anxiety  Goal: Will report anxiety at manageable levels  Description: INTERVENTIONS:  1. Administer  offer support as indicated  2. Assess patient/family knowledge of depression, impact on illness and need for teaching  3. Provide emotional support, presence and reassurance  4. Assess for possible suicidal thoughts or ideation. If patient expresses suicidal thoughts or statements do not leave alone, initiate Suicide Precautions, move to a room close to the nursing station and obtain sitter  5. Initiate consults as appropriate with Mental Health Professional, Spiritual Care, Psychosocial CNS, and consider a recommendation to the LIP for a Psychiatric Consultation  Outcome: Adequate for Discharge  Flowsheets (Taken 10/25/2024 0800)  Effect of psychiatric condition will be minimized and patient will be protected from self harm: Assess impact of patient’s symptoms on level of functioning, self care needs and offer support as indicated     Problem: Drug Abuse/Detox  Goal: Will have no detox symptoms and will verbalize plan for changing drug-related behavior  Description: INTERVENTIONS:  1. Administer medication as ordered  2. Monitor physical status  3. Provide emotional support with 1:1 interaction with staff  4. Encourage  recovery focused treatment   Outcome: Adequate for Discharge     Problem: Neurosensory - Adult  Goal: Achieves stable or improved neurological status  Outcome: Adequate for Discharge  Flowsheets (Taken 10/25/2024 0800)  Achieves stable or improved neurological status: Assess for and report changes in neurological status  Goal: Achieves maximal functionality and self care  Outcome: Adequate for Discharge     Problem: Neurosensory - Adult  Goal: Achieves maximal functionality and self care  Outcome: Adequate for Discharge

## 2024-10-25 NOTE — DISCHARGE SUMMARY
[address]  [address]                         PSYCH DISCHARGE SUMMARY      PATIENT NAME: WOLF BRANDON            : 1970  MED REC NO: [id number]                       ROOM: 230  ACCOUNT NO: [id number]                       ADMIT DATE: 10/09/2024  PROVIDER: Kahlil Bedoya MD    DATE:  10/25/2024    Note:  Greater than 35 minutes was spent in the preparation of this discharge.    DISCHARGE DIAGNOSES:    Axis I:  1. Schizoaffective disorder, bipolar type (F25.0).  2. Cocaine dependence with recent relapse (F14.20).  Axis II:  Deferred.  Axis III:  Migraine headaches; severe GERD, history of multiple prior overdoses.  Axis IV:  Stressors are moderate (substance relapse, limited coping skills; limited support).  Axis V:  GAF at discharge is 65.    DISCHARGE MEDICATIONS:    1. Prozac 20 mg daily (new).  2. Seroquel 800 mg q.h.s.  3. Haldol 15 mg b.i.d.  4. Topamax 50 mg t.i.d.  5. Trazodone 200 mg q.h.s.  6. Nexium 40 mg b.i.d.  7. Nicoderm patch 14 mg daily.    He was given a 30-day prescription for each of these medicines with no refills on the prescriptions.    DISPOSITION/FOLLOWUP PLANS:  The patient is being discharged in stable condition later this morning on 10/25/2024.  He will be transitioning to the [facility] for inpatient substance abuse treatment in [facility] today, and their staff will be picking him up to drive him to treatment.  Once he completes that, he will follow up with his regular psychiatric provider in the [facility] where he lives with his father, and continue to receive outpatient substance abuse treatment ideally.    HOSPITAL COURSE:  As noted in the admission note, the patient is a 54-year-old single male, who has a lengthy past psychiatric history of schizoaffective disorder, bipolar type as well as significant crack cocaine dependence.  He was admitted voluntarily

## 2024-10-25 NOTE — PLAN OF CARE
Problem: Self Harm/Suicidality  Goal: Will have no self-injury during hospital stay  Description: INTERVENTIONS:  1.  Ensure constant observer at bedside with Q15M safety checks  2.  Maintain a safe environment  3.  Secure patient belongings  4.  Ensure family/visitors adhere to safety recommendations  5.  Ensure safety tray has been added to patient's diet order  6.  Every shift and PRN: Re-assess suicidal risk via Frequent Screener    Outcome: Progressing  Flowsheets (Taken 10/25/2024 0800)  Will have no self-injury during hospital stay: Maintain a safe environment     Problem: Pain  Goal: Verbalizes/displays adequate comfort level or baseline comfort level  Recent Flowsheet Documentation  Taken 10/25/2024 1126 by Ann Jaramillo, RN  Verbalizes/displays adequate comfort level or baseline comfort level: Encourage patient to monitor pain and request assistance     Problem: Chronic Conditions and Co-morbidities  Goal: Patient's chronic conditions and co-morbidity symptoms are monitored and maintained or improved  Recent Flowsheet Documentation  Taken 10/25/2024 0800 by Ann Jaramillo, RN  Care Plan - Patient's Chronic Conditions and Co-Morbidity Symptoms are Monitored and Maintained or Improved: Monitor and assess patient's chronic conditions and comorbid symptoms for stability, deterioration, or improvement

## 2024-10-25 NOTE — GROUP NOTE
Group Therapy Note    Date: 10/25/2024    Group Start Time: 09  Group End Time: 950  Group Topic: Process Group - Inpatient    [facility]    Ibeth Taylor LCSW        Group Therapy Note    Attendees: 5 scheduled    Focus of session was on “The Symptoms of Inner Peace” by Sid Cedeno.  I shared the idea with group and that it includes the ideas of how inner peace is a disease and here is what it looks like.  The symptoms included “a loss of ability to worry, a loss of interest in conflict, an unmistakable ability to enjoy each moment, and frequent, overwhelming episodes of appreciation.”  I asked group members to share which “symptom” they can work to get and how they can see it will help them have more peace in their day to day life.          Patient's Goal:  ***    Notes:  ***    Status After Intervention:  {Status After Intervention:368133736}    Participation Level: {Participation Level:257348825}    Participation Quality: {[facility] PARTICIPATION QUALITY:707002866}      Speech:  {ED  CD_SPEECH:13421}      Thought Process/Content: {Thought Process/Content:840429398}      Affective Functioning: {Affective Functionin}      Mood: {Mood:346875125}      Level of consciousness:  {Level of consciousness:923887889}      Response to Learning: {[facility] Responses to Learnin}      Endings: {[facility] Endings:20664}    Modes of Intervention: {[facility] Modes of Intervention:530655743}      Discipline Responsible: {[facility] Multidisciplinary:684261607}      Signature:  Ibeth Taylor LCSW

## 2024-10-25 NOTE — BH NOTE
Affect blunted; mood anxious.  Ate 90% of meals.  All belongings returned to patient.  Verbalized understanding of DC instructions.  Denies SI/HI/AVH/pain. Med compliant.  DC with Rosa M hanson to [facility] at 1026.Forgot folder with scripts so Rosa M tried to call ride to return but could not reach them.  Scripts faxed to treatment center.

## 2024-10-25 NOTE — DISCHARGE INSTRUCTIONS
BEHAVIORAL HEALTH NURSING DISCHARGE NOTE      The following personal items collected during your admission are returned to you:   Dental Appliance:    Vision:    Hearing Aid:    Jewelry:    Clothing:    Other Valuables:    Valuables sent to safe:        PATIENT INSTRUCTIONS:    What to do at Home:  Recommended diet: cardiac diet, avoid spicy foods and sit up 30 min after eating    Recommended activity: activity as tolerated,     Follow-up with  [facility] [phone]. If you have problems relating to your recovery, call your physician.    The discharge information has been reviewed with the patient.  The patient verbalized understanding.

## 2024-11-09 PROBLEM — J06.9 URI (UPPER RESPIRATORY INFECTION): Status: RESOLVED | Noted: 2024-10-10 | Resolved: 2024-11-09
